# Patient Record
Sex: FEMALE | URBAN - METROPOLITAN AREA
[De-identification: names, ages, dates, MRNs, and addresses within clinical notes are randomized per-mention and may not be internally consistent; named-entity substitution may affect disease eponyms.]

---

## 2024-08-01 PROBLEM — Z00.00 ENCOUNTER FOR PREVENTIVE HEALTH EXAMINATION: Status: ACTIVE | Noted: 2024-08-01

## 2024-10-18 ENCOUNTER — INPATIENT (INPATIENT)
Facility: HOSPITAL | Age: 54
LOS: 10 days | Discharge: HOME | DRG: 871 | End: 2024-10-29
Attending: HOSPITALIST | Admitting: STUDENT IN AN ORGANIZED HEALTH CARE EDUCATION/TRAINING PROGRAM
Payer: MEDICARE

## 2024-10-18 ENCOUNTER — RESULT REVIEW (OUTPATIENT)
Age: 54
End: 2024-10-18

## 2024-10-18 VITALS
OXYGEN SATURATION: 100 % | TEMPERATURE: 99 F | RESPIRATION RATE: 26 BRPM | SYSTOLIC BLOOD PRESSURE: 72 MMHG | DIASTOLIC BLOOD PRESSURE: 56 MMHG | HEART RATE: 115 BPM

## 2024-10-18 DIAGNOSIS — I69.391 DYSPHAGIA FOLLOWING CEREBRAL INFARCTION: ICD-10-CM

## 2024-10-18 DIAGNOSIS — A41.59 OTHER GRAM-NEGATIVE SEPSIS: ICD-10-CM

## 2024-10-18 DIAGNOSIS — G92.8 OTHER TOXIC ENCEPHALOPATHY: ICD-10-CM

## 2024-10-18 DIAGNOSIS — E78.00 PURE HYPERCHOLESTEROLEMIA, UNSPECIFIED: ICD-10-CM

## 2024-10-18 DIAGNOSIS — D68.61 ANTIPHOSPHOLIPID SYNDROME: ICD-10-CM

## 2024-10-18 DIAGNOSIS — G40.909 EPILEPSY, UNSPECIFIED, NOT INTRACTABLE, WITHOUT STATUS EPILEPTICUS: ICD-10-CM

## 2024-10-18 DIAGNOSIS — E83.52 HYPERCALCEMIA: ICD-10-CM
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
## 2024-10-18 DIAGNOSIS — Z79.01 LONG TERM (CURRENT) USE OF ANTICOAGULANTS: ICD-10-CM

## 2024-10-18 DIAGNOSIS — R65.21 SEVERE SEPSIS WITH SEPTIC SHOCK: ICD-10-CM

## 2024-10-18 DIAGNOSIS — N18.6 END STAGE RENAL DISEASE: ICD-10-CM

## 2024-10-18 DIAGNOSIS — D62 ACUTE POSTHEMORRHAGIC ANEMIA: ICD-10-CM
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
## 2024-10-18 DIAGNOSIS — J96.01 ACUTE RESPIRATORY FAILURE WITH HYPOXIA: ICD-10-CM

## 2024-10-18 DIAGNOSIS — T82.856A STENOSIS OF PERIPHERAL VASCULAR STENT, INITIAL ENCOUNTER: ICD-10-CM

## 2024-10-18 DIAGNOSIS — Z99.2 DEPENDENCE ON RENAL DIALYSIS: ICD-10-CM

## 2024-10-18 DIAGNOSIS — I69.320 APHASIA FOLLOWING CEREBRAL INFARCTION: ICD-10-CM

## 2024-10-18 DIAGNOSIS — L89.150 PRESSURE ULCER OF SACRAL REGION, UNSTAGEABLE: ICD-10-CM

## 2024-10-18 DIAGNOSIS — K92.2 GASTROINTESTINAL HEMORRHAGE, UNSPECIFIED: ICD-10-CM

## 2024-10-18 DIAGNOSIS — D53.9 NUTRITIONAL ANEMIA, UNSPECIFIED: ICD-10-CM

## 2024-10-18 DIAGNOSIS — G90.9 DISORDER OF THE AUTONOMIC NERVOUS SYSTEM, UNSPECIFIED: ICD-10-CM

## 2024-10-18 DIAGNOSIS — R09.89 OTHER SPECIFIED SYMPTOMS AND SIGNS INVOLVING THE CIRCULATORY AND RESPIRATORY SYSTEMS: ICD-10-CM

## 2024-10-18 DIAGNOSIS — I95.9 HYPOTENSION, UNSPECIFIED: ICD-10-CM

## 2024-10-18 DIAGNOSIS — R79.89 OTHER SPECIFIED ABNORMAL FINDINGS OF BLOOD CHEMISTRY: ICD-10-CM

## 2024-10-18 DIAGNOSIS — R57.1 HYPOVOLEMIC SHOCK: ICD-10-CM

## 2024-10-18 LAB
ALBUMIN SERPL ELPH-MCNC: 2.5 G/DL — LOW (ref 3.3–5)
ALP SERPL-CCNC: 118 U/L — SIGNIFICANT CHANGE UP (ref 40–120)
ALT FLD-CCNC: 42 U/L — SIGNIFICANT CHANGE UP (ref 12–78)
ANION GAP SERPL CALC-SCNC: 8 MMOL/L — SIGNIFICANT CHANGE UP (ref 5–17)
ANISOCYTOSIS BLD QL: SLIGHT — SIGNIFICANT CHANGE UP
APPEARANCE UR: CLEAR — SIGNIFICANT CHANGE UP
APTT BLD: 108.2 SEC — HIGH (ref 24.5–35.6)
APTT BLD: 36.2 SEC — HIGH (ref 24.5–35.6)
AST SERPL-CCNC: 57 U/L — HIGH (ref 15–37)
BACTERIA # UR AUTO: NEGATIVE /HPF — SIGNIFICANT CHANGE UP
BASE EXCESS BLDA CALC-SCNC: 5.1 MMOL/L — HIGH (ref -2–3)
BASOPHILS # BLD AUTO: 0.04 K/UL — SIGNIFICANT CHANGE UP (ref 0–0.2)
BASOPHILS NFR BLD AUTO: 0.4 % — SIGNIFICANT CHANGE UP (ref 0–2)
BILIRUB SERPL-MCNC: 0.6 MG/DL — SIGNIFICANT CHANGE UP (ref 0.2–1.2)
BILIRUB UR-MCNC: NEGATIVE — SIGNIFICANT CHANGE UP
BLOOD GAS COMMENTS ARTERIAL: SIGNIFICANT CHANGE UP
BUN SERPL-MCNC: 43 MG/DL — HIGH (ref 7–23)
CALCIUM SERPL-MCNC: 11.8 MG/DL — HIGH (ref 8.5–10.1)
CAST: 12 /LPF — HIGH (ref 0–4)
CHLORIDE SERPL-SCNC: 97 MMOL/L — SIGNIFICANT CHANGE UP (ref 96–108)
CO2 SERPL-SCNC: 30 MMOL/L — SIGNIFICANT CHANGE UP (ref 22–31)
COLOR SPEC: YELLOW — SIGNIFICANT CHANGE UP
CREAT SERPL-MCNC: 5.07 MG/DL — HIGH (ref 0.5–1.3)
DIFF PNL FLD: NEGATIVE — SIGNIFICANT CHANGE UP
EGFR: 9 ML/MIN/1.73M2 — LOW
EOSINOPHIL # BLD AUTO: 0 K/UL — SIGNIFICANT CHANGE UP (ref 0–0.5)
EOSINOPHIL NFR BLD AUTO: 0 % — SIGNIFICANT CHANGE UP (ref 0–6)
FLUAV AG NPH QL: SIGNIFICANT CHANGE UP
FLUBV AG NPH QL: SIGNIFICANT CHANGE UP
GLUCOSE SERPL-MCNC: 133 MG/DL — HIGH (ref 70–99)
GLUCOSE UR QL: NEGATIVE MG/DL — SIGNIFICANT CHANGE UP
HCO3 BLDA-SCNC: 30 MMOL/L — HIGH (ref 21–28)
HCT VFR BLD CALC: 27.9 % — LOW (ref 34.5–45)
HCT VFR BLD CALC: 31.3 % — LOW (ref 34.5–45)
HGB BLD-MCNC: 8.5 G/DL — LOW (ref 11.5–15.5)
HGB BLD-MCNC: 9.6 G/DL — LOW (ref 11.5–15.5)
IMM GRANULOCYTES NFR BLD AUTO: 0.5 % — SIGNIFICANT CHANGE UP (ref 0–0.9)
INR BLD: 1.43 RATIO — HIGH (ref 0.85–1.16)
KETONES UR-MCNC: NEGATIVE MG/DL — SIGNIFICANT CHANGE UP
LACTATE SERPL-SCNC: 1.6 MMOL/L — SIGNIFICANT CHANGE UP (ref 0.7–2)
LEUKOCYTE ESTERASE UR-ACNC: ABNORMAL
LYMPHOCYTES # BLD AUTO: 1.62 K/UL — SIGNIFICANT CHANGE UP (ref 1–3.3)
LYMPHOCYTES # BLD AUTO: 14.7 % — SIGNIFICANT CHANGE UP (ref 13–44)
MACROCYTES BLD QL: SLIGHT — SIGNIFICANT CHANGE UP
MANUAL SMEAR VERIFICATION: SIGNIFICANT CHANGE UP
MCHC RBC-ENTMCNC: 30.5 GM/DL — LOW (ref 32–36)
MCHC RBC-ENTMCNC: 30.7 GM/DL — LOW (ref 32–36)
MCHC RBC-ENTMCNC: 30.9 PG — SIGNIFICANT CHANGE UP (ref 27–34)
MCHC RBC-ENTMCNC: 31.1 PG — SIGNIFICANT CHANGE UP (ref 27–34)
MCV RBC AUTO: 100.6 FL — HIGH (ref 80–100)
MCV RBC AUTO: 102.2 FL — HIGH (ref 80–100)
MONOCYTES # BLD AUTO: 0.81 K/UL — SIGNIFICANT CHANGE UP (ref 0–0.9)
MONOCYTES NFR BLD AUTO: 7.3 % — SIGNIFICANT CHANGE UP (ref 2–14)
NEUTROPHILS # BLD AUTO: 8.51 K/UL — HIGH (ref 1.8–7.4)
NEUTROPHILS NFR BLD AUTO: 77.1 % — HIGH (ref 43–77)
NITRITE UR-MCNC: NEGATIVE — SIGNIFICANT CHANGE UP
PCO2 BLDA: 47 MMHG — HIGH (ref 32–45)
PH BLDA: 7.42 — SIGNIFICANT CHANGE UP (ref 7.35–7.45)
PH UR: >=9 (ref 5–8)
PLAT MORPH BLD: NORMAL — SIGNIFICANT CHANGE UP
PLATELET # BLD AUTO: 125 K/UL — LOW (ref 150–400)
PLATELET # BLD AUTO: 153 K/UL — SIGNIFICANT CHANGE UP (ref 150–400)
PO2 BLDA: 429 MMHG — HIGH (ref 83–108)
POTASSIUM SERPL-MCNC: 4.2 MMOL/L — SIGNIFICANT CHANGE UP (ref 3.5–5.3)
POTASSIUM SERPL-SCNC: 4.2 MMOL/L — SIGNIFICANT CHANGE UP (ref 3.5–5.3)
PROT SERPL-MCNC: 9 GM/DL — HIGH (ref 6–8.3)
PROT UR-MCNC: 300 MG/DL
PROTHROM AB SERPL-ACNC: 16.4 SEC — HIGH (ref 9.9–13.4)
RBC # BLD: 2.73 M/UL — LOW (ref 3.8–5.2)
RBC # BLD: 3.11 M/UL — LOW (ref 3.8–5.2)
RBC # FLD: 19.9 % — HIGH (ref 10.3–14.5)
RBC # FLD: 20.3 % — HIGH (ref 10.3–14.5)
RBC BLD AUTO: ABNORMAL
RBC CASTS # UR COMP ASSIST: 6 /HPF — HIGH (ref 0–4)
RSV RNA NPH QL NAA+NON-PROBE: SIGNIFICANT CHANGE UP
SAO2 % BLDA: 100 % — HIGH (ref 94–98)
SARS-COV-2 RNA SPEC QL NAA+PROBE: SIGNIFICANT CHANGE UP
SODIUM SERPL-SCNC: 135 MMOL/L — SIGNIFICANT CHANGE UP (ref 135–145)
SP GR SPEC: 1.01 — SIGNIFICANT CHANGE UP (ref 1–1.03)
SQUAMOUS # UR AUTO: 2 /HPF — SIGNIFICANT CHANGE UP (ref 0–5)
TROPONIN I, HIGH SENSITIVITY RESULT: 85.68 NG/L — HIGH
UROBILINOGEN FLD QL: 1 MG/DL — SIGNIFICANT CHANGE UP (ref 0.2–1)
WBC # BLD: 11.03 K/UL — HIGH (ref 3.8–10.5)
WBC # BLD: 9.57 K/UL — SIGNIFICANT CHANGE UP (ref 3.8–10.5)
WBC # FLD AUTO: 11.03 K/UL — HIGH (ref 3.8–10.5)
WBC # FLD AUTO: 9.57 K/UL — SIGNIFICANT CHANGE UP (ref 3.8–10.5)
WBC UR QL: 8 /HPF — HIGH (ref 0–5)

## 2024-10-18 PROCEDURE — 82962 GLUCOSE BLOOD TEST: CPT

## 2024-10-18 PROCEDURE — 87077 CULTURE AEROBIC IDENTIFY: CPT

## 2024-10-18 PROCEDURE — 85025 COMPLETE CBC W/AUTO DIFF WBC: CPT

## 2024-10-18 PROCEDURE — 83735 ASSAY OF MAGNESIUM: CPT

## 2024-10-18 PROCEDURE — C1751: CPT

## 2024-10-18 PROCEDURE — 74177 CT ABD & PELVIS W/CONTRAST: CPT | Mod: 26

## 2024-10-18 PROCEDURE — 86901 BLOOD TYPING SEROLOGIC RH(D): CPT

## 2024-10-18 PROCEDURE — 84484 ASSAY OF TROPONIN QUANT: CPT

## 2024-10-18 PROCEDURE — 93010 ELECTROCARDIOGRAM REPORT: CPT

## 2024-10-18 PROCEDURE — 84443 ASSAY THYROID STIM HORMONE: CPT

## 2024-10-18 PROCEDURE — 82652 VIT D 1 25-DIHYDROXY: CPT

## 2024-10-18 PROCEDURE — 87641 MR-STAPH DNA AMP PROBE: CPT

## 2024-10-18 PROCEDURE — P9047: CPT

## 2024-10-18 PROCEDURE — 82040 ASSAY OF SERUM ALBUMIN: CPT

## 2024-10-18 PROCEDURE — 36600 WITHDRAWAL OF ARTERIAL BLOOD: CPT

## 2024-10-18 PROCEDURE — 99291 CRITICAL CARE FIRST HOUR: CPT

## 2024-10-18 PROCEDURE — 87070 CULTURE OTHR SPECIMN AEROBIC: CPT

## 2024-10-18 PROCEDURE — 90935 HEMODIALYSIS ONE EVALUATION: CPT

## 2024-10-18 PROCEDURE — 71045 X-RAY EXAM CHEST 1 VIEW: CPT | Mod: 26

## 2024-10-18 PROCEDURE — P9040: CPT

## 2024-10-18 PROCEDURE — 93306 TTE W/DOPPLER COMPLETE: CPT | Mod: 26

## 2024-10-18 PROCEDURE — 82330 ASSAY OF CALCIUM: CPT

## 2024-10-18 PROCEDURE — 74177 CT ABD & PELVIS W/CONTRAST: CPT | Mod: MC

## 2024-10-18 PROCEDURE — 82272 OCCULT BLD FECES 1-3 TESTS: CPT

## 2024-10-18 PROCEDURE — 82310 ASSAY OF CALCIUM: CPT

## 2024-10-18 PROCEDURE — 85610 PROTHROMBIN TIME: CPT

## 2024-10-18 PROCEDURE — 85730 THROMBOPLASTIN TIME PARTIAL: CPT

## 2024-10-18 PROCEDURE — 71275 CT ANGIOGRAPHY CHEST: CPT | Mod: 26

## 2024-10-18 PROCEDURE — 71275 CT ANGIOGRAPHY CHEST: CPT | Mod: MC

## 2024-10-18 PROCEDURE — 87186 SC STD MICRODIL/AGAR DIL: CPT

## 2024-10-18 PROCEDURE — 80074 ACUTE HEPATITIS PANEL: CPT

## 2024-10-18 PROCEDURE — C9254: CPT

## 2024-10-18 PROCEDURE — 80053 COMPREHEN METABOLIC PANEL: CPT

## 2024-10-18 PROCEDURE — 82306 VITAMIN D 25 HYDROXY: CPT

## 2024-10-18 PROCEDURE — 36415 COLL VENOUS BLD VENIPUNCTURE: CPT

## 2024-10-18 PROCEDURE — 85027 COMPLETE CBC AUTOMATED: CPT

## 2024-10-18 PROCEDURE — 80202 ASSAY OF VANCOMYCIN: CPT

## 2024-10-18 PROCEDURE — 99223 1ST HOSP IP/OBS HIGH 75: CPT

## 2024-10-18 PROCEDURE — 84100 ASSAY OF PHOSPHORUS: CPT

## 2024-10-18 PROCEDURE — 82803 BLOOD GASES ANY COMBINATION: CPT

## 2024-10-18 PROCEDURE — 86920 COMPATIBILITY TEST SPIN: CPT

## 2024-10-18 PROCEDURE — 36430 TRANSFUSION BLD/BLD COMPNT: CPT

## 2024-10-18 PROCEDURE — 80048 BASIC METABOLIC PNL TOTAL CA: CPT

## 2024-10-18 PROCEDURE — 71250 CT THORAX DX C-: CPT | Mod: 26,59,MC

## 2024-10-18 PROCEDURE — 86900 BLOOD TYPING SEROLOGIC ABO: CPT

## 2024-10-18 PROCEDURE — 93931 UPPER EXTREMITY STUDY: CPT | Mod: 26,RT

## 2024-10-18 PROCEDURE — 70450 CT HEAD/BRAIN W/O DYE: CPT | Mod: 26,MC

## 2024-10-18 PROCEDURE — 83970 ASSAY OF PARATHORMONE: CPT

## 2024-10-18 PROCEDURE — 87040 BLOOD CULTURE FOR BACTERIA: CPT

## 2024-10-18 PROCEDURE — 76376 3D RENDER W/INTRP POSTPROCES: CPT | Mod: 26

## 2024-10-18 PROCEDURE — 92610 EVALUATE SWALLOWING FUNCTION: CPT | Mod: GN

## 2024-10-18 PROCEDURE — 87640 STAPH A DNA AMP PROBE: CPT

## 2024-10-18 PROCEDURE — 86850 RBC ANTIBODY SCREEN: CPT

## 2024-10-18 PROCEDURE — 93931 UPPER EXTREMITY STUDY: CPT | Mod: RT

## 2024-10-18 RX ORDER — ESCITALOPRAM 10 MG/1
1 TABLET, FILM COATED ORAL
Refills: 0 | DISCHARGE

## 2024-10-18 RX ORDER — ALPRAZOLAM 0.25 MG
1 TABLET ORAL
Refills: 0 | DISCHARGE

## 2024-10-18 RX ORDER — CEFEPIME 2 G/1
1000 INJECTION, POWDER, FOR SOLUTION INTRAVENOUS EVERY 24 HOURS
Refills: 0 | Status: DISCONTINUED | OUTPATIENT
Start: 2024-10-18 | End: 2024-10-18

## 2024-10-18 RX ORDER — TRAZODONE HYDROCHLORIDE 100 MG/1
1 TABLET ORAL
Refills: 0 | DISCHARGE

## 2024-10-18 RX ORDER — COLLAGENASE CLOSTRIDIUM HIST. 250 UNIT/G
1 OINTMENT (GRAM) TOPICAL
Refills: 0 | DISCHARGE

## 2024-10-18 RX ORDER — WARFARIN SODIUM 4 MG
1 TABLET ORAL
Refills: 0 | DISCHARGE

## 2024-10-18 RX ORDER — SODIUM CHLORIDE 9 MG/ML
250 INJECTION, SOLUTION INTRAMUSCULAR; INTRAVENOUS; SUBCUTANEOUS ONCE
Refills: 0 | Status: COMPLETED | OUTPATIENT
Start: 2024-10-18 | End: 2024-10-18

## 2024-10-18 RX ORDER — LACOSAMIDE 100 MG/1
1 TABLET ORAL
Refills: 0 | DISCHARGE

## 2024-10-18 RX ORDER — SODIUM CHLORIDE 9 MG/ML
500 INJECTION, SOLUTION INTRAMUSCULAR; INTRAVENOUS; SUBCUTANEOUS ONCE
Refills: 0 | Status: COMPLETED | OUTPATIENT
Start: 2024-10-18 | End: 2024-10-18

## 2024-10-18 RX ORDER — SODIUM CHLORIDE 9 MG/ML
1000 INJECTION, SOLUTION INTRAMUSCULAR; INTRAVENOUS; SUBCUTANEOUS ONCE
Refills: 0 | Status: COMPLETED | OUTPATIENT
Start: 2024-10-18 | End: 2024-10-18

## 2024-10-18 RX ORDER — ACETAMINOPHEN 500 MG
2 TABLET ORAL
Refills: 0 | DISCHARGE

## 2024-10-18 RX ORDER — MICONAZOLE NITRATE 20 MG/G
1 CREAM TOPICAL
Refills: 0 | DISCHARGE

## 2024-10-18 RX ORDER — CEFEPIME 2 G/1
1000 INJECTION, POWDER, FOR SOLUTION INTRAVENOUS ONCE
Refills: 0 | Status: COMPLETED | OUTPATIENT
Start: 2024-10-18 | End: 2024-10-18

## 2024-10-18 RX ORDER — HEPARIN SODIUM 10000 [USP'U]/ML
5500 INJECTION INTRAVENOUS; SUBCUTANEOUS ONCE
Refills: 0 | Status: COMPLETED | OUTPATIENT
Start: 2024-10-18 | End: 2024-10-18

## 2024-10-18 RX ORDER — HEPARIN SODIUM 10000 [USP'U]/ML
2500 INJECTION INTRAVENOUS; SUBCUTANEOUS EVERY 6 HOURS
Refills: 0 | Status: DISCONTINUED | OUTPATIENT
Start: 2024-10-18 | End: 2024-10-20

## 2024-10-18 RX ORDER — POLYETHYLENE GLYCOL 3350 17 G/17G
17 POWDER, FOR SOLUTION ORAL
Refills: 0 | DISCHARGE

## 2024-10-18 RX ORDER — CEFEPIME 2 G/1
1000 INJECTION, POWDER, FOR SOLUTION INTRAVENOUS ONCE
Refills: 0 | Status: DISCONTINUED | OUTPATIENT
Start: 2024-10-18 | End: 2024-10-18

## 2024-10-18 RX ORDER — CHLORHEXIDINE GLUCONATE 40 MG/ML
1 SOLUTION TOPICAL
Refills: 0 | Status: DISCONTINUED | OUTPATIENT
Start: 2024-10-18 | End: 2024-10-21

## 2024-10-18 RX ORDER — VANCOMYCIN HYDROCHLORIDE 50 MG/ML
1000 KIT ORAL ONCE
Refills: 0 | Status: COMPLETED | OUTPATIENT
Start: 2024-10-18 | End: 2024-10-18

## 2024-10-18 RX ORDER — HYDROXYCHLOROQUINE SULFATE 200 MG
2 TABLET ORAL
Refills: 0 | DISCHARGE

## 2024-10-18 RX ORDER — SENNA 187 MG
1 TABLET ORAL
Refills: 0 | DISCHARGE

## 2024-10-18 RX ORDER — METOPROLOL TARTRATE 50 MG
1 TABLET ORAL
Refills: 0 | DISCHARGE

## 2024-10-18 RX ORDER — NOREPINEPHRINE BITARTRATE 1 MG/ML
0.05 INJECTION, SOLUTION, CONCENTRATE INTRAVENOUS
Qty: 8 | Refills: 0 | Status: DISCONTINUED | OUTPATIENT
Start: 2024-10-18 | End: 2024-10-21

## 2024-10-18 RX ORDER — CEFEPIME 2 G/1
1000 INJECTION, POWDER, FOR SOLUTION INTRAVENOUS EVERY 24 HOURS
Refills: 0 | Status: DISCONTINUED | OUTPATIENT
Start: 2024-10-18 | End: 2024-10-21

## 2024-10-18 RX ORDER — PANTOPRAZOLE SODIUM 40 MG/1
40 TABLET, DELAYED RELEASE ORAL DAILY
Refills: 0 | Status: DISCONTINUED | OUTPATIENT
Start: 2024-10-18 | End: 2024-10-20

## 2024-10-18 RX ORDER — TRAMADOL HYDROCHLORIDE 50 MG/1
1 TABLET, COATED ORAL
Refills: 0 | DISCHARGE

## 2024-10-18 RX ORDER — BUSPIRONE HYDROCHLORIDE 15 MG/1
1 TABLET ORAL
Refills: 0 | DISCHARGE

## 2024-10-18 RX ORDER — HEPARIN SODIUM 10000 [USP'U]/ML
5500 INJECTION INTRAVENOUS; SUBCUTANEOUS EVERY 6 HOURS
Refills: 0 | Status: DISCONTINUED | OUTPATIENT
Start: 2024-10-18 | End: 2024-10-20

## 2024-10-18 RX ORDER — LACOSAMIDE 100 MG/1
150 TABLET ORAL EVERY 12 HOURS
Refills: 0 | Status: DISCONTINUED | OUTPATIENT
Start: 2024-10-18 | End: 2024-10-20

## 2024-10-18 RX ORDER — HEPARIN SODIUM 10000 [USP'U]/ML
INJECTION INTRAVENOUS; SUBCUTANEOUS
Qty: 25000 | Refills: 0 | Status: DISCONTINUED | OUTPATIENT
Start: 2024-10-18 | End: 2024-10-20

## 2024-10-18 RX ADMIN — HEPARIN SODIUM 1200 UNIT(S)/HR: 10000 INJECTION INTRAVENOUS; SUBCUTANEOUS at 16:04

## 2024-10-18 RX ADMIN — CEFEPIME 1000 MILLIGRAM(S): 2 INJECTION, POWDER, FOR SOLUTION INTRAVENOUS at 09:03

## 2024-10-18 RX ADMIN — VANCOMYCIN HYDROCHLORIDE 250 MILLIGRAM(S): KIT at 10:25

## 2024-10-18 RX ADMIN — NOREPINEPHRINE BITARTRATE 6.33 MICROGRAM(S)/KG/MIN: 1 INJECTION, SOLUTION, CONCENTRATE INTRAVENOUS at 11:02

## 2024-10-18 RX ADMIN — LACOSAMIDE 100 MILLIGRAM(S): 100 TABLET ORAL at 16:47

## 2024-10-18 RX ADMIN — CHLORHEXIDINE GLUCONATE 1 APPLICATION(S): 40 SOLUTION TOPICAL at 23:39

## 2024-10-18 RX ADMIN — SODIUM CHLORIDE 250 MILLILITER(S): 9 INJECTION, SOLUTION INTRAMUSCULAR; INTRAVENOUS; SUBCUTANEOUS at 09:05

## 2024-10-18 RX ADMIN — HEPARIN SODIUM 1100 UNIT(S)/HR: 10000 INJECTION INTRAVENOUS; SUBCUTANEOUS at 23:38

## 2024-10-18 RX ADMIN — PANTOPRAZOLE SODIUM 40 MILLIGRAM(S): 40 TABLET, DELAYED RELEASE ORAL at 23:39

## 2024-10-18 RX ADMIN — HEPARIN SODIUM 5500 UNIT(S): 10000 INJECTION INTRAVENOUS; SUBCUTANEOUS at 16:06

## 2024-10-18 RX ADMIN — SODIUM CHLORIDE 500 MILLILITER(S): 9 INJECTION, SOLUTION INTRAMUSCULAR; INTRAVENOUS; SUBCUTANEOUS at 10:10

## 2024-10-18 RX ADMIN — SODIUM CHLORIDE 500 MILLILITER(S): 9 INJECTION, SOLUTION INTRAMUSCULAR; INTRAVENOUS; SUBCUTANEOUS at 09:05

## 2024-10-18 RX ADMIN — HEPARIN SODIUM 1200 UNIT(S)/HR: 10000 INJECTION INTRAVENOUS; SUBCUTANEOUS at 19:45

## 2024-10-18 RX ADMIN — SODIUM CHLORIDE 1000 MILLILITER(S): 9 INJECTION, SOLUTION INTRAMUSCULAR; INTRAVENOUS; SUBCUTANEOUS at 10:50

## 2024-10-18 NOTE — ED PROVIDER NOTE - PROGRESS NOTE DETAILS
ED Attending Dr. Waller, Pt difficult IV access.  Left mid line not working well.  Plan IV team to help with access.

## 2024-10-18 NOTE — ED PROVIDER NOTE - WET READ LAUNCH FT
0 = swallows foods/liquids without difficulty There are no Wet Read(s) to document. There is 1 Wet Read(s) to document.

## 2024-10-18 NOTE — H&P ADULT - NSHPLABSRESULTS_GEN_ALL_CORE
LABS:    CBC Full  -  ( 18 Oct 2024 07:32 )  WBC Count : 11.03 K/uL  RBC Count : 3.11 M/uL  Hemoglobin : 9.6 g/dL  Hematocrit : 31.3 %  Platelet Count - Automated : 153 K/uL  Mean Cell Volume : 100.6 fl  Mean Cell Hemoglobin : 30.9 pg  Mean Cell Hemoglobin Concentration : 30.7 gm/dL  Auto Neutrophil # : 8.51 K/uL  Auto Lymphocyte # : 1.62 K/uL  Auto Monocyte # : 0.81 K/uL  Auto Eosinophil # : 0.00 K/uL  Auto Basophil # : 0.04 K/uL  Auto Neutrophil % : 77.1 %  Auto Lymphocyte % : 14.7 %  Auto Monocyte % : 7.3 %  Auto Eosinophil % : 0.0 %  Auto Basophil % : 0.4 %    10-18    135  |  97  |  43[H]  ----------------------------<  133[H]  4.2   |  30  |  5.07[H]    Ca    11.8[H]      18 Oct 2024 07:32    TPro  9.0[H]  /  Alb  2.5[L]  /  TBili  0.6  /  DBili  x   /  AST  57[H]  /  ALT  42  /  AlkPhos  118  10-18      Urinalysis Basic - ( 18 Oct 2024 07:32 )    Color: x / Appearance: x / SG: x / pH: x  Gluc: 133 mg/dL / Ketone: x  / Bili: x / Urobili: x   Blood: x / Protein: x / Nitrite: x   Leuk Esterase: x / RBC: x / WBC x   Sq Epi: x / Non Sq Epi: x / Bacteria: x      CAPILLARY BLOOD GLUCOSE      POCT Blood Glucose.: 117 mg/dL (18 Oct 2024 07:24)        LIVER FUNCTIONS - ( 18 Oct 2024 07:32 )  Alb: 2.5 g/dL / Pro: 9.0 gm/dL / ALK PHOS: 118 U/L / ALT: 42 U/L / AST: 57 U/L / GGT: x

## 2024-10-18 NOTE — PATIENT PROFILE ADULT - FALL HARM RISK - HARM RISK INTERVENTIONS

## 2024-10-18 NOTE — ED ADULT NURSE NOTE - NSFALLHARMRISKINTERV_ED_ALL_ED
Assistance OOB with selected safe patient handling equipment if applicable/Assistance with ambulation/Communicate risk of Fall with Harm to all staff, patient, and family/Monitor gait and stability/Monitor for mental status changes and reorient to person, place, and time, as needed/Provide visual cue: red socks, yellow wristband, yellow gown, etc/Reinforce activity limits and safety measures with patient and family/Toileting schedule using arm’s reach rule for commode and bathroom/Use of alarms - bed, stretcher, chair and/or video monitoring/Bed in lowest position, wheels locked, appropriate side rails in place/Call bell, personal items and telephone in reach/Instruct patient to call for assistance before getting out of bed/chair/stretcher/Non-slip footwear applied when patient is off stretcher/[place] to call system/Physically safe environment - no spills, clutter or unnecessary equipment/Purposeful Proactive Rounding/Room/bathroom lighting operational, light cord in reach

## 2024-10-18 NOTE — ED PROVIDER NOTE - OBJECTIVE STATEMENT
54-year-old patient with past medical history for antiphospholipid syndrome, end-stage renal disease on dialysis Monday Wednesday Friday, acute embolism and thrombosis of the right internal jugular vein, high blood pressure, stroke, endocarditis, high cholesterol, presents emergency department for hypoxia, hypotension, less responsiveness.  Patient is awake,  soft  smoking and  answers few questions.  Patient currently with no pain  .  [facility] called and informed that patient was found this morning satting 60%, blood pressure 87/55 and low temperature of 97.2.  Patient was sent to the ER for further evaluation.  Patient is a DNR, but   Not DNI

## 2024-10-18 NOTE — PHARMACOTHERAPY INTERVENTION NOTE - COMMENTS
Med history complete, reviewed medications and allergies with patients med list from [facility] and confirmed medication list with doctor first med profile

## 2024-10-18 NOTE — ED ADULT NURSE NOTE - BREATHING INTERVENTIONS
non-rebreather/Oxygen Cosentyx Counseling:  I discussed with the patient the risks of Cosentyx including but not limited to worsening of Crohn's disease, immunosuppression, allergic reactions and infections.  The patient understands that monitoring is required including a PPD at baseline and must alert us or the primary physician if symptoms of infection or other concerning signs are noted.

## 2024-10-18 NOTE — ED ADULT TRIAGE NOTE - CHIEF COMPLAINT QUOTE
Pt BIBEMS from [facility] c/o hypoxia, hypotension, AMS and increasing lethargy beginning yesterday. PMhx CVA and ERSD. Upon arrival to ED,  pt taken directly to trauma room, MD Barryu at bedside. Pt BIBEMS from [place] c/o hypoxia, hypotension, AMS and increasing lethargy beginning yesterday. PICC line and fistula present upon arrival.  PMhx CVA and ERSD. Upon arrival to ED,  pt taken directly to trauma room, MD Barryu at bedside.

## 2024-10-18 NOTE — ED PROVIDER NOTE - PHYSICAL EXAMINATION
Returned call to pt, appt scheduled for 9/30/24 with a wait list option .   Constitutional: NAD, answer few questions  Eyes: EOMI, pupils equal  Head: Normocephalic atraumatic, dry MM  Mouth: no airway obstruction  Cardiac: regular rate , av fistula right arm  Resp: crackles lung base bilateral  GI: Abd s/nt/nd  Skin: + decub ulcer

## 2024-10-18 NOTE — PROGRESS NOTE ADULT - SUBJECTIVE AND OBJECTIVE BOX
OVERNIGHT EVENTS / SUBJECTIVE: Patient seen and examined at bedside.     Briefly, this is a 55 y/o F with PMH antiphospholipid syndrome on coumadin, ?lupus (on plaquenil), Sz d/o? 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 Pt found to be hypotensive in the ED despite 2.5L IVF thus started on pressors and admitted to CCU for further evaluation and management.    On arrival to CCU pt lethargic, protecting her airway, difficult to obtain pulse ox reading but ABG showing PO2 400 on 100$ NRB thus deescalated to NC. Pt is able to say her name but not responding further. On levo 0.09.    Labwork significant for mild leukocytosis, hgb 9.6, INR 1.43, BUN/Cr 43/5.07. UA w/ trace leuk esterase, few WBC.    CTH w/ chronic infarcts, encephalomalacia/gliosis, no acute findings.    CT C/A/P w/ no PE, possible R subclavian clot, enlarged fibroid uterus.      OBJECTIVE:    VITAL SIGNS:  ICU Vital Signs Last 24 Hrs  T(C): 36.2 (18 Oct 2024 12:39), Max: 37.4 (18 Oct 2024 07:22)  T(F): 97.2 (18 Oct 2024 12:39), Max: 99.3 (18 Oct 2024 07:22)  HR: 98 (18 Oct 2024 16:00) (92 - 115)  BP: 86/67 (18 Oct 2024 16:00) (35/26 - 112/31)  BP(mean): 75 (18 Oct 2024 16:00) (29 - 83)  ABP: --  ABP(mean): --  RR: 21 (18 Oct 2024 16:00) (10 - 27)  SpO2: 100% (18 Oct 2024 12:13) (80% - 100%)    O2 Parameters below as of 18 Oct 2024 13:00  Patient On (Oxygen Delivery Method): mask, nonrebreather              CAPILLARY BLOOD GLUCOSE      POCT Blood Glucose.: 117 mg/dL (18 Oct 2024 07:24)      PHYSICAL EXAM:    General: Lethargic, in NAD  HEENT: NC/AT; clear conjunctiva  Neck: supple  Respiratory: CTA b/l  Cardiovascular: +S1/S2; RRR  Abdomen: soft, nondistended, nontender  Extremities: Warm, no LE edema  Skin: No rash  Neurological: Lethargic, says name, not following commands or answering other questions, responds to pain    MEDICATIONS:  MEDICATIONS  (STANDING):  cefepime  Injectable. 1000 milliGRAM(s) IV Push every 24 hours  chlorhexidine 2% Cloths 1 Application(s) Topical <User Schedule>  heparin  Infusion.  Unit(s)/Hr (12 mL/Hr) IV Continuous <Continuous>  lacosamide IVPB 150 milliGRAM(s) IV Intermittent every 12 hours  norepinephrine Infusion 0.05 MICROgram(s)/kG/Min (6.33 mL/Hr) IV Continuous <Continuous>  pantoprazole  Injectable 40 milliGRAM(s) IV Push daily    MEDICATIONS  (PRN):  heparin   Injectable 5500 Unit(s) IV Push every 6 hours PRN For aPTT less than 40  heparin   Injectable 2500 Unit(s) IV Push every 6 hours PRN For aPTT between 40 - 57      ALLERGIES:  Allergies    No Known Allergies    Intolerances        LABS:                        9.6    11.03 )-----------( 153      ( 18 Oct 2024 07:32 )             31.3     Hemoglobin: 9.6 g/dL (10-18 @ 07:32)    CBC Full  -  ( 18 Oct 2024 07:32 )  WBC Count : 11.03 K/uL  RBC Count : 3.11 M/uL  Hemoglobin : 9.6 g/dL  Hematocrit : 31.3 %  Platelet Count - Automated : 153 K/uL  Mean Cell Volume : 100.6 fl  Mean Cell Hemoglobin : 30.9 pg  Mean Cell Hemoglobin Concentration : 30.7 gm/dL  Auto Neutrophil # : 8.51 K/uL  Auto Lymphocyte # : 1.62 K/uL  Auto Monocyte # : 0.81 K/uL  Auto Eosinophil # : 0.00 K/uL  Auto Basophil # : 0.04 K/uL  Auto Neutrophil % : 77.1 %  Auto Lymphocyte % : 14.7 %  Auto Monocyte % : 7.3 %  Auto Eosinophil % : 0.0 %  Auto Basophil % : 0.4 %    10-18    135  |  97  |  43[H]  ----------------------------<  133[H]  4.2   |  30  |  5.07[H]    Ca    11.8[H]      18 Oct 2024 07:32    TPro  9.0[H]  /  Alb  2.5[L]  /  TBili  0.6  /  DBili  x   /  AST  57[H]  /  ALT  42  /  AlkPhos  118  10-18    Creatinine Trend: 5.07<--  LIVER FUNCTIONS - ( 18 Oct 2024 07:32 )  Alb: 2.5 g/dL / Pro: 9.0 gm/dL / ALK PHOS: 118 U/L / ALT: 42 U/L / AST: 57 U/L / GGT: x           PT/INR - ( 18 Oct 2024 13:04 )   PT: 16.4 sec;   INR: 1.43 ratio         PTT - ( 18 Oct 2024 13:04 )  PTT:36.2 sec    hs Troponin:    ABG - ( 18 Oct 2024 13:59 )  pH, Arterial: 7.42  pH, Blood: x     /  pCO2: 47    /  pO2: 429   / HCO3: 30    / Base Excess: 5.1   /  SaO2: 100                 13:59 - ABG - pH: 7.42  |  pCO2: 47    |  pO2: 429   | Lactate:       | BE: 5.1        Urinalysis Basic - ( 18 Oct 2024 07:32 )    Color: x / Appearance: x / SG: x / pH: x  Gluc: 133 mg/dL / Ketone: x  / Bili: x / Urobili: x   Blood: x / Protein: x / Nitrite: x   Leuk Esterase: x / RBC: x / WBC x   Sq Epi: x / Non Sq Epi: x / Bacteria: x      CSF:                      EKG:   MICROBIOLOGY:    Urinalysis with Rflx Culture (collected 18 Oct 2024 07:30)      IMAGING:      Labs, imaging, EKG personally reviewed    RADIOLOGY & ADDITIONAL TESTS: Reviewed.

## 2024-10-18 NOTE — H&P ADULT - HISTORY OF PRESENT ILLNESS
ICU admit note    S:    Pt seen and examined  54-year-old patient with past medical history for antiphospholipid syndrome, end-stage renal disease on dialysis Monday Wednesday Friday, acute embolism and thrombosis of the right internal jugular vein, high blood pressure, stroke, endocarditis, high cholesterol, presents emergency department for hypoxia, hypotension, less responsiveness.  Patient is awake,  soft  smoking and  answers few questions.  Patient currently with no pain  .  [facility] called and informed that patient was found this morning satting 60%, blood pressure 87/55 and low temperature of 97.2.  Patient was sent to the ER for further evaluation.  Patient is a DNR, but   Not DNI    ICU called for consult    10/18: Hypotensive despite 1L IVF; add'l ordered, ESRD noted. Pressors ordered. Workup in progress.

## 2024-10-18 NOTE — ED ADULT NURSE NOTE - NSFALLASSESSNEED_ED_ALL_ED

## 2024-10-18 NOTE — ED ADULT TRIAGE NOTE - AVIAN FLU SYMPTOMS
- replete prn with IV and oral potassium  - magnesium checked  - q8h BMP    
Combined metabolic acidosis with high anion gap (secondary to ethanol) with respiratory acidosis. VBG checked, with pH of 7.24 and pCO2 of 48.  Patient is not hypoxic, breathing comfortably, GCS 14.     - continue to monitor, expect this was secondary to sedation which is slowly resolving    
Hgb 9.6 on admission, no baseline labs    - ordered iron studies, folate, B12    
Patient uses an inhaler twice daily, but is unsure which inhaler    - will need to check which inhaler she uses in the morning (parents should be arriving in the morning)    
Unsure of how much she drank last night. Ethanol level 216 at 2am.     - IVF given  - supportive care  - prn nausea medication    
No

## 2024-10-18 NOTE — ED ADULT NURSE NOTE - CHIEF COMPLAINT QUOTE
Pt BIBEMS from [place] c/o hypoxia, hypotension, AMS and increasing lethargy beginning yesterday. PICC line and fistula present upon arrival.  PMhx CVA and ERSD. Upon arrival to ED,  pt taken directly to trauma room, MD Barryu at bedside.

## 2024-10-18 NOTE — ED ADULT NURSE NOTE - OBJECTIVE STATEMENT
Patient is a 54y old female, alert and oriented X1, presenting to the ER with c/o hypotension. As per the triage note, "Pt BIBEMS from [place] c/o hypoxia, hypotension, AMS and increasing lethargy beginning yesterday."  Patient has midline noted to the left upper arm. Dressing changed upon arrival. MD Claire advised that Midline can be used.   Patient has an AV Fistula noted to the right upper arm. Pink band placed on right wrist.   Patient changed, sacral wound noted L: 2 1/2 X W: 1 1/2 X D: 1. Wound currently draining sanguineous fluid. Dressing applied.  Suspected Deep Tissue injuries noted to bilateral heels, non blanchable. Dressings applied.    Wound noted to the left upper thigh measuring 1 1/2  X 1 1/2. Dressing applied.  EKG completed and cardiac monitor in place.   Patient straight Cath for urine.

## 2024-10-18 NOTE — CONSULT NOTE ADULT - SUBJECTIVE AND OBJECTIVE BOX
NEPHROLOGY INTERVAL HPI/OVERNIGHT EVENTS:  JAYDON [id number]  HPI:    S:  Pt seen and examined  54-year-old patient with past medical history for antiphospholipid syndrome, end-stage renal disease on dialysis Monday Wednesday Friday, acute embolism and thrombosis of the right internal jugular vein, high blood pressure, stroke, endocarditis, high cholesterol, presents emergency department for hypoxia, hypotension, less responsiveness.  Patient is awake,  soft  smoking and  answers few questions.  Patient currently with no pain  .  [facility] called and informed that patient was found this morning satting 60%, blood pressure 87/55 and low temperature of 97.2.  Patient was sent to the ER for further evaluation.  Patient is a DNR, but   Not DNI  ICU called for consult  10/18: Hypotensive despite 1L IVF; add'l ordered, ESRD noted. Pressors ordered. Workup in progress.  (18 Oct 2024 11:35)  Patient is a 54y old  Female who presents with a chief complaint of Shock (18 Oct 2024 11:35)  ==============================================================================  NEPHROLOGY CONSULT  hx from chart and reivew of transfer document   esrd at [facility] on daily hd m-f   on MRSA endocard tx on vanc 500 mg end date 10/18 via left PICC line   admitted for shock with hypoxia   s/p CTA and a/p await results  on levofed   abg pending, sa02 not recordable   lethargic   has hx of APL on coumadin, with SLE on plaquenil   AVF left arm with + thrill and bruit  CCM input noted, unable to identify HCP       PAST MEDICAL & SURGICAL HISTORY:  - esrd mtwtf [facility] home dialysis   - MRSA endocarditis on vanc until 10/18   - APL on coumadin   - CVA with dysphagia   ESRD on dialysis          MEDICATIONS  (STANDING):  cefepime  Injectable. 1000 milliGRAM(s) IV Push every 24 hours  chlorhexidine 2% Cloths 1 Application(s) Topical <User Schedule>  lacosamide IVPB 150 milliGRAM(s) IV Intermittent every 12 hours  norepinephrine Infusion 0.05 MICROgram(s)/kG/Min (6.33 mL/Hr) IV Continuous <Continuous>    MEDICATIONS  (PRN):      Allergies    No Known Allergies    Intolerances        I&O's Summary      Home Medications:  busPIRone 10 mg oral tablet: 1 tab(s) orally 2 times a day (18 Oct 2024 11:16)  lacosamide 150 mg oral tablet: 1 tab(s) orally 2 times a day (18 Oct 2024 11:16)  Lexapro 20 mg oral tablet: 1 tab(s) orally once a day (18 Oct 2024 11:16)  Lipitor 40 mg oral tablet: 1 tab(s) orally once a day (at bedtime) (18 Oct 2024 11:16)  metoprolol succinate 25 mg oral capsule, extended release: 1 cap(s) orally once a day (18 Oct 2024 11:16)  miconazole 2% topical ointment: Apply topically to affected area 2 times a day apply to groin, perinium, buttocks (18 Oct 2024 11:16)  NIFEdipine (Eqv-Adalat CC) 30 mg oral tablet, extended release: 1 tab(s) orally once a day (18 Oct 2024 11:16)  Plaquenil 200 mg oral tablet: 2 tab(s) orally once a day (18 Oct 2024 11:16)  Polyethylene Glycol 3350: 17 billion vector genomes orally once a day (18 Oct 2024 11:16)  Protonix 40 mg oral delayed release tablet: 1 tab(s) orally once a day (18 Oct 2024 11:16)  Santyl 250 units/g topical ointment: Apply topically to affected area once a day apply to sacrum (18 Oct 2024 11:16)  senna (sennosides) 8.6 mg oral tablet: 1 tab(s) orally once a day (18 Oct 2024 11:16)  traMADol 50 mg oral tablet: 1 tab(s) orally every 8 hours as needed for  severe pain (18 Oct 2024 11:16)  traZODone 100 mg oral tablet: 1 tab(s) orally once a day (at bedtime) (18 Oct 2024 11:16)  Tylenol Caplet 325 mg oral tablet: 2 tab(s) orally every 6 hours as needed for fever and moderate pain (18 Oct 2024 11:16)  warfarin 6 mg oral tablet: 1 tab(s) orally once a day (18 Oct 2024 11:16)  Xanax 0.5 mg oral tablet: 1 tab(s) orally once a day (18 Oct 2024 11:16)          Vital Signs Last 24 Hrs  T(C): 36.2 (18 Oct 2024 12:39), Max: 37.4 (18 Oct 2024 07:22)  T(F): 97.2 (18 Oct 2024 12:39), Max: 99.3 (18 Oct 2024 07:22)  HR: 92 (18 Oct 2024 13:00) (92 - 115)  BP: 105/49 (18 Oct 2024 13:00) (35/26 - 111/56)  BP(mean): 62 (18 Oct 2024 13:00) (29 - 83)  RR: 18 (18 Oct 2024 13:00) (10 - 27)  SpO2: 100% (18 Oct 2024 12:13) (80% - 100%)    Parameters below as of 18 Oct 2024 13:00  Patient On (Oxygen Delivery Method): mask, nonrebreather      Daily     Daily   I&O's Summary      PHYSICAL EXAM:  GEN: alert awake O X 3  HEENT: MMM  NECK supple no jvd  CV: RRR s1s2  LUNGS: b/l CTA  ABD: +bs soft,   EXT: no edema    LABS:                        9.6    11.03 )-----------( 153      ( 18 Oct 2024 07:32 )             31.3     10-18    135  |  97  |  43[H]  ----------------------------<  133[H]  4.2   |  30  |  5.07[H]    Ca    11.8[H]      18 Oct 2024 07:32      TPro  9.0[H]  /  Alb  2.5[L]  /  TBili  0.6  /  DBili  x   /  AST  57[H]  /  ALT  42  /  AlkPhos  118  10-18    PT/INR - ( 18 Oct 2024 13:04 )   PT: 16.4 sec;   INR: 1.43 ratio         PTT - ( 18 Oct 2024 13:04 )  PTT:36.2 sec  Urinalysis Basic - ( 18 Oct 2024 07:32 )    Color: x / Appearance: x / SG: x / pH: x  Gluc: 133 mg/dL / Ketone: x  / Bili: x / Urobili: x   Blood: x / Protein: x / Nitrite: x   Leuk Esterase: x / RBC: x / WBC x   Sq Epi: x / Non Sq Epi: x / Bacteria: x            Urinalysis with Rflx Culture (collected 10-18-24 @ 07:30)

## 2024-10-18 NOTE — ED ADULT NURSE NOTE - PAIN: PRESENCE, MLM
Nephrology discharge recommendations:  - To follow up on your kidney function please get blood work upon discharge on or around 9/12/23  - You will receive a copy of the order form for the blood work in the mail from the office.  - Nephrology office will contact you to set up a follow-up appointment in a few days/weeks. - In the interim if any issues from a kidney standpoint please contact the office at [phone]. non-verbal indicators absent (Rating = 0)

## 2024-10-18 NOTE — H&P ADULT - ASSESSMENT
Interval History: 72 year old woman with history of spinal osteomyelitis .  Cultures - Candida albicans-06/11  Gram stain -negative     06/16- she is weak, denies fever or chills  06/17 - she feels weak, reports pain over the left hip   Review of Systems   Constitutional: Positive for activity change and fatigue.   HENT: Negative.    Eyes: Negative for visual disturbance.   Respiratory: Negative for apnea, cough, choking, chest tightness, shortness of breath, wheezing and stridor.    Cardiovascular: Negative for chest pain, palpitations and leg swelling.   Gastrointestinal: Negative for abdominal pain, constipation, diarrhea, nausea and vomiting.   Endocrine: Negative.    Musculoskeletal: Positive for back pain. Negative for arthralgias, joint swelling, myalgias, neck pain and neck stiffness.   Skin: Negative.    Allergic/Immunologic: Negative.    Neurological: Positive for tremors and weakness. Negative for dizziness, seizures, syncope, facial asymmetry, speech difficulty, light-headedness, numbness and headaches.   Hematological: Negative.    Psychiatric/Behavioral: Negative for agitation, behavioral problems and confusion.     Objective:     Vital Signs (Most Recent):  Temp: 98.6 °F (37 °C) (06/18/19 0732)  Pulse: 84 (06/18/19 0732)  Resp: 16 (06/18/19 0732)  BP: (!) 117/56 (06/18/19 0732)  SpO2: 95 % (06/18/19 0732) Vital Signs (24h Range):  Temp:  [97.4 °F (36.3 °C)-98.6 °F (37 °C)] 98.6 °F (37 °C)  Pulse:  [] 84  Resp:  [16-19] 16  SpO2:  [92 %-96 %] 95 %  BP: (106-119)/(53-56) 117/56     Weight: 94.9 kg (209 lb 3.5 oz)  Body mass index is 33.77 kg/m².    Estimated Creatinine Clearance: 72.7 mL/min (based on SCr of 0.8 mg/dL).    Physical Exam   Constitutional: She is oriented to person, place, and time. She appears well-developed and well-nourished. She is cooperative. She is easily aroused. She appears ill. Nasal cannula in place.   HENT:   Head: Normocephalic and atraumatic.   Mouth/Throat: Oropharynx  is clear and moist.   Eyes: Pupils are equal, round, and reactive to light. Conjunctivae and EOM are normal.   Neck: Normal range of motion. Neck supple.   Cardiovascular: Normal rate, regular rhythm and intact distal pulses.   Murmur heard.   Systolic murmur is present with a grade of 3/6.  Pulmonary/Chest: Effort normal. No accessory muscle usage or stridor. No tachypnea. No respiratory distress. She has decreased breath sounds. She has no wheezes. She has no rhonchi. She has no rales. She exhibits no tenderness.   Abdominal: Soft. Bowel sounds are normal. She exhibits no distension. There is no tenderness. There is no rebound and no guarding.   Genitourinary:   Genitourinary Comments: Deferred   Musculoskeletal: She exhibits no edema, tenderness or deformity.   L/S wound vac in place   Neurological: She is alert, oriented to person, place, and time and easily aroused. She displays tremor. No sensory deficit. GCS eye subscore is 4. GCS verbal subscore is 5.   Skin: Skin is warm and dry. Capillary refill takes less than 2 seconds.   Psychiatric: She has a normal mood and affect. Her behavior is normal. Thought content normal.   Nursing note and vitals reviewed.      Significant Labs:   Blood Culture:   Recent Labs   Lab 05/28/19  1159 06/01/19  0003 06/01/19  0010 06/10/19  1440 06/10/19  1526   LABBLOO No growth after 5 days. No growth after 5 days. No growth after 5 days. No growth after 5 days. No growth after 5 days.     BMP:   Recent Labs   Lab 06/18/19  0421   *   *   K 4.4   CL 94*   CO2 25   BUN 10   CREATININE 0.8   CALCIUM 9.5       Significant Imaging: I have reviewed all pertinent imaging results/findings within the past 24 hours.    A:    54F    Here for:    1. Shock, unclear etiology  2. ESRD  3. APS on coumadin  4. AHRF    This patient requires critical care for support of one or more vital organ systems with a high probability of imminent or life threatening deterioration in his/her condition    P:    Shock, unclear etiology  Hypoxic, yet no infiltrates on CT chest  Urine without obv infx  APS, ? PE    Sepsis bundle  Levophed, titrate as needed to maintain SBP > 100  Coags  UFH for AC depending on INR given APS, VTE risk  Cont abx, renally dosed  TTE  Renal consult  CTA chest to eval for PE; ESRD, long term  Trend labs  Midline from outside, functioning; maintain for now, may require central venous access  Wound care consult  Per chart, Hx of ANGELINA      Dispo: Admit to critical care. Pressors, resuscitation, pending CT to evaluate for source or PE.    Discussed w/ Dr White    -    Pt has MOLST filled out, but much information missing; filled out as DNR but not DNI; also accompanied by OOH DNR sheet; I am concerned about validity of this. I spoke with  from [place] (Fany Johnson [phone]). She gave me number for her HCP, a friend Toribio Villalobos ; I called, rang 3 times and dropped; no VM, noone picked up. Fany does not have paperwork showing Toribio is HCP. Sonja has no other family. Fany gave me number for SW supervisor at [facility] office , cell phone . Only other number in the chart is  this is number for a [facility] in the [place]    Given all of above, will maintain a full code for now; will obtain SW and palliative assistance with this difficult social situation    Date of entry of this note is equal to the date of services rendered    TOTAL CRITICAL CARE TIME: 112 minutes  (EXCLUSIVE of any non bundled procedures)    Note: This is a critically ill patient. Time spent has been in salvage of life, limb and vital organ systems. This time INCLUDES time spent directly as this patient's bedside with evaluation and management, review of chart including review of laboratory and imaging studies, interpretation of vital signs and cardiac output measurements, any necessary ventilator management, and time spent discussing plan of care with patient and family, including goals of care discussion. This also includes time spent in multidisciplinary discussion with care team and various consultants to optimize treatment plan. This time may NOT include various procedures, which will be noted seperately.

## 2024-10-18 NOTE — PROGRESS NOTE ADULT - ASSESSMENT
53 y/o F with PMH antiphospholipid syndrome on coumadin, ?lupus (on plaquenil), Sz d/o? 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 Pt found to be hypotensive in the ED despite 2.5L IVF thus started on pressors and admitted to CCU for further evaluation and management.    Plan:  Neuro:  - C/w home vimpat, pt had ?Sz in CCU w/ grunting and tensed up resolved on its own, obtain EEG, neuro c/s  - CTH no acute findings, chronic infarcts and encephalomalacia  CV:  - On levo 0.09, titrate as tolerated, unclear source of shock, mild leukocytosis ?infection, is being treated for MRSA infective endocarditis, vs cardiogenic  - F/u TTE  Pulm:  - On NC, difficult to obtain pulse ox but pO2 420 on 100% NRB  - CTA chest w/ no PE, some areas of atelectasis, no evidence of pneumonia 53 y/o F with PMH antiphospholipid syndrome on coumadin, ?lupus (on plaquenil), Sz d/o? 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 Pt found to be hypotensive in the ED despite 2.5L IVF thus started on pressors and admitted to CCU for further evaluation and management.    Plan:  Neuro:  - C/w home vimpat, pt had ?Sz in CCU w/ grunting and tensed up resolved on its own, obtain EEG, neuro c/s  - CTH no acute findings, chronic infarcts and encephalomalacia  CV:  - On levo 0.09, titrate as tolerated, unclear source of shock, mild leukocytosis ?infection, is being treated for MRSA infective endocarditis, vs cardiogenic  - F/u TTE  - CTA chest w/ concern for R subclavian thrombus, arterial duplex RUE w/ lack of flow medial portion of stent compatible w/ occluded or almost occluded medial portion of R subclavian stent but with patent flow within the R axillary, brachial, radial and ulnar arteries  - INR subtherapeutic, will start hep gtt for now  Pulm:  - On NC, difficult to obtain pulse ox but pO2 420 on 100% NRB  - CTA chest w/ no PE, some areas of atelectasis, no evidence of pneumonia  GI:  - NPO for now given mental status  - c/w PPI  Renal:  - ESRD, no emergent need for HD now, will start HD tomorrow  - R AVF w/ weak thrill, if not functioning tomorrow may need temporary HD access  ID:  - Was on IV vanc for MRSA endocarditis, last day 10/18 per paperwork, will obtain ID consult  - S/p vanc x1 today, c/w empiric cefepime  - F/u blood cxs, UA negative  Heme:  - c/w hep gtt as above  - Likely chronic anemia in setting of ESRD will trend  Endo:  - Glucose wnl on BMP  - Obtain TSH 55 y/o F with PMH antiphospholipid syndrome on coumadin, ?lupus (on plaquenil), Sz d/o? 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 Pt found to be hypotensive in the ED despite 2.5L IVF thus started on pressors and admitted to CCU for further evaluation and management.    Plan:  Neuro:  - C/w home vimpat, pt had ?Sz in CCU w/ grunting and tensed up resolved on its own, obtain EEG, neuro c/s  - CTH no acute findings, chronic infarcts and encephalomalacia  CV:  - On levo 0.09, titrate as tolerated, unclear source of shock, mild leukocytosis ?infection, is being treated for MRSA infective endocarditis, vs cardiogenic  - F/u TTE  - CTA chest w/ concern for R subclavian thrombus, arterial duplex RUE w/ lack of flow medial portion of stent compatible w/ occluded or almost occluded medial portion of R subclavian stent but with patent flow within the R axillary, brachial, radial and ulnar arteries  - INR subtherapeutic, will start hep gtt for now  Pulm:  - On NC, difficult to obtain pulse ox but pO2 420 on 100% NRB  - CTA chest w/ no PE, some areas of atelectasis, no evidence of pneumonia  GI:  - NPO for now given mental status  - c/w PPI  Renal:  - ESRD, no emergent need for HD now, will start HD tomorrow  - R AVF w/ weak thrill, if not functioning tomorrow may need temporary HD access  ID:  - Was on IV vanc for MRSA endocarditis, last day 10/18 per paperwork, will obtain ID consult  - S/p vanc x1 today, c/w empiric cefepime  - F/u blood cxs, UA negative  Heme:  - c/w hep gtt as above  - Likely chronic anemia in setting of ESRD will trend  Endo:  - Glucose wnl on BMP  - Obtain TSH in AM    Dispo: CCU, on pressors, f/u TTE, c/w empiric abx, HD tomorrow, unable to contact next of kin for decision making, copy of MOLST in chart reflects DNR but not DNI however it is not dated or signed properly, will treat patient as full code until able to contact next of kin,  at [place] gave name of friend Ricky who has been most recent decision maker but no answer at his number, also given number for SW at [facility] (where she was admitted prior to [facility])

## 2024-10-18 NOTE — ED PROVIDER NOTE - CLINICAL SUMMARY MEDICAL DECISION MAKING FREE TEXT BOX
54-year-old patient with past medical history for antiphospholipid syndrome, CVA, presents emergency department for hypoxia, hypotension, AMS.  Plan check labs, x-ray, CT scan Post-Care Instructions: Patient instructed to not lie down for 4 hours and limit physical activity for 24 hours. Patient verbalized understanding.

## 2024-10-18 NOTE — ED ADULT NURSE REASSESSMENT NOTE - NS ED NURSE REASSESS COMMENT FT1
Patient arrived to ED with midline in place. dressing dated 9/8. dressing changed in ED per policy. unable to obtain US guided IV access. per Dr. Dakotah urban to use midline at this time.

## 2024-10-18 NOTE — CONSULT NOTE ADULT - ASSESSMENT
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) via lefr arm picc  rt AVF   SHock r/o sepsis     REC  - no urgent indication for hd now   - no identifiable reachable next of kin/hcp will plan for 2PC for HD consent   - rt AVF with + briuit, weak thrill   - pressor support with levofed  - s/p CTA with A/P await results  - echo   - abg   - fu cx send   dw [facility]olla and Dr White 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) via lefr arm picc  rt AVF   hypercalcemia   SHock r/o sepsis     REC  - no urgent indication for hd now   - no identifiable reachable next of kin/hcp will plan for 2PC for HD consent   - rt AVF with + briuit, weak thrill   - hypercalcemia work-up including spep and upep   - pressor support with levofed  - s/p CTA with A/P await results  - echo   - abg   - fu cx send   dw [facility]EN[facility] Bedolla and Dr Whtie 53 yo with esrd on hd with sle/apl on coumadin   recent MRSA endocard tx with vanc ( comple 10/18) via lefr arm picc  rt AVF   hypercalcemia   SHock r/o sepsis     REC  - no urgent indication for hd now   - no identifiable reachable next of kin/hcp will plan for 2PC for HD consent   - rt AVF with + briuit, weak thrill   - hypercalcemia work-up including spep and upep   - fu pt/inr, pt may require temp hd catheter     coumadin on hold until results available   - pressor support with levophed  - s/p CTA with A/P await results  - echo   - abg   - fu cx send   dw RENITA Bedolla and Dr White

## 2024-10-19 LAB
-  COAGULASE NEGATIVE STAPHYLOCOCCUS: SIGNIFICANT CHANGE UP
24R-OH-CALCIDIOL SERPL-MCNC: 83.8 NG/ML — HIGH (ref 30–80)
ADD ON TEST-SPECIMEN IN LAB: SIGNIFICANT CHANGE UP
ANION GAP SERPL CALC-SCNC: 10 MMOL/L — SIGNIFICANT CHANGE UP (ref 5–17)
APTT BLD: 117.9 SEC — HIGH (ref 24.5–35.6)
APTT BLD: 44.8 SEC — HIGH (ref 24.5–35.6)
APTT BLD: 86 SEC — HIGH (ref 24.5–35.6)
BUN SERPL-MCNC: 53 MG/DL — HIGH (ref 7–23)
CA-I BLD-SCNC: 1.5 MMOL/L — HIGH (ref 1.15–1.33)
CALCIUM SERPL-MCNC: 11.4 MG/DL — HIGH (ref 8.5–10.1)
CHLORIDE SERPL-SCNC: 102 MMOL/L — SIGNIFICANT CHANGE UP (ref 96–108)
CO2 SERPL-SCNC: 27 MMOL/L — SIGNIFICANT CHANGE UP (ref 22–31)
CREAT SERPL-MCNC: 6.11 MG/DL — HIGH (ref 0.5–1.3)
EGFR: 8 ML/MIN/1.73M2 — LOW
GLUCOSE SERPL-MCNC: 125 MG/DL — HIGH (ref 70–99)
GRAM STN FLD: ABNORMAL
GRAM STN FLD: ABNORMAL
HAV IGM SER-ACNC: SIGNIFICANT CHANGE UP
HBV CORE IGM SER-ACNC: SIGNIFICANT CHANGE UP
HBV SURFACE AG SER-ACNC: SIGNIFICANT CHANGE UP
HCT VFR BLD CALC: 25.8 % — LOW (ref 34.5–45)
HCV AB S/CO SERPL IA: 0.14 S/CO — SIGNIFICANT CHANGE UP (ref 0–0.99)
HCV AB SERPL-IMP: SIGNIFICANT CHANGE UP
HGB BLD-MCNC: 7.9 G/DL — LOW (ref 11.5–15.5)
INR BLD: 1.34 RATIO — HIGH (ref 0.85–1.16)
MAGNESIUM SERPL-MCNC: 2 MG/DL — SIGNIFICANT CHANGE UP (ref 1.6–2.6)
MCHC RBC-ENTMCNC: 30.6 GM/DL — LOW (ref 32–36)
MCHC RBC-ENTMCNC: 30.9 PG — SIGNIFICANT CHANGE UP (ref 27–34)
MCV RBC AUTO: 100.8 FL — HIGH (ref 80–100)
METHOD TYPE: SIGNIFICANT CHANGE UP
PHOSPHATE SERPL-MCNC: 5.3 MG/DL — HIGH (ref 2.5–4.5)
PLATELET # BLD AUTO: 147 K/UL — LOW (ref 150–400)
POTASSIUM SERPL-MCNC: 3.7 MMOL/L — SIGNIFICANT CHANGE UP (ref 3.5–5.3)
POTASSIUM SERPL-SCNC: 3.7 MMOL/L — SIGNIFICANT CHANGE UP (ref 3.5–5.3)
PROTHROM AB SERPL-ACNC: 15.8 SEC — HIGH (ref 9.9–13.4)
PTH-INTACT FLD-MCNC: 269 PG/ML — HIGH (ref 15–65)
RBC # BLD: 2.56 M/UL — LOW (ref 3.8–5.2)
RBC # FLD: 19.8 % — HIGH (ref 10.3–14.5)
SODIUM SERPL-SCNC: 139 MMOL/L — SIGNIFICANT CHANGE UP (ref 135–145)
SPECIMEN SOURCE: SIGNIFICANT CHANGE UP
TROPONIN I, HIGH SENSITIVITY RESULT: 281.43 NG/L — HIGH
TSH SERPL-MCNC: 1.92 UU/ML — SIGNIFICANT CHANGE UP (ref 0.34–4.82)
VIT D25+D1,25 OH+D1,25 PNL SERPL-MCNC: 24.5 PG/ML — SIGNIFICANT CHANGE UP (ref 19.9–79.3)
WBC # BLD: 12.4 K/UL — HIGH (ref 3.8–10.5)
WBC # FLD AUTO: 12.4 K/UL — HIGH (ref 3.8–10.5)

## 2024-10-19 PROCEDURE — 99233 SBSQ HOSP IP/OBS HIGH 50: CPT

## 2024-10-19 PROCEDURE — 99223 1ST HOSP IP/OBS HIGH 75: CPT

## 2024-10-19 PROCEDURE — 99291 CRITICAL CARE FIRST HOUR: CPT

## 2024-10-19 RX ORDER — ACETAMINOPHEN 500 MG
1000 TABLET ORAL ONCE
Refills: 0 | Status: COMPLETED | OUTPATIENT
Start: 2024-10-19 | End: 2024-10-19

## 2024-10-19 RX ORDER — LORAZEPAM 2 MG
0.5 TABLET ORAL ONCE
Refills: 0 | Status: DISCONTINUED | OUTPATIENT
Start: 2024-10-19 | End: 2024-10-19

## 2024-10-19 RX ORDER — HYDROMORPHONE HCL/0.9% NACL/PF 6 MG/30 ML
0.5 PATIENT CONTROLLED ANALGESIA SYRINGE INTRAVENOUS ONCE
Refills: 0 | Status: DISCONTINUED | OUTPATIENT
Start: 2024-10-19 | End: 2024-10-19

## 2024-10-19 RX ORDER — DAPTOMYCIN 500 MG/10ML
550 INJECTION, POWDER, LYOPHILIZED, FOR SOLUTION INTRAVENOUS
Refills: 0 | Status: DISCONTINUED | OUTPATIENT
Start: 2024-10-19 | End: 2024-10-21

## 2024-10-19 RX ORDER — LORAZEPAM 2 MG
1 TABLET ORAL ONCE
Refills: 0 | Status: DISCONTINUED | OUTPATIENT
Start: 2024-10-19 | End: 2024-10-19

## 2024-10-19 RX ADMIN — HEPARIN SODIUM 2500 UNIT(S): 10000 INJECTION INTRAVENOUS; SUBCUTANEOUS at 22:05

## 2024-10-19 RX ADMIN — Medication 1000 MILLIGRAM(S): at 12:55

## 2024-10-19 RX ADMIN — Medication 0.5 MILLIGRAM(S): at 12:39

## 2024-10-19 RX ADMIN — Medication 400 MILLIGRAM(S): at 12:39

## 2024-10-19 RX ADMIN — HEPARIN SODIUM 1100 UNIT(S)/HR: 10000 INJECTION INTRAVENOUS; SUBCUTANEOUS at 22:03

## 2024-10-19 RX ADMIN — HEPARIN SODIUM 1000 UNIT(S)/HR: 10000 INJECTION INTRAVENOUS; SUBCUTANEOUS at 14:13

## 2024-10-19 RX ADMIN — CEFEPIME 1000 MILLIGRAM(S): 2 INJECTION, POWDER, FOR SOLUTION INTRAVENOUS at 19:02

## 2024-10-19 RX ADMIN — HEPARIN SODIUM 1000 UNIT(S)/HR: 10000 INJECTION INTRAVENOUS; SUBCUTANEOUS at 19:13

## 2024-10-19 RX ADMIN — HEPARIN SODIUM 1000 UNIT(S)/HR: 10000 INJECTION INTRAVENOUS; SUBCUTANEOUS at 16:28

## 2024-10-19 RX ADMIN — Medication 1 MILLIGRAM(S): at 13:29

## 2024-10-19 RX ADMIN — PANTOPRAZOLE SODIUM 40 MILLIGRAM(S): 40 TABLET, DELAYED RELEASE ORAL at 16:16

## 2024-10-19 RX ADMIN — Medication 1 MILLIGRAM(S): at 19:13

## 2024-10-19 RX ADMIN — DAPTOMYCIN 122 MILLIGRAM(S): 500 INJECTION, POWDER, LYOPHILIZED, FOR SOLUTION INTRAVENOUS at 16:15

## 2024-10-19 RX ADMIN — Medication 0.5 MILLIGRAM(S): at 22:06

## 2024-10-19 RX ADMIN — LACOSAMIDE 100 MILLIGRAM(S): 100 TABLET ORAL at 16:16

## 2024-10-19 RX ADMIN — HEPARIN SODIUM 1000 UNIT(S)/HR: 10000 INJECTION INTRAVENOUS; SUBCUTANEOUS at 06:59

## 2024-10-19 RX ADMIN — NOREPINEPHRINE BITARTRATE 6.33 MICROGRAM(S)/KG/MIN: 1 INJECTION, SOLUTION, CONCENTRATE INTRAVENOUS at 06:30

## 2024-10-19 RX ADMIN — Medication 0.5 MILLIGRAM(S): at 12:55

## 2024-10-19 RX ADMIN — CHLORHEXIDINE GLUCONATE 1 APPLICATION(S): 40 SOLUTION TOPICAL at 06:30

## 2024-10-19 NOTE — PROGRESS NOTE ADULT - SUBJECTIVE AND OBJECTIVE BOX
NEPHROLOGY INTERVAL HPI/OVERNIGHT EVENTS:  10-19-24 @ 11:37        10/19 seen at HD, alert awake   HPI:    S:  Pt seen and examined  54-year-old patient with past medical history for antiphospholipid syndrome, end-stage renal disease on dialysis , acute embolism and thrombosis of the right internal jugular vein, high blood pressure, stroke, endocarditis, high cholesterol, presents emergency department for hypoxia, hypotension, less responsiveness.  Patient is awake,  soft  smoking and  answers few questions.  Patient currently with no pain  .  [facility] called and informed that patient was found this morning satting 60%, blood pressure 87/55 and low temperature of 97.2.  Patient was sent to the ER for further evaluation.  Patient is a DNR, but   Not DNI  ICU called for consult  10/18: Hypotensive despite 1L IVF; add'l ordered, ESRD noted. Pressors ordered. Workup in progress.  (18 Oct 2024 11:35)  Patient is a 54y old  Female who presents with a chief complaint of Shock (18 Oct 2024 11:35)  ==============================================================================  NEPHROLOGY CONSULT  hx from chart and reivew of transfer document   esrd at [facility] on daily hd m-f   on MRSA endocard tx on vanc 500 mg end date 10/18 via left PICC line   admitted for shock with hypoxia   s/p CTA and a/p await results  on levofed   abg pending, sa02 not recordable   lethargic   has hx of APL on coumadin, with SLE on plaquenil   AVF left arm with + thrill and bruit  CCM input noted, unable to identify HCP       PAST MEDICAL & SURGICAL HISTORY:  - esrd [facility] home dialysis   - MRSA endocarditis on vanc until 10/18   - APL on coumadin   - CVA with dysphagia   ESRD on dialysis          MEDICATIONS  (STANDING):  cefepime  Injectable. 1000 milliGRAM(s) IV Push every 24 hours  chlorhexidine 2% Cloths 1 Application(s) Topical <User Schedule>  DAPTOmycin IVPB 550 milliGRAM(s) IV Intermittent every 48 hours  heparin  Infusion.  Unit(s)/Hr (12 mL/Hr) IV Continuous <Continuous>  lacosamide IVPB 150 milliGRAM(s) IV Intermittent every 12 hours  norepinephrine Infusion 0.05 MICROgram(s)/kG/Min (6.33 mL/Hr) IV Continuous <Continuous>  pantoprazole  Injectable 40 milliGRAM(s) IV Push daily    MEDICATIONS  (PRN):  heparin   Injectable 2500 Unit(s) IV Push every 6 hours PRN For aPTT between 40 - 57  heparin   Injectable 5500 Unit(s) IV Push every 6 hours PRN For aPTT less than 40      Allergies    No Known Allergies    Intolerances        I&O's Detail    18 Oct 2024 07:01  -  19 Oct 2024 07:00  --------------------------------------------------------  IN:    Heparin Infusion: 159 mL    IV PiggyBack: 50 mL    Norepinephrine: 233 mL  Total IN: 442 mL    OUT:  Total OUT: 0 mL    Total NET: 442 mL    Patient was seen and evaluated on dialysis.   Patient is tolerating the procedure well.   Continue dialysis:   Dialyzer:     f180 3k uf goal of 2kg   avf bfr 400   low calcium     Vital Signs Last 24 Hrs  T(C): 37.6 (19 Oct 2024 00:27), Max: 37.6 (18 Oct 2024 20:03)  T(F): 99.6 (19 Oct 2024 00:27), Max: 99.7 (18 Oct 2024 20:03)  HR: 95 (19 Oct 2024 11:00) (87 - 105)  BP: 96/41 (19 Oct 2024 11:00) (80/38 - 125/39)  BP(mean): 47 (19 Oct 2024 11:00) (46 - 76)  RR: 22 (19 Oct 2024 11:00) (10 - 31)  SpO2: 100% (19 Oct 2024 11:00) (85% - 100%)    Parameters below as of 18 Oct 2024 14:00  Patient On (Oxygen Delivery Method): nasal cannula  O2 Flow (L/min): 4    Daily     Daily Weight in k (18 Oct 2024 14:00)    PHYSICAL EXAM:  General: alert. awake  HEENT: MMM  CV: s1s2 rrr  LUNGS: B/L CTA  EXT: no edema    LABS:                        7.9    12.40 )-----------( 147      ( 19 Oct 2024 05:42 )             25.8     10-19    139  |  102  |  53[H]  ----------------------------<  125[H]  3.7   |  27  |  6.11[H]    Ca    11.4[H]      19 Oct 2024 05:42  Phos  5.3     10-19  Mg     2.0     10-19    TPro  9.0[H]  /  Alb  2.5[L]  /  TBili  0.6  /  DBili  x   /  AST  57[H]  /  ALT  42  /  AlkPhos  118  1018    PT/INR - ( 19 Oct 2024 05:42 )   PT: 15.8 sec;   INR: 1.34 ratio         PTT - ( 19 Oct 2024 05:42 )  PTT:117.9 sec  Urinalysis Basic - ( 19 Oct 2024 05:42 )    Color: x / Appearance: x / SG: x / pH: x  Gluc: 125 mg/dL / Ketone: x  / Bili: x / Urobili: x   Blood: x / Protein: x / Nitrite: x   Leuk Esterase: x / RBC: x / WBC x   Sq Epi: x / Non Sq Epi: x / Bacteria: x      Magnesium: 2.0 mg/dL (10-19 @ 05:42)  Phosphorus: 5.3 mg/dL (10-19 @ 05:42)  Intact PTH: 269 pg/mL (10-19 @ 05:42)  Vitamin D, 25-Hydroxy: 83.8 ng/mL (10-19 @ 05:42)    ABG - ( 18 Oct 2024 13:59 )  pH, Arterial: 7.42  pH, Blood: x     /  pCO2: 47    /  pO2: 429   / HCO3: 30    / Base Excess: 5.1   /  SaO2: 100

## 2024-10-19 NOTE — CONSULT NOTE ADULT - SUBJECTIVE AND OBJECTIVE BOX
Patient is a 54y old  Female who presents with a chief complaint of Shock (18 Oct 2024 17:04)    HPI:  54-year-old patient with past medical history for antiphospholipid syndrome, end-stage renal disease on dialysis Monday Wednesday Friday, acute embolism and thrombosis of the right internal jugular vein, high blood pressure, stroke, endocarditis, high cholesterol, presents emergency department for hypoxia, hypotension, less responsiveness.  Patient is awake,  soft  smoking and  answers few questions.  Patient currently with no pain  [facility] called and informed that patient was found this morning satting 60%, blood pressure 87/55 and low temperature of 97.2.  Patient was sent to the ER for further evaluation.  Patient is a DNR, but   Not DNI  10/18: Hypotensive despite 1L IVF; add'l ordered, ESRD noted. Pressors ordered. Workup in progress.  (18 Oct 2024 11:35)  Above HPI reviewed and reconciled with patient      PAST MEDICAL & SURGICAL HISTORY:  ESRD on dialysis      History of stroke        FAMILY HISTORY:    Social Hx: Denies alcohol, tobacco, recreational drug use    Allergies  No Known Allergies    ANTIMICROBIALS (past 90 days)  MEDICATIONS  (STANDING):    cefepime  Injectable.   1000 milliGRAM(s) IV Push (10-18-24 @ 09:03)    vancomycin  IVPB   250 mL/Hr IV Intermittent (10-18-24 @ 10:25)        ACTIVE ANTIMICROBIALS  Vancomycin 1000mg x1 (10/18)  cefepime  Injectable. 1000 every 24 hours (10/18 --- )    MEDICATIONS  (STANDING):  heparin   Injectable 2500 every 6 hours PRN  heparin   Injectable 5500 every 6 hours PRN  heparin  Infusion.  <Continuous>  lacosamide IVPB 150 every 12 hours  norepinephrine Infusion 0.05 <Continuous>  pantoprazole  Injectable 40 daily      REVIEW OF SYSTEMS  [  ] ROS unobtainable because:    [  ] All other systems negative except as noted below:	    Constitutional:  [ ] fever [ ] chills  [ ] weight loss  [ ] weakness  Skin:  [ ] rash [ ] phlebitis	  Eyes: [ ] icterus [ ] pain  [ ] discharge	  ENMT: [ ] sore throat  [ ] thrush [ ] ulcers [ ] exudates  Respiratory: [ ] dyspnea [ ] hemoptysis [ ] cough [ ] sputum	  Cardiovascular:  [ ] chest pain [ ] palpitations [ ] edema	  Gastrointestinal:  [ ] nausea [ ] vomiting [ ] diarrhea [ ] constipation [ ] pain	  Genitourinary:  [ ] dysuria [ ] frequency [ ] hematuria [ ] discharge [ ] flank pain  [ ] incontinence  Musculoskeletal:  [ ] myalgias [ ] arthralgias [ ] arthritis  [ ] back pain  Neurological:  [ ] headache [ ] seizures  [ ] confusion/altered mental status  Psychiatric:  [ ] anxiety [ ] depression	  Hematology/Lymphatics:  [ ] lymphadenopathy  Endocrine:  [ ] adrenal [ ] thyroid  Allergic/Immunologic:	 [ ] transplant [ ] seasonal    Vital Signs Last 24 Hrs  T(C): 37.6 (19 Oct 2024 00:27), Max: 37.6 (18 Oct 2024 20:03)  T(F): 99.6 (19 Oct 2024 00:27), Max: 99.7 (18 Oct 2024 20:03)  HR: 87 (19 Oct 2024 06:00) (87 - 108)  BP: 111/25 (19 Oct 2024 06:00) (55/46 - 120/41)  BP(mean): 50 (19 Oct 2024 06:00) (40 - 83)  RR: 16 (19 Oct 2024 06:00) (10 - 31)  SpO2: 100% (19 Oct 2024 06:00) (80% - 100%)    Parameters below as of 18 Oct 2024 14:00  Patient On (Oxygen Delivery Method): nasal cannula  O2 Flow (L/min): 4      Physical Exam:  Constitutional:  well preserved, comfortable  Head/Eyes: no icterus  LUNGS:  CTA  CVS:  regular rhythm  Abd:  soft, non-tender; non-distended  Ext:  no edema  Vascular:  IV site no erythema tenderness or discharge  Neuro: AAO X 3, non- focal    Labs: all available labs reviewed                        7.9    12.40 )-----------( 147      ( 19 Oct 2024 05:42 )             25.8     10-19    139  |  102  |  53[H]  ----------------------------<  125[H]  3.7   |  27  |  6.11[H]    Ca    11.4[H]      19 Oct 2024 05:42  Phos  5.3     10-19  Mg     2.0     10-19    TPro  9.0[H]  /  Alb  2.5[L]  /  TBili  0.6  /  DBili  x   /  AST  57[H]  /  ALT  42  /  AlkPhos  118  10-18     LIVER FUNCTIONS - ( 18 Oct 2024 07:32 )  Alb: 2.5 g/dL / Pro: 9.0 gm/dL / ALK PHOS: 118 U/L / ALT: 42 U/L / AST: 57 U/L / GGT: x           Urinalysis Basic - ( 19 Oct 2024 05:42 )    Color: x / Appearance: x / SG: x / pH: x  Gluc: 125 mg/dL / Ketone: x  / Bili: x / Urobili: x   Blood: x / Protein: x / Nitrite: x   Leuk Esterase: x / RBC: x / WBC x   Sq Epi: x / Non Sq Epi: x / Bacteria: x          Radiology: all available radiological tests reviewed  < from: CT Abdomen and Pelvis w/ IV Cont (10.18.24 @ 12:40) >  IMPRESSION:  No pulmonary embolism.    Focal nonopacification of a portion of the right subclavian artery stent,   suspicious for thrombus. Correlate with Doppler imaging.    Enlarged myomatous uterus, with large pedunculated 14cm fibroid extending   up to the left hemiabdomen.  Recommend GYN evaluation.    < end of copied text >      Advanced directives addressed: full resuscitation Patient is a 54y old  Female who presents with a chief complaint of Shock (18 Oct 2024 17:04)    HPI:  54-year-old patient with past medical history for antiphospholipid syndrome, end-stage renal disease on dialysis Monday Wednesday Friday, acute embolism and thrombosis of the right internal jugular vein, high blood pressure, stroke, endocarditis, high cholesterol, presents emergency department for hypoxia, hypotension, less responsiveness.  Patient is awake,  soft  smoking and  answers few questions.  Patient currently with no pain  [facility] called and informed that patient was found this morning satting 60%, blood pressure 87/55 and low temperature of 97.2.  Patient was sent to the ER for further evaluation.  Patient is a DNR, but   Not DNI  10/18: Hypotensive despite 1L IVF; add'l ordered, ESRD noted. Pressors ordered. Workup in progress.  (18 Oct 2024 11:35)  Above HPI reviewed and reconciled with patient      PAST MEDICAL & SURGICAL HISTORY:  ESRD on dialysis      History of stroke        FAMILY HISTORY:    Social Hx: unable to obtain due to mental status      Allergies  No Known Allergies    ANTIMICROBIALS (past 90 days)  MEDICATIONS  (STANDING):    cefepime  Injectable.   1000 milliGRAM(s) IV Push (10-18-24 @ 09:03)    vancomycin  IVPB   250 mL/Hr IV Intermittent (10-18-24 @ 10:25)        ACTIVE ANTIMICROBIALS  Vancomycin 1000mg x1 (10/18)  cefepime  Injectable. 1000 every 24 hours (10/18 --- )    MEDICATIONS  (STANDING):  heparin   Injectable 2500 every 6 hours PRN  heparin   Injectable 5500 every 6 hours PRN  heparin  Infusion.  <Continuous>  lacosamide IVPB 150 every 12 hours  norepinephrine Infusion 0.05 <Continuous>  pantoprazole  Injectable 40 daily      REVIEW OF SYSTEMS  [ x] ROS unobtainable because:  mental status  [  ] All other systems negative except as noted below:	    Constitutional:  [ ] fever [ ] chills  [ ] weight loss  [ ] weakness  Skin:  [ ] rash [ ] phlebitis	  Eyes: [ ] icterus [ ] pain  [ ] discharge	  ENMT: [ ] sore throat  [ ] thrush [ ] ulcers [ ] exudates  Respiratory: [ ] dyspnea [ ] hemoptysis [ ] cough [ ] sputum	  Cardiovascular:  [ ] chest pain [ ] palpitations [ ] edema	  Gastrointestinal:  [ ] nausea [ ] vomiting [ ] diarrhea [ ] constipation [ ] pain	  Genitourinary:  [ ] dysuria [ ] frequency [ ] hematuria [ ] discharge [ ] flank pain  [ ] incontinence  Musculoskeletal:  [ ] myalgias [ ] arthralgias [ ] arthritis  [ ] back pain  Neurological:  [ ] headache [ ] seizures  [ ] confusion/altered mental status  Psychiatric:  [ ] anxiety [ ] depression	  Hematology/Lymphatics:  [ ] lymphadenopathy  Endocrine:  [ ] adrenal [ ] thyroid  Allergic/Immunologic:	 [ ] transplant [ ] seasonal    Vital Signs Last 24 Hrs  T(C): 37.6 (19 Oct 2024 00:27), Max: 37.6 (18 Oct 2024 20:03)  T(F): 99.6 (19 Oct 2024 00:27), Max: 99.7 (18 Oct 2024 20:03)  HR: 87 (19 Oct 2024 06:00) (87 - 108)  BP: 111/25 (19 Oct 2024 06:00) (55/46 - 120/41)  BP(mean): 50 (19 Oct 2024 06:00) (40 - 83)  RR: 16 (19 Oct 2024 06:00) (10 - 31)  SpO2: 100% (19 Oct 2024 06:00) (80% - 100%)    Parameters below as of 18 Oct 2024 14:00  Patient On (Oxygen Delivery Method): nasal cannula  O2 Flow (L/min): 4      Physical Exam:  Constitutional: NAD  Head/Eyes: no icterus  LUNGS:  crackles  CVS:  regular rhythm  Abd:  soft, non-tender; non-distended  Ext:  no edema  Vascular:  IV site no erythema tenderness or discharge  Neuro: AAO X 1    Labs: all available labs reviewed                        7.9    12.40 )-----------( 147      ( 19 Oct 2024 05:42 )             25.8     10-19    139  |  102  |  53[H]  ----------------------------<  125[H]  3.7   |  27  |  6.11[H]    Ca    11.4[H]      19 Oct 2024 05:42  Phos  5.3     10-19  Mg     2.0     10-19    TPro  9.0[H]  /  Alb  2.5[L]  /  TBili  0.6  /  DBili  x   /  AST  57[H]  /  ALT  42  /  AlkPhos  118  10-18     LIVER FUNCTIONS - ( 18 Oct 2024 07:32 )  Alb: 2.5 g/dL / Pro: 9.0 gm/dL / ALK PHOS: 118 U/L / ALT: 42 U/L / AST: 57 U/L / GGT: x           Urinalysis Basic - ( 19 Oct 2024 05:42 )    Color: x / Appearance: x / SG: x / pH: x  Gluc: 125 mg/dL / Ketone: x  / Bili: x / Urobili: x   Blood: x / Protein: x / Nitrite: x   Leuk Esterase: x / RBC: x / WBC x   Sq Epi: x / Non Sq Epi: x / Bacteria: x          Radiology: all available radiological tests reviewed  < from: CT Abdomen and Pelvis w/ IV Cont (10.18.24 @ 12:40) >  IMPRESSION:  No pulmonary embolism.    Focal nonopacification of a portion of the right subclavian artery stent,   suspicious for thrombus. Correlate with Doppler imaging.    Enlarged myomatous uterus, with large pedunculated 14cm fibroid extending   up to the left hemiabdomen.  Recommend GYN evaluation.    < end of copied text >      Advanced directives addressed: full resuscitation

## 2024-10-19 NOTE — DIETITIAN INITIAL EVALUATION ADULT - NSFNSGIIOFT_GEN_A_CORE
I&O's Detail    18 Oct 2024 07:01  -  19 Oct 2024 07:00  --------------------------------------------------------  IN:    Heparin Infusion: 159 mL    IV PiggyBack: 50 mL    Norepinephrine: 233 mL  Total IN: 442 mL    OUT:  Total OUT: 0 mL    Total NET: 442 mL

## 2024-10-19 NOTE — PROGRESS NOTE ADULT - SUBJECTIVE AND OBJECTIVE BOX
OVERNIGHT EVENTS / SUBJECTIVE: Patient seen and examined at bedside.     Pt remains on levo 0.1, on hep gtt. Blood cxs w/ GPCs this AM. No further reported Sz since yesterday afternoon. This AM pt opens her eyes to voice, not responding appropriately to questions or commands.      OBJECTIVE:    VITAL SIGNS:  ICU Vital Signs Last 24 Hrs  T(C): 36.9 (19 Oct 2024 11:45), Max: 37.6 (18 Oct 2024 20:03)  T(F): 98.4 (19 Oct 2024 11:45), Max: 99.7 (18 Oct 2024 20:03)  HR: 93 (19 Oct 2024 14:00) (87 - 122)  BP: 86/19 (19 Oct 2024 14:00) (70/24 - 138/100)  BP(mean): 39 (19 Oct 2024 14:00) (31 - 111)  ABP: --  ABP(mean): --  RR: 10 (19 Oct 2024 14:00) (10 - 35)  SpO2: 95% (19 Oct 2024 13:51) (93% - 100%)          10-18 @ 07:01  -  10-19 @ 07:00  --------------------------------------------------------  IN: 442 mL / OUT: 0 mL / NET: 442 mL      CAPILLARY BLOOD GLUCOSE      POCT Blood Glucose.: 117 mg/dL (18 Oct 2024 07:24)      PHYSICAL EXAM:    General: In NAD  HEENT: NC/AT; clear conjunctiva  Neck: supple  Respiratory: CTA b/l  Cardiovascular: +S1/S2; RRR, R AVF w/ weak thrill  Abdomen: soft, nondistended, nontender  Extremities: Warm, no LE edema  Skin: No rash  Neurological: Opens eyes to voice, not following commands or answering other questions, responds to pain    MEDICATIONS:  MEDICATIONS  (STANDING):  cefepime  Injectable. 1000 milliGRAM(s) IV Push every 24 hours  chlorhexidine 2% Cloths 1 Application(s) Topical <User Schedule>  DAPTOmycin IVPB 550 milliGRAM(s) IV Intermittent every 48 hours  heparin  Infusion.  Unit(s)/Hr (12 mL/Hr) IV Continuous <Continuous>  lacosamide IVPB 150 milliGRAM(s) IV Intermittent every 12 hours  norepinephrine Infusion 0.05 MICROgram(s)/kG/Min (6.33 mL/Hr) IV Continuous <Continuous>  pantoprazole  Injectable 40 milliGRAM(s) IV Push daily    MEDICATIONS  (PRN):  heparin   Injectable 2500 Unit(s) IV Push every 6 hours PRN For aPTT between 40 - 57  heparin   Injectable 5500 Unit(s) IV Push every 6 hours PRN For aPTT less than 40      ALLERGIES:  Allergies    No Known Allergies    Intolerances        LABS:                        7.9    12.40 )-----------( 147      ( 19 Oct 2024 05:42 )             25.8     Hemoglobin: 7.9 g/dL (10-19 @ 05:42)  Hemoglobin: 8.5 g/dL (10-18 @ 23:08)  Hemoglobin: 9.6 g/dL (10-18 @ 07:32)    CBC Full  -  ( 19 Oct 2024 05:42 )  WBC Count : 12.40 K/uL  RBC Count : 2.56 M/uL  Hemoglobin : 7.9 g/dL  Hematocrit : 25.8 %  Platelet Count - Automated : 147 K/uL  Mean Cell Volume : 100.8 fl  Mean Cell Hemoglobin : 30.9 pg  Mean Cell Hemoglobin Concentration : 30.6 gm/dL  Auto Neutrophil # : x  Auto Lymphocyte # : x  Auto Monocyte # : x  Auto Eosinophil # : x  Auto Basophil # : x  Auto Neutrophil % : x  Auto Lymphocyte % : x  Auto Monocyte % : x  Auto Eosinophil % : x  Auto Basophil % : x    10-19    139  |  102  |  53[H]  ----------------------------<  125[H]  3.7   |  27  |  6.11[H]    Ca    11.4[H]      19 Oct 2024 05:42  Phos  5.3     10-19  Mg     2.0     10-19    TPro  9.0[H]  /  Alb  2.5[L]  /  TBili  0.6  /  DBili  x   /  AST  57[H]  /  ALT  42  /  AlkPhos  118  10-18    Creatinine Trend: 6.11<--, 5.07<--  LIVER FUNCTIONS - ( 18 Oct 2024 07:32 )  Alb: 2.5 g/dL / Pro: 9.0 gm/dL / ALK PHOS: 118 U/L / ALT: 42 U/L / AST: 57 U/L / GGT: x           PT/INR - ( 19 Oct 2024 05:42 )   PT: 15.8 sec;   INR: 1.34 ratio         PTT - ( 19 Oct 2024 13:17 )  PTT:86.0 sec    hs Troponin:    ABG - ( 18 Oct 2024 13:59 )  pH, Arterial: 7.42  pH, Blood: x     /  pCO2: 47    /  pO2: 429   / HCO3: 30    / Base Excess: 5.1   /  SaO2: 100       Urinalysis Basic - ( 19 Oct 2024 05:42 )    Color: x / Appearance: x / SG: x / pH: x  Gluc: 125 mg/dL / Ketone: x  / Bili: x / Urobili: x   Blood: x / Protein: x / Nitrite: x   Leuk Esterase: x / RBC: x / WBC x   Sq Epi: x / Non Sq Epi: x / Bacteria: x    CSF:    EKG:   MICROBIOLOGY:    Culture - Blood (collected 18 Oct 2024 09:01)  Source: .Blood BLOOD  Gram Stain (19 Oct 2024 10:25):    Growth in aerobic bottle: Gram Positive Cocci in Clusters  Preliminary Report (19 Oct 2024 10:25):    Growth in aerobic bottle: Gram Positive Cocci in Clusters    Direct identification is available within approximately 3-5    hours either by Blood Panel Multiplexed PCR or Direct    MALDI-TOF. Details: [email or web address]  Organism: Blood Culture PCR (19 Oct 2024 12:50)  Organism: Blood Culture PCR (19 Oct 2024 12:50)    Culture - Blood (collected 18 Oct 2024 07:32)  Source: .Blood BLOOD  Preliminary Report (19 Oct 2024 13:01):    No growth at 24 hours    Urinalysis with Rflx Culture (collected 18 Oct 2024 07:30)      IMAGING:      Labs, imaging, EKG personally reviewed    RADIOLOGY & ADDITIONAL TESTS: Reviewed.

## 2024-10-19 NOTE — DIETITIAN INITIAL EVALUATION ADULT - NSFNSPHYEXAMSKINFT_GEN_A_CORE
Pressure Injury 1: sacrum, Unstageable  Pressure Injury 2: Left:, heel, Suspected deep tissue injury  Pressure Injury 3: Right:, heel, Suspected deep tissue injury  Pressure Injury 4: Left:, gluteal fold, Stage II  Pressure Injury 5: Left:, gluteal, Stage II    Jeremias Score 10.

## 2024-10-19 NOTE — DIETITIAN INITIAL EVALUATION ADULT - PERTINENT MEDS FT
MEDICATIONS  (STANDING):  cefepime  Injectable. 1000 milliGRAM(s) IV Push every 24 hours  chlorhexidine 2% Cloths 1 Application(s) Topical <User Schedule>  heparin  Infusion.  Unit(s)/Hr (12 mL/Hr) IV Continuous <Continuous>  lacosamide IVPB 150 milliGRAM(s) IV Intermittent every 12 hours  norepinephrine Infusion 0.05 MICROgram(s)/kG/Min (6.33 mL/Hr) IV Continuous <Continuous>  pantoprazole  Injectable 40 milliGRAM(s) IV Push daily    MEDICATIONS  (PRN):  heparin   Injectable 5500 Unit(s) IV Push every 6 hours PRN For aPTT less than 40  heparin   Injectable 2500 Unit(s) IV Push every 6 hours PRN For aPTT between 40 - 57    Home Medications:  busPIRone 10 mg oral tablet: 1 tab(s) orally 2 times a day (18 Oct 2024 11:16)  lacosamide 150 mg oral tablet: 1 tab(s) orally 2 times a day (18 Oct 2024 11:16)  Lexapro 20 mg oral tablet: 1 tab(s) orally once a day (18 Oct 2024 11:16)  Lipitor 40 mg oral tablet: 1 tab(s) orally once a day (at bedtime) (18 Oct 2024 11:16)  metoprolol succinate 25 mg oral capsule, extended release: 1 cap(s) orally once a day (18 Oct 2024 11:16)  miconazole 2% topical ointment: Apply topically to affected area 2 times a day apply to groin, perinium, buttocks (18 Oct 2024 11:16)  NIFEdipine (Eqv-Adalat CC) 30 mg oral tablet, extended release: 1 tab(s) orally once a day (18 Oct 2024 11:16)  Plaquenil 200 mg oral tablet: 2 tab(s) orally once a day (18 Oct 2024 11:16)  Polyethylene Glycol 3350: 17 billion vector genomes orally once a day (18 Oct 2024 11:16)  Protonix 40 mg oral delayed release tablet: 1 tab(s) orally once a day (18 Oct 2024 11:16)  Santyl 250 units/g topical ointment: Apply topically to affected area once a day apply to sacrum (18 Oct 2024 11:16)  senna (sennosides) 8.6 mg oral tablet: 1 tab(s) orally once a day (18 Oct 2024 11:16)  traMADol 50 mg oral tablet: 1 tab(s) orally every 8 hours as needed for  severe pain (18 Oct 2024 11:16)  traZODone 100 mg oral tablet: 1 tab(s) orally once a day (at bedtime) (18 Oct 2024 11:16)  Tylenol Caplet 325 mg oral tablet: 2 tab(s) orally every 6 hours as needed for fever and moderate pain (18 Oct 2024 11:16)  warfarin 6 mg oral tablet: 1 tab(s) orally once a day (18 Oct 2024 11:16)  Xanax 0.5 mg oral tablet: 1 tab(s) orally once a day (18 Oct 2024 11:16)

## 2024-10-19 NOTE — DIETITIAN INITIAL EVALUATION ADULT - ADD RECOMMEND
1) Advance diet to No Concentrated Phosphorus as soon as medically feasible  2) Add LPS BID (provides 100 kcal, 15 g protein/ serving) to optimize nutritional needs when diet is advanced  3) Obtain vitamin D 25OH level to assess nutriture  4) Please obtain daily weights  5) Recommend to add Nephrovite, Vit C 500 mg daily, add Zinc Sulfate 220 mg x 10 days to promote wound healing.   6) Encourage protein-rich foods, maximize food preferences  7) Monitor bowel movements, if no BM for >3 days, consider implementing bowel regimen.  8) If pt remains lethargic or with poor po intake, recommend placing corpak + bridle and initiating EN to bridge PO intake to meet 100% of ENN.   9) Confirm goals of care regarding nutrition support  RD will continue to monitor PO intake, labs, hydration, and wt prn.

## 2024-10-19 NOTE — DIETITIAN INITIAL EVALUATION ADULT - ORAL INTAKE PTA/DIET HISTORY
Unable to obtain intake/ diet hx pta 2/2 pt lethargic at time of visit. From [facility]: Renal diet w/ 1500cc fluid restriction and LPS BID

## 2024-10-19 NOTE — DIETITIAN INITIAL EVALUATION ADULT - PERTINENT LABORATORY DATA
10-19    139  |  102  |  53[H]  ----------------------------<  125[H]  3.7   |  27  |  6.11[H]    Ca    11.4[H]      19 Oct 2024 05:42  Phos  5.3     10-19  Mg     2.0     10-19    TPro  9.0[H]  /  Alb  2.5[L]  /  TBili  0.6  /  DBili  x   /  AST  57[H]  /  ALT  42  /  AlkPhos  118  10-18

## 2024-10-19 NOTE — DIETITIAN INITIAL EVALUATION ADULT - OTHER INFO
55 y/o F with a PMHx of antiphospholipid syndrome, end-stage renal disease on dialysis Monday Wednesday Friday, acute embolism and thrombosis of the right internal jugular vein, high blood pressure, stroke, endocarditis, high cholesterol, presented to emergency department for hypoxia, hypotension, less responsiveness. [facility] called and informed that patient was found this morning satting 60%, blood pressure 87/55 and low temperature of 97.2. Admitted for shock and AHRF; admitted to CCU. Full Code.    Unable to obtain meaningful information 2/2 pt lethargic at time of visit; currently NPO. RD obtained bedscale wt on 10/19 - 162#. No weight hx to review. NFPE reveals no muscle/ fat wasting, pt does not meet criteria for PCM at this time. Recommend to advance diet to No Concentrated Phosphorus as soon as medically feasible. Will add LPS BID as pt receives at NH. If pt remains lethargic or with poor po intake, recommend placing corpak + bridle and initiating EN to bridge PO intake to meet 100% of ENN. Please see additional recommendations below.

## 2024-10-19 NOTE — CONSULT NOTE ADULT - SUBJECTIVE AND OBJECTIVE BOX
CC: 54y old  Female who presents with a chief complaint of Shock (19 Oct 2024 11:36)      HPI:  54-year-old female with past medical history remarkable for APLS, HTN,  HLD, CVA with residual aphasia, ESRD on dialysis 3 times a week, thrombosis/embolism right internal jugular vein and recent treated at [facility] for endocarditis was on Vanco, presented to [facility] ED from SNF with less responsiveness, hypotension and hypoxia. Per [facility] pt was found sating 60%, BP 87/55 and low temperature of 97.2.  Patient has been admitted to CCU, has been followed by nephrology for ESRD - creatinine 6.11, treated with pressors and for shock. Neurology was consulted to rule out seizure as patient was noted to have slight stiffening of the extremities on 10/18 per the intensivist Dr. White, patient is on lacosamide 150 mg bid.    Reviewing notes from [facility], patient is on Plaquenil ? lupus versus connective tissue disorder, she is also on Coumadin for possible APLS and right IJ thrombus, INR was subtherapeutic, 1. 43 -> 1.34, is on IV heparin infusion, history of CVA.  There is no documented history of seizures, we are unable to reach any family to get more info. On exam, patient is awake and alert, she seems to comprehend, she tries to articulate but there is no verbal output, she tries to formulate words with her lips, she barely follows some instructions but not commands.  She moves both upper extremities well, she withdraws both lower extremities legs seem spastic    CT head reveals right frontotemporal and simple no Mr. Gliosis, white matter ischemic disease and chronic infarct left thalamus in addition to generalized volume loss.        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 1000 milliGRAM(s) IV Push every 24 hours  chlorhexidine 2% Cloths 1 Application(s) Topical <User Schedule>  DAPTOmycin IVPB 550 milliGRAM(s) IV Intermittent every 48 hours  heparin  Infusion.  Unit(s)/Hr (12 mL/Hr) IV Continuous <Continuous>  lacosamide IVPB 150 milliGRAM(s) IV Intermittent every 12 hours  norepinephrine Infusion 0.05 MICROgram(s)/kG/Min (6.33 mL/Hr) IV Continuous <Continuous>  pantoprazole  Injectable 40 milliGRAM(s) IV Push daily       Allergies    No Known Allergies    Intolerances      ROS: Pertinent positives in HPI, all other ROS unable to obtain.        Vital Signs Last 24 Hrs  T(C): 36.9 (19 Oct 2024 11:45), Max: 37.6 (18 Oct 2024 20:03)  T(F): 98.4 (19 Oct 2024 11:45), Max: 99.7 (18 Oct 2024 20:03)  HR: 102 (19 Oct 2024 12:45) (87 - 120)  BP: 103/38 (19 Oct 2024 12:45) (80/38 - 138/100)  BP(mean): 52 (19 Oct 2024 12:45) (46 - 111)  RR: 21 (19 Oct 2024 12:45) (13 - 35)  SpO2: 96% (19 Oct 2024 11:57) (93% - 100%)    Parameters below as of 18 Oct 2024 14:00  Patient On (Oxygen Delivery Method): nasal cannula  O2 Flow (L/min): 4      Gen exam:  Normocephalic, not in distress, lying in bed with eyes open.  HEENT: PERRLA, left gaze  Neck: Supple.  Respiratory: Breath sounds are clear bilaterally  Cardiovascular: S1 and S2, regular   Extremities:  no edema  Vascular: Caritid Bruit - no  Musculoskeletal: LE spastic  Skin: No rashes      Neurological exam:  HF: Awake and alert, she seems to comprehend, tries to articulate and formulate words with her lips, no verbal output, aphasia, barely follows some instructions    CN: GAY, Gaze palsy to left, tracks eyes, no gross NLFD, tongue midline,   Motor: Unable to test for drift as she does not maintain sustained posture, moves both extremities antigravity, lower extremity spastic but able to move within the bed.  Sensory:  withdraws all 4 extremities to noxious  Coordination: unable to assess  Reflexes: upper extremities 2+, lower extremities unable to assess  Gait: unable to assess        Labs:   10-19    139  |  102  |  53[H]  ----------------------------<  125[H]  3.7   |  27  |  6.11[H]    Ca    11.4[H]      19 Oct 2024 05:42  Phos  5.3     10-19  Mg     2.0     10-19    TPro  9.0[H]  /  Alb  2.5[L]  /  TBili  0.6  /  DBili  x   /  AST  57[H]  /  ALT  42  /  AlkPhos  118  10-18                          7.9    12.40 )-----------( 147      ( 19 Oct 2024 05:42 )             25.8       Radiology:  < from: CT Head No Cont (10.18.24 @ 10:03) >  There is diffuse prompt cortical sulci, fissures and ventricles related   to generalized volume loss greater than typical for patient age. There is   right frontoparietal encephalomalacia/gliosis with internal   calcifications. There is extensive periventricular and subcortical white   matter hypodensities likely related to small vessel ischemic   disease/chronic infarct. There is a chronic left thalamic lacunar   infarct. There is no midline shift.    There is no acute intracranial hemorrhage. There are no abnormal   extra-axial collections. There is a partially empty sella turcica. There   is diffuse atherosclerotic  calcifications of the distal internal carotid   arteries.    The visualized orbits are intact. There is no acute sinusitis. The   tympanomastoid cavities are well-aerated.      IMPRESSION:    No acute intracranial  hemorrhage or midline shift.  Right frontal temporal encephalomalacia/gliosis.  Extensive white matter small vessel ischemic disease/chronic infarct.  Chronic left thalamic infarct.  Generalized volume loss with ventricular dilatation.

## 2024-10-19 NOTE — PROGRESS NOTE ADULT - ASSESSMENT
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) via lefr arm picc  rt AVF   hypercalcemia   SHock r/o sepsis     REC  - no urgent indication for hd now   - no identifiable reachable next of kin/hcp will plan for 2PC for HD consent   - rt AVF with + briuit, weak thrill   - hypercalcemia work-up including spep and upep   - fu pt/inr, pt may require temp hd catheter     coumadin on hold until results available   - pressor support with levophed  - s/p CTA with A/P await results  - echo   - abg   - fu cx send   dw RENITA Bedolla and Dr White    10/19 MK   - ESRD     hd today and will attempt uf with hd     via avf   - APL no now on iv heparin    rt subclavian clot     hx of cva with aphasia   - GPC sepsis     dapto and cefepime as per ID, renally dosed     fu cx     trend of outpt cx noted from id noted in sept cleared bacteremia     TTE no vegetation     ? need to dc PICC line   deion white and ccu team

## 2024-10-19 NOTE — CONSULT NOTE ADULT - ASSESSMENT
54-year-old female with PMHx  of APLS, HTN,  HLD, CVA with residual aphasia, ESRD on dialysis 3 / week, thrombosis/embolism right IJ, recently treated for MRSA endocarditis at [facility], on Vancomycin via PICC line (last day 10/18), presented to [facility] ED from [place] [facility] with less responsiveness, hypotension and hypoxia.  Patient admitted to CCU, followed by nephrology for ESRD - creatinine 6.11, treated with pressors and for shock. Neurology consulted to rule out seizure as patient was noted to have slight stiffening of the extremities on 10/18 per intensivist Dr. White, patient is on lacosamide 150 mg bid.     Patient is on Plaquenil ? lupus versus connective tissue disorder, she is also on Coumadin, INR was subtherapeutic, 1. 43 -> 1.34, is on IV heparin infusion.  There is no documented history of seizures, we are unable to reach any family to get more info. On exam, patient is awake and alert, she seems to comprehend, she tries to articulate but there is no verbal output, she tries to formulate words with her lips, she barely follows some instructions but not commands.  She moves both upper extremities well, she withdraws both lower extremities legs seem spastic.      # Possible history of seizures, has been on lacosamide 150 Mg twice daily, seizure history unknown, possible breakthrough seizure      – Obtain routine EEG today, if abnormal then may consider 24-hour EEG monitoring  – Continue lacosamide 150 Mg twice daily, it is renally cleared, nephrologist made aware      # History of CVA with significant disabilities including aphasia, numerous other risk factors i.e. APLS, possible connective tissue disorder - INR subtherapeutic    -- Continue with IV heparin, bring INR > 2.5  – May obtain MRI of the brain with and without contrast once stable to rule out any inflammatory ischemic lesions      Above discussed with Dr. White and Dr. Josep Box is on service from 10/20 she will follow-up

## 2024-10-19 NOTE — CONSULT NOTE ADULT - ASSESSMENT
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 1000 every 24 hours (10/18 --- )    Impression:   #Acute Hypoxic Respiratory Failure  #Hypotension, Lethargy  #Recent MRSA Endocarditis on treatment   #ESRD on HD    Recommendations:    - follow up BCx x2      Clinical team may change from intravenous to oral antibiotics when the following criteria are met:   1. Patient is clinically improving/stable       a)	Improved signs and symptoms of infection from initial presentation       b)	Afebrile for 24 hours       c)	Leukocytosis trending towards normal range   2. Patient is tolerating oral intake   3. Initial/repeat blood cultures are negative OR do not need to wait for preliminary blood cultures to result    When above criteria met, may change iv antibiotics to: agent, dose, frequency, duration.  Cannot advise changing to oral antibiotic therapy until culture sensitivity is available.   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 1000 every 24 hours (10/18 --- )    Impression:   #Acute Hypoxic Respiratory Failure  #Hypotension, Lethargy  #Recent MRSA Endocarditis on treatment   #ESRD on HD  - remains afebrile, limited ROS, AAOx1 currently, unable to provide further history  - No other information on chart aside from enddate 10/18/2024 of Vanco for endocarditis, though EMR does show that MRSA BCx positive 9/2 with subsequent clearance 9/5 and 9/24  - continue empiric for now until evidence of BCx negative    Recommendations:  - continue Vancomycin by level, check level tomorrow AM  - continue Cefepime 1G q24 (renally dosed)  - monitor temperature curve  - trend WBC  - side effects of antibiotic discussed, tolerating abx well so far  - follow up BCx x2  - obtain further collateral info if possible    Clinical team may change from intravenous to oral antibiotics when the following criteria are met:   1. Patient is clinically improving/stable       a)	Improved signs and symptoms of infection from initial presentation       b)	Afebrile for 24 hours       c)	Leukocytosis trending towards normal range   2. Patient is tolerating oral intake   3. Initial/repeat blood cultures are negative OR do not need to wait for preliminary blood cultures to result    Cannot advise changing to oral antibiotic therapy until culture sensitivity is available.   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), ESRD on HD M/W/F, R IJ VTE, CVA, HTN, recent discharge [facility] for MRSA Endocarditis on Vancomycin via PICC line (last day 10/18) presents from [facility] 10/18 for hypoxia, hypotension and lethargy  Afebrile on admission, on NRB > 4L NC, Hypotensive on pressors  WBC 12  Cr 6  UA negative, RVP negative  CTA without PE, R subclavian artery stent thrombosis  CTAP grossly negative for infectious etiology  TTE without obvious vegetation   BCx 10/18 GPC    Prior Culture:  BCx 9/2 MRSA 2 out of 4  BCx 9/4 negative  BCx 9/24 negative    Antimicrobials:  Vancomycin 1000mg x1 (10/18)  cefepime  Injectable. 1000 every 24 hours (10/18 --- )    Impression:   #GPC Bacteremia, concerning for Breakthrough MRSA Bacteremia  #Recent MRSA Endocarditis   #Acute Hypoxic Respiratory Failure  #Hypotension, Lethargy  #ESRD on HD  - remains afebrile, limited ROS, AAOx1 currently, unable to provide further history  - No other information on chart aside from enddate 10/18/2024 of Vanco for endocarditis, though EMR does show that MRSA BCx positive 9/2 with subsequent clearance 9/5 and 9/24  - now with BCx 10/18 GPC, pending identification    Recommendations:  - start Daptomycin 550mg q24 (renally dosed)  - continue Cefepime 1G q24 (renally dosed)  - monitor temperature curve  - trend WBC  - side effects of antibiotic discussed, tolerating abx well so far  - follow up GPC identification 10/18, if MRSA, repeat BCx x2 (10/21)  - obtain further collateral info from [facility] if possible  --- if not possible, may need to repeat work up here including MRI spine, CRIS    Clinical team may change from intravenous to oral antibiotics when the following criteria are met:   1. Patient is clinically improving/stable       a)	Improved signs and symptoms of infection from initial presentation       b)	Afebrile for 24 hours       c)	Leukocytosis trending towards normal range   2. Patient is tolerating oral intake   3. Initial/repeat blood cultures are negative OR do not need to wait for preliminary blood cultures to result    Cannot advise changing to oral antibiotic therapy until culture sensitivity is available.   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), ESRD on HD M/W/F, R IJ VTE, CVA, HTN, recent discharge [facility] for MRSA Endocarditis on Vancomycin via PICC line (last day 10/18) presents from [facility] 10/18 for hypoxia, hypotension and lethargy  Afebrile on admission, on NRB > 4L NC, Hypotensive on pressors  WBC 12  Cr 6  UA negative, RVP negative  CTA without PE, R subclavian artery stent thrombosis  CTAP grossly negative for infectious etiology  TTE without obvious vegetation   BCx 10/18 GPC    Prior Culture:  BCx 9/2 MRSA 2 out of 4  BCx 9/4 negative  BCx 9/24 negative    Antimicrobials:  Vancomycin 1000mg x1 (10/18)  cefepime  Injectable. 1000 every 24 hours (10/18 --- )    Impression:   #GPC Bacteremia, concerning for Breakthrough MRSA Bacteremia  #Recent MRSA Endocarditis   #Acute Hypoxic Respiratory Failure  #Hypotension, Lethargy  #ESRD on HD  #Sacral Ulcer  - remains afebrile, limited ROS, AAOx1 currently, unable to provide further history  - No other information on chart aside from enddate 10/18/2024 of Vanco for endocarditis, though EMR does show that MRSA BCx positive 9/2 with subsequent clearance 9/5 and 9/24  - now with BCx 10/18 [facility], pending identification    Recommendations:  - start Daptomycin 550mg q24 (renally dosed)  - continue Cefepime 1G q24 (renally dosed)  - monitor temperature curve  - trend WBC  - side effects of antibiotic discussed, tolerating abx well so far  - remove PICC line  - Wound care   - follow up GPC identification 10/18, if MRSA, repeat BCx x2 (10/21)  - obtain further collateral info from [facility] if possible  --- if not possible, may need to repeat work up here including MRI spine, CRIS    Clinical team may change from intravenous to oral antibiotics when the following criteria are met:   1. Patient is clinically improving/stable       a)	Improved signs and symptoms of infection from initial presentation       b)	Afebrile for 24 hours       c)	Leukocytosis trending towards normal range   2. Patient is tolerating oral intake   3. Initial/repeat blood cultures are negative OR do not need to wait for preliminary blood cultures to result    Cannot advise changing to oral antibiotic therapy until culture sensitivity is available.   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), ESRD on HD M/W/F, R IJ VTE, CVA, HTN, recent discharge [facility] for MRSA Endocarditis on Vancomycin via PICC line (last day 10/18) presents from [facility] 10/18 for hypoxia, hypotension and lethargy  Afebrile on admission, on NRB > 4L NC, Hypotensive on pressors  WBC 12  Cr 6  UA negative, RVP negative  CTA without PE, R subclavian artery stent thrombosis  CTAP grossly negative for infectious etiology  TTE without obvious vegetation   BCx 10/18 GPC, CoNS 1 out of 2 so far    Prior Culture:  BCx 9/2 MRSA 2 out of 4  BCx 9/4 negative  BCx 9/24 negative    Antimicrobials:  Vancomycin 1000mg x1 (10/18) Dapto (10/19 --- )  cefepime  Injectable. 1000 every 24 hours (10/18 --- )    Impression:   #Acute Hypoxic Respiratory Failure  #Hypotension, Lethargy  #CoNS in Blood culture  #Recent MRSA Endocarditis   #ESRD on HD  #Sacral Ulcer  - remains afebrile, limited ROS, AAOx1 currently, unable to provide further history  - No other information on chart aside from enddate 10/18/2024 of Vanco for endocarditis, though EMR does show that MRSA BCx positive 9/2 with subsequent clearance 9/5 and 9/24  - now with BCx 10/18 G[facility], CoNS, likely contaminant but will wait for repeat BCx    Recommendations:  - start Daptomycin 550mg q24 (renally dosed)  - continue Cefepime 1G q24 (renally dosed)  - monitor temperature curve  - trend WBC  - side effects of antibiotic discussed, tolerating abx well so far  - remove PICC line  - Wound care   - follow up GPC identification 10/18  - repeat BCx 10/20  - obtain further collateral info from [facility] if possible    Clinical team may change from intravenous to oral antibiotics when the following criteria are met:   1. Patient is clinically improving/stable       a)	Improved signs and symptoms of infection from initial presentation       b)	Afebrile for 24 hours       c)	Leukocytosis trending towards normal range   2. Patient is tolerating oral intake   3. Initial/repeat blood cultures are negative OR do not need to wait for preliminary blood cultures to result    Cannot advise changing to oral antibiotic therapy until culture sensitivity is available.   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 for MRSA Endocarditis on Vancomycin via PICC line (last day 10/18) presents from [facility] 10/18 for hypoxia, hypotension and lethargy  Afebrile on admission, on NRB > 4L NC, Hypotensive on pressors  WBC 12  Cr 6  UA negative, RVP negative  CTA without PE, R subclavian artery stent thrombosis  CTAP grossly negative for infectious etiology  TTE without obvious vegetation   BCx 10/18 G[facility], CoNS 1 out of 4 bottles    Prior Culture:  BCx 9/2 MRSA 2 out of 4  BCx 9/4 negative  BCx 9/24 negative    Antimicrobials:  Vancomycin 1000mg x1 (10/18) Dapto (10/19 --- )  cefepime  Injectable. 1000 every 24 hours (10/18 --- )    Impression:   #Acute Hypoxic Respiratory Failure  #Hypotension, Lethargy  #CoNS in Blood culture  #Recent MRSA Endocarditis   #ESRD on HD  #Sacral Ulcer  - remains afebrile, limited ROS, AAOx1 currently, unable to provide further history  - No other information on chart aside from enddate 10/18/2024 of Vanco for endocarditis, though EMR does show that MRSA BCx positive 9/2 with subsequent clearance 9/5 and 9/24  - now with BCx 10/18 [facility], CoNS 1 out of 4 bottles, likely contaminant but will wait for repeat BCx    Recommendations:  - start Daptomycin 550mg q24 (renally dosed)  - continue Cefepime 1G q24 (renally dosed)  - monitor temperature curve  - trend WBC  - side effects of antibiotic discussed, tolerating abx well so far  - remove PICC line, since already completed Endocarditis course  - Wound care   - repeat BCx 10/20  - obtain further collateral info from [facility] if possible    Clinical team may change from intravenous to oral antibiotics when the following criteria are met:   1. Patient is clinically improving/stable       a)	Improved signs and symptoms of infection from initial presentation       b)	Afebrile for 24 hours       c)	Leukocytosis trending towards normal range   2. Patient is tolerating oral intake   3. Initial/repeat blood cultures are negative OR do not need to wait for preliminary blood cultures to result    Cannot advise changing to oral antibiotic therapy until culture sensitivity is available.

## 2024-10-19 NOTE — DIETITIAN INITIAL EVALUATION ADULT - NS FNS DIET ORDER
Diet, NPO:   Except Medications     Special Instructions for Nursing:  Except Medications (10-18-24 @ 10:57)

## 2024-10-20 LAB
ALBUMIN SERPL ELPH-MCNC: 2.1 G/DL — LOW (ref 3.3–5)
ALP SERPL-CCNC: 101 U/L — SIGNIFICANT CHANGE UP (ref 40–120)
ALT FLD-CCNC: 35 U/L — SIGNIFICANT CHANGE UP (ref 12–78)
ANION GAP SERPL CALC-SCNC: 10 MMOL/L — SIGNIFICANT CHANGE UP (ref 5–17)
APTT BLD: 136.8 SEC — CRITICAL HIGH (ref 24.5–35.6)
APTT BLD: 57.4 SEC — HIGH (ref 24.5–35.6)
APTT BLD: 68.4 SEC — HIGH (ref 24.5–35.6)
AST SERPL-CCNC: 32 U/L — SIGNIFICANT CHANGE UP (ref 15–37)
BASOPHILS # BLD AUTO: 0.05 K/UL — SIGNIFICANT CHANGE UP (ref 0–0.2)
BASOPHILS NFR BLD AUTO: 0.5 % — SIGNIFICANT CHANGE UP (ref 0–2)
BILIRUB SERPL-MCNC: 0.8 MG/DL — SIGNIFICANT CHANGE UP (ref 0.2–1.2)
BUN SERPL-MCNC: 25 MG/DL — HIGH (ref 7–23)
CALCIUM SERPL-MCNC: 11.6 MG/DL — HIGH (ref 8.5–10.1)
CALCIUM SERPL-MCNC: 9.3 MG/DL — SIGNIFICANT CHANGE UP (ref 8.4–10.5)
CHLORIDE SERPL-SCNC: 102 MMOL/L — SIGNIFICANT CHANGE UP (ref 96–108)
CO2 SERPL-SCNC: 26 MMOL/L — SIGNIFICANT CHANGE UP (ref 22–31)
CREAT SERPL-MCNC: 4.26 MG/DL — HIGH (ref 0.5–1.3)
CULTURE RESULTS: ABNORMAL
EGFR: 12 ML/MIN/1.73M2 — LOW
EOSINOPHIL # BLD AUTO: 0.02 K/UL — SIGNIFICANT CHANGE UP (ref 0–0.5)
EOSINOPHIL NFR BLD AUTO: 0.2 % — SIGNIFICANT CHANGE UP (ref 0–6)
GLUCOSE SERPL-MCNC: 87 MG/DL — SIGNIFICANT CHANGE UP (ref 70–99)
HCT VFR BLD CALC: 17.2 % — CRITICAL LOW (ref 34.5–45)
HCT VFR BLD CALC: 23 % — LOW (ref 34.5–45)
HCT VFR BLD CALC: 25 % — LOW (ref 34.5–45)
HCT VFR BLD CALC: 26 % — LOW (ref 34.5–45)
HGB BLD-MCNC: 4.8 G/DL — CRITICAL LOW (ref 11.5–15.5)
HGB BLD-MCNC: 7.1 G/DL — LOW (ref 11.5–15.5)
HGB BLD-MCNC: 7.6 G/DL — LOW (ref 11.5–15.5)
HGB BLD-MCNC: 7.9 G/DL — LOW (ref 11.5–15.5)
IMM GRANULOCYTES NFR BLD AUTO: 0.7 % — SIGNIFICANT CHANGE UP (ref 0–0.9)
LYMPHOCYTES # BLD AUTO: 1.42 K/UL — SIGNIFICANT CHANGE UP (ref 1–3.3)
LYMPHOCYTES # BLD AUTO: 13.5 % — SIGNIFICANT CHANGE UP (ref 13–44)
MAGNESIUM SERPL-MCNC: 1.7 MG/DL — SIGNIFICANT CHANGE UP (ref 1.6–2.6)
MCHC RBC-ENTMCNC: 27.9 GM/DL — LOW (ref 32–36)
MCHC RBC-ENTMCNC: 30.4 GM/DL — LOW (ref 32–36)
MCHC RBC-ENTMCNC: 30.4 GM/DL — LOW (ref 32–36)
MCHC RBC-ENTMCNC: 30.9 GM/DL — LOW (ref 32–36)
MCHC RBC-ENTMCNC: 31 PG — SIGNIFICANT CHANGE UP (ref 27–34)
MCHC RBC-ENTMCNC: 31.3 PG — SIGNIFICANT CHANGE UP (ref 27–34)
MCHC RBC-ENTMCNC: 31.3 PG — SIGNIFICANT CHANGE UP (ref 27–34)
MCHC RBC-ENTMCNC: 31.6 PG — SIGNIFICANT CHANGE UP (ref 27–34)
MCV RBC AUTO: 101.3 FL — HIGH (ref 80–100)
MCV RBC AUTO: 102 FL — HIGH (ref 80–100)
MCV RBC AUTO: 103.2 FL — HIGH (ref 80–100)
MCV RBC AUTO: 113.2 FL — HIGH (ref 80–100)
MONOCYTES # BLD AUTO: 0.81 K/UL — SIGNIFICANT CHANGE UP (ref 0–0.9)
MONOCYTES NFR BLD AUTO: 7.7 % — SIGNIFICANT CHANGE UP (ref 2–14)
NEUTROPHILS # BLD AUTO: 8.11 K/UL — HIGH (ref 1.8–7.4)
NEUTROPHILS NFR BLD AUTO: 77.4 % — HIGH (ref 43–77)
OB PNL STL: POSITIVE
ORGANISM # SPEC MICROSCOPIC CNT: ABNORMAL
ORGANISM # SPEC MICROSCOPIC CNT: SIGNIFICANT CHANGE UP
PHOSPHATE SERPL-MCNC: 3.1 MG/DL — SIGNIFICANT CHANGE UP (ref 2.5–4.5)
PLATELET # BLD AUTO: 154 K/UL — SIGNIFICANT CHANGE UP (ref 150–400)
PLATELET # BLD AUTO: 162 K/UL — SIGNIFICANT CHANGE UP (ref 150–400)
PLATELET # BLD AUTO: 163 K/UL — SIGNIFICANT CHANGE UP (ref 150–400)
PLATELET # BLD AUTO: 97 K/UL — LOW (ref 150–400)
POTASSIUM SERPL-MCNC: 3.5 MMOL/L — SIGNIFICANT CHANGE UP (ref 3.5–5.3)
POTASSIUM SERPL-SCNC: 3.5 MMOL/L — SIGNIFICANT CHANGE UP (ref 3.5–5.3)
PROT SERPL-MCNC: 7.6 GM/DL — SIGNIFICANT CHANGE UP (ref 6–8.3)
RBC # BLD: 1.52 M/UL — LOW (ref 3.8–5.2)
RBC # BLD: 2.27 M/UL — LOW (ref 3.8–5.2)
RBC # BLD: 2.45 M/UL — LOW (ref 3.8–5.2)
RBC # BLD: 2.52 M/UL — LOW (ref 3.8–5.2)
RBC # FLD: 19.8 % — HIGH (ref 10.3–14.5)
RBC # FLD: 19.8 % — HIGH (ref 10.3–14.5)
RBC # FLD: 20 % — HIGH (ref 10.3–14.5)
RBC # FLD: 20.5 % — HIGH (ref 10.3–14.5)
SODIUM SERPL-SCNC: 138 MMOL/L — SIGNIFICANT CHANGE UP (ref 135–145)
SPECIMEN SOURCE: SIGNIFICANT CHANGE UP
VANCOMYCIN TROUGH SERPL-MCNC: 24.1 UG/ML — HIGH (ref 10–20)
WBC # BLD: 10.48 K/UL — SIGNIFICANT CHANGE UP (ref 3.8–10.5)
WBC # BLD: 10.7 K/UL — HIGH (ref 3.8–10.5)
WBC # BLD: 11.86 K/UL — HIGH (ref 3.8–10.5)
WBC # BLD: 6.81 K/UL — SIGNIFICANT CHANGE UP (ref 3.8–10.5)
WBC # FLD AUTO: 10.48 K/UL — SIGNIFICANT CHANGE UP (ref 3.8–10.5)
WBC # FLD AUTO: 10.7 K/UL — HIGH (ref 3.8–10.5)
WBC # FLD AUTO: 11.86 K/UL — HIGH (ref 3.8–10.5)
WBC # FLD AUTO: 6.81 K/UL — SIGNIFICANT CHANGE UP (ref 3.8–10.5)

## 2024-10-20 PROCEDURE — 99222 1ST HOSP IP/OBS MODERATE 55: CPT

## 2024-10-20 PROCEDURE — 99232 SBSQ HOSP IP/OBS MODERATE 35: CPT

## 2024-10-20 PROCEDURE — 99291 CRITICAL CARE FIRST HOUR: CPT

## 2024-10-20 RX ORDER — HALOPERIDOL DECANOATE 50 MG/ML
5 INJECTION INTRAMUSCULAR EVERY 6 HOURS
Refills: 0 | Status: DISCONTINUED | OUTPATIENT
Start: 2024-10-20 | End: 2024-10-20

## 2024-10-20 RX ORDER — PANTOPRAZOLE SODIUM 40 MG/1
40 TABLET, DELAYED RELEASE ORAL EVERY 12 HOURS
Refills: 0 | Status: DISCONTINUED | OUTPATIENT
Start: 2024-10-20 | End: 2024-10-24

## 2024-10-20 RX ORDER — HEPARIN SODIUM 10000 [USP'U]/ML
5500 INJECTION INTRAVENOUS; SUBCUTANEOUS EVERY 6 HOURS
Refills: 0 | Status: DISCONTINUED | OUTPATIENT
Start: 2024-10-20 | End: 2024-10-29

## 2024-10-20 RX ORDER — LORAZEPAM 2 MG
1 TABLET ORAL EVERY 6 HOURS
Refills: 0 | Status: DISCONTINUED | OUTPATIENT
Start: 2024-10-20 | End: 2024-10-24

## 2024-10-20 RX ORDER — HEPARIN SODIUM 10000 [USP'U]/ML
2500 INJECTION INTRAVENOUS; SUBCUTANEOUS EVERY 6 HOURS
Refills: 0 | Status: DISCONTINUED | OUTPATIENT
Start: 2024-10-20 | End: 2024-10-29

## 2024-10-20 RX ORDER — ACETAMINOPHEN 500 MG
1000 TABLET ORAL ONCE
Refills: 0 | Status: COMPLETED | OUTPATIENT
Start: 2024-10-20 | End: 2024-10-20

## 2024-10-20 RX ORDER — HYDROMORPHONE HCL/0.9% NACL/PF 6 MG/30 ML
1 PATIENT CONTROLLED ANALGESIA SYRINGE INTRAVENOUS ONCE
Refills: 0 | Status: DISCONTINUED | OUTPATIENT
Start: 2024-10-20 | End: 2024-10-20

## 2024-10-20 RX ORDER — LACOSAMIDE 100 MG/1
150 TABLET ORAL EVERY 12 HOURS
Refills: 0 | Status: DISCONTINUED | OUTPATIENT
Start: 2024-10-20 | End: 2024-10-21

## 2024-10-20 RX ORDER — LACOSAMIDE 100 MG/1
200 TABLET ORAL EVERY 12 HOURS
Refills: 0 | Status: DISCONTINUED | OUTPATIENT
Start: 2024-10-20 | End: 2024-10-20

## 2024-10-20 RX ORDER — BUSPIRONE HYDROCHLORIDE 15 MG/1
10 TABLET ORAL
Refills: 0 | Status: DISCONTINUED | OUTPATIENT
Start: 2024-10-20 | End: 2024-10-29

## 2024-10-20 RX ORDER — HEPARIN SODIUM 10000 [USP'U]/ML
900 INJECTION INTRAVENOUS; SUBCUTANEOUS
Qty: 25000 | Refills: 0 | Status: DISCONTINUED | OUTPATIENT
Start: 2024-10-20 | End: 2024-10-29

## 2024-10-20 RX ORDER — COLLAGENASE CLOSTRIDIUM HIST. 250 UNIT/G
1 OINTMENT (GRAM) TOPICAL DAILY
Refills: 0 | Status: DISCONTINUED | OUTPATIENT
Start: 2024-10-20 | End: 2024-10-22

## 2024-10-20 RX ORDER — ESCITALOPRAM 10 MG/1
20 TABLET, FILM COATED ORAL DAILY
Refills: 0 | Status: DISCONTINUED | OUTPATIENT
Start: 2024-10-20 | End: 2024-10-29

## 2024-10-20 RX ORDER — HALOPERIDOL DECANOATE 50 MG/ML
5 INJECTION INTRAMUSCULAR EVERY 6 HOURS
Refills: 0 | Status: DISCONTINUED | OUTPATIENT
Start: 2024-10-20 | End: 2024-10-29

## 2024-10-20 RX ORDER — HEPARIN SODIUM 10000 [USP'U]/ML
5500 INJECTION INTRAVENOUS; SUBCUTANEOUS ONCE
Refills: 0 | Status: DISCONTINUED | OUTPATIENT
Start: 2024-10-20 | End: 2024-10-20

## 2024-10-20 RX ADMIN — PANTOPRAZOLE SODIUM 40 MILLIGRAM(S): 40 TABLET, DELAYED RELEASE ORAL at 11:32

## 2024-10-20 RX ADMIN — NOREPINEPHRINE BITARTRATE 6.33 MICROGRAM(S)/KG/MIN: 1 INJECTION, SOLUTION, CONCENTRATE INTRAVENOUS at 06:26

## 2024-10-20 RX ADMIN — Medication 1 APPLICATION(S): at 18:56

## 2024-10-20 RX ADMIN — HEPARIN SODIUM 900 UNIT(S)/HR: 10000 INJECTION INTRAVENOUS; SUBCUTANEOUS at 06:25

## 2024-10-20 RX ADMIN — CEFEPIME 1000 MILLIGRAM(S): 2 INJECTION, POWDER, FOR SOLUTION INTRAVENOUS at 18:37

## 2024-10-20 RX ADMIN — HEPARIN SODIUM 900 UNIT(S)/HR: 10000 INJECTION INTRAVENOUS; SUBCUTANEOUS at 07:08

## 2024-10-20 RX ADMIN — LACOSAMIDE 100 MILLIGRAM(S): 100 TABLET ORAL at 22:22

## 2024-10-20 RX ADMIN — ESCITALOPRAM 20 MILLIGRAM(S): 10 TABLET, FILM COATED ORAL at 11:32

## 2024-10-20 RX ADMIN — BUSPIRONE HYDROCHLORIDE 10 MILLIGRAM(S): 15 TABLET ORAL at 21:43

## 2024-10-20 RX ADMIN — LACOSAMIDE 100 MILLIGRAM(S): 100 TABLET ORAL at 00:10

## 2024-10-20 RX ADMIN — HALOPERIDOL DECANOATE 5 MILLIGRAM(S): 50 INJECTION INTRAMUSCULAR at 11:32

## 2024-10-20 RX ADMIN — PANTOPRAZOLE SODIUM 40 MILLIGRAM(S): 40 TABLET, DELAYED RELEASE ORAL at 21:43

## 2024-10-20 RX ADMIN — Medication 1 MILLIGRAM(S): at 09:23

## 2024-10-20 RX ADMIN — HEPARIN SODIUM 900 UNIT(S)/HR: 10000 INJECTION INTRAVENOUS; SUBCUTANEOUS at 15:29

## 2024-10-20 RX ADMIN — Medication 400 MILLIGRAM(S): at 06:26

## 2024-10-20 RX ADMIN — CHLORHEXIDINE GLUCONATE 1 APPLICATION(S): 40 SOLUTION TOPICAL at 06:53

## 2024-10-20 RX ADMIN — HEPARIN SODIUM 900 UNIT(S)/HR: 10000 INJECTION INTRAVENOUS; SUBCUTANEOUS at 06:57

## 2024-10-20 RX ADMIN — Medication 1 MILLIGRAM(S): at 18:37

## 2024-10-20 RX ADMIN — HEPARIN SODIUM 900 UNIT(S)/HR: 10000 INJECTION INTRAVENOUS; SUBCUTANEOUS at 21:47

## 2024-10-20 RX ADMIN — HEPARIN SODIUM 0 UNIT(S)/HR: 10000 INJECTION INTRAVENOUS; SUBCUTANEOUS at 05:09

## 2024-10-20 RX ADMIN — LACOSAMIDE 100 MILLIGRAM(S): 100 TABLET ORAL at 11:33

## 2024-10-20 RX ADMIN — BUSPIRONE HYDROCHLORIDE 10 MILLIGRAM(S): 15 TABLET ORAL at 11:31

## 2024-10-20 RX ADMIN — Medication 1 MILLIGRAM(S): at 09:40

## 2024-10-20 RX ADMIN — HEPARIN SODIUM 900 UNIT(S)/HR: 10000 INJECTION INTRAVENOUS; SUBCUTANEOUS at 19:09

## 2024-10-20 NOTE — SWALLOW BEDSIDE ASSESSMENT ADULT - SWALLOW EVAL: RECOMMENDED DIET
maintain soft and bite size: thin liquids, use small lumen straw. use small spoons.  meds crushed in pudding/puree , Pt is a challenging feed 2/2 poor ability to follow directions and bod habitus of side lying

## 2024-10-20 NOTE — PROGRESS NOTE ADULT - ASSESSMENT
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), ESRD on HD M/W/F, R IJ VTE, CVA, HTN, recent discharge [facility] for MRSA Endocarditis on Vancomycin via PICC line (last day 10/18) presents from [facility] 10/18 for hypoxia, hypotension and lethargy  Afebrile on admission, on NRB > 4L NC, Hypotensive on pressors  WBC 12  Cr 6  UA negative, RVP negative  CTA without PE, R subclavian artery stent thrombosis  CTAP grossly negative for infectious etiology  TTE without obvious vegetation   BCx 10/18 GPC, CoNS 2 out of 4 bottles    Prior Culture:  BCx 9/2 MRSA 2 out of 4  BCx 9/4 negative  BCx 9/24 negative    Antimicrobials:  Vancomycin 1000mg x1 (10/18) Dapto (10/19 --- )  cefepime  Injectable. 1000 every 24 hours (10/18 --- )    Impression:   #Acute Hypoxic Respiratory Failure  #Hypotension, Lethargy  #CoNS in Blood culture  #Recent MRSA Endocarditis   #ESRD on HD  #Sacral Ulcer  - remains afebrile, limited ROS, AAOx1 currently, unable to provide further history  - No other information on chart aside from enddate 10/18/2024 of Vanco for endocarditis, though EMR does show that MRSA BCx positive 9/2 with subsequent clearance 9/5 and 9/24  - now with BCx 10/18 GPC, CoNS 2 out of 4 bottles, likely contaminant but will wait for repeat BCx  - remains afebrile, no leukocytosis, so far no obvious source of infection    Recommendations:  - continue empiric Daptomycin 550mg q24 (renally dosed)  - continue empiric Cefepime 1G q24 (renally dosed)  - monitor temperature curve  - trend WBC  - side effects of antibiotic discussed, tolerating abx well so far  - Wound care   - follow up repeat BCx 10/20  - obtain further collateral info from [facility] if possible    Clinical team may change from intravenous to oral antibiotics when the following criteria are met:   1. Patient is clinically improving/stable       a)	Improved signs and symptoms of infection from initial presentation       b)	Afebrile for 24 hours       c)	Leukocytosis trending towards normal range   2. Patient is tolerating oral intake   3. Initial/repeat blood cultures are negative OR do not need to wait for preliminary blood cultures to result    Cannot advise changing to oral antibiotic therapy until culture sensitivity is available. 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), ESRD on HD M/W/F, R IJ VTE, CVA, HTN, recent discharge [facility] for MRSA Endocarditis on Vancomycin via PICC line (last day 10/18) presents from [facility] 10/18 for hypoxia, hypotension and lethargy  Afebrile on admission, on NRB > 4L NC, Hypotensive on pressors  WBC 12  Cr 6  UA negative, RVP negative  CTA without PE, R subclavian artery stent thrombosis  CTAP grossly negative for infectious etiology  TTE without obvious vegetation   BCx 10/18 GPC, CoNS 2 out of 4 bottles    Prior Culture:  BCx 9/2 MRSA 2 out of 4  BCx 9/4 negative  BCx 9/24 negative    Antimicrobials:  Vancomycin 1000mg x1 (10/18) Dapto (10/19 --- )  cefepime  Injectable. 1000 every 24 hours (10/18 --- )    Impression:   #Acute Hypoxic Respiratory Failure  #Hypotension, Lethargy  #CoNS in Blood culture  #Recent MRSA Endocarditis   #ESRD on HD  #Sacral Ulcer  - remains afebrile, limited ROS, AAOx1 currently, unable to provide further history  - No other information on chart aside from enddate 10/18/2024 of Vanco for endocarditis, though EMR does show that MRSA BCx positive 9/2 with subsequent clearance 9/5 and 9/24  - now with BCx 10/18 GPC, CoNS 2 out of 4 bottles, likely contaminant but will wait for repeat BCx  - remains afebrile, no leukocytosis, so far no obvious source of infection    Recommendations:  - continue empiric Daptomycin 550mg q24 (renally dosed)  - continue empiric Cefepime 1G q24 (renally dosed)  - monitor temperature curve  - trend WBC  - side effects of antibiotic discussed, tolerating abx well so far  - Wound care   - follow up repeat BCx 10/20  - obtain further collateral info from [facility] if possible    Clinical team may change from intravenous to oral antibiotics when the following criteria are met:   1. Patient is clinically improving/stable       a)	Improved signs and symptoms of infection from initial presentation       b)	Afebrile for 24 hours       c)	Leukocytosis trending towards normal range   2. Patient is tolerating oral intake   3. Initial/repeat blood cultures are negative OR do not need to wait for preliminary blood cultures to result    Cannot advise changing to oral antibiotic therapy until culture sensitivity is available. 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), ESRD on HD M/W/F, R IJ VTE, CVA, HTN, recent discharge [facility] for MRSA Endocarditis on Vancomycin via PICC line (last day 10/18) presents from [facility] 10/18 for hypoxia, hypotension and lethargy  Afebrile on admission, on NRB > 4L NC, Hypotensive on pressors  WBC 12  Cr 6  UA negative, RVP negative  CTA without PE, R subclavian artery stent thrombosis  CTAP grossly negative for infectious etiology  TTE without obvious vegetation   BCx 10/18 GPC, CoNS 2 out of 4 bottles    Prior Culture:  BCx 9/2 MRSA 2 out of 4  BCx 9/4 negative  BCx 9/24 negative    Antimicrobials:  Vancomycin 1000mg x1 (10/18) Dapto (10/19 --- )  cefepime  Injectable. 1000 every 24 hours (10/18 --- )    Impression:   #Acute Hypoxic Respiratory Failure  #Hypotension, Lethargy  #CoNS in Blood culture  #Recent MRSA Endocarditis   #ESRD on HD  #Sacral Ulcer  - remains afebrile, limited ROS, AAOx1 currently, unable to provide further history  - No other information on chart aside from enddate 10/18/2024 of Vanco for endocarditis, though EMR does show that MRSA BCx positive 9/2 with subsequent clearance 9/5 and 9/24  - now with BCx 10/18 GPC, CoNS 2 out of 4 bottles, likely contaminant but will wait for repeat BCx  - remains afebrile, no leukocytosis, so far no obvious source of infection    Recommendations:  - continue empiric Daptomycin 550mg q24 (renally dosed)  - continue empiric Cefepime 1G q24 (renally dosed)  - monitor temperature curve  - trend WBC  - side effects of antibiotic discussed, tolerating abx well so far  - remove PICC line, since already completed Endocarditis course  - Wound care   - follow up repeat BCx 10/20  - obtain further collateral info from [facility] if possible    Clinical team may change from intravenous to oral antibiotics when the following criteria are met:   1. Patient is clinically improving/stable       a)	Improved signs and symptoms of infection from initial presentation       b)	Afebrile for 24 hours       c)	Leukocytosis trending towards normal range   2. Patient is tolerating oral intake   3. Initial/repeat blood cultures are negative OR do not need to wait for preliminary blood cultures to result    Cannot advise changing to oral antibiotic therapy until culture sensitivity is available.

## 2024-10-20 NOTE — PROGRESS NOTE ADULT - SUBJECTIVE AND OBJECTIVE BOX
NEPHROLOGY INTERVAL HPI/OVERNIGHT EVENTS:  10-20-24 @ 13:04      10/20 + melena for CT with ivc   10/19 seen at HD, alert awake   HPI:    S:  Pt seen and examined  54-year-old patient with past medical history for antiphospholipid syndrome, end-stage renal disease on dialysis , acute embolism and thrombosis of the right internal jugular vein, high blood pressure, stroke, endocarditis, high cholesterol, presents emergency department for hypoxia, hypotension, less responsiveness.  Patient is awake,  soft  smoking and  answers few questions.  Patient currently with no pain  .  [facility] called and informed that patient was found this morning satting 60%, blood pressure 87/55 and low temperature of 97.2.  Patient was sent to the ER for further evaluation.  Patient is a DNR, but   Not DNI  ICU called for consult  10/18: Hypotensive despite 1L IVF; add'l ordered, ESRD noted. Pressors ordered. Workup in progress.  (18 Oct 2024 11:35)  Patient is a 54y old  Female who presents with a chief complaint of Shock (18 Oct 2024 11:35)  ==============================================================================  NEPHROLOGY CONSULT  hx from chart and reivew of transfer document   esrd at [facility] on daily hd m-f   on MRSA endocard tx on vanc 500 mg end date 10/18 via left PICC line   admitted for shock with hypoxia   s/p CTA and a/p await results  on levofed   abg pending, sa02 not recordable   lethargic   has hx of APL on coumadin, with SLE on plaquenil   AVF left arm with + thrill and bruit  CCM input noted, unable to identify HCP       PAST MEDICAL & SURGICAL HISTORY:  - esrd mtwthf [facility]   - MRSA endocarditis on vanc until 10/18   - APL on coumadin   - CVA with dysphagia   ESRD on dialysis      MEDICATIONS  (STANDING):  busPIRone 10 milliGRAM(s) Oral two times a day  cefepime  Injectable. 1000 milliGRAM(s) IV Push every 24 hours  chlorhexidine 2% Cloths 1 Application(s) Topical <User Schedule>  collagenase Ointment 1 Application(s) Topical daily  DAPTOmycin IVPB 550 milliGRAM(s) IV Intermittent every 48 hours  escitalopram 20 milliGRAM(s) Oral daily  heparin  Infusion.  Unit(s)/Hr (12 mL/Hr) IV Continuous <Continuous>  lacosamide IVPB 150 milliGRAM(s) IV Intermittent every 12 hours  norepinephrine Infusion 0.05 MICROgram(s)/kG/Min (6.33 mL/Hr) IV Continuous <Continuous>  pantoprazole  Injectable 40 milliGRAM(s) IV Push every 12 hours    MEDICATIONS  (PRN):  haloperidol    Injectable 5 milliGRAM(s) IV Push every 6 hours PRN agitation  heparin   Injectable 5500 Unit(s) IV Push every 6 hours PRN For aPTT less than 40  heparin   Injectable 2500 Unit(s) IV Push every 6 hours PRN For aPTT between 40 - 57  LORazepam   Injectable 1 milliGRAM(s) IV Push every 6 hours PRN Agitation      Allergies    No Known Allergies    Intolerances        I&O's Detail    19 Oct 2024 07:01  -  20 Oct 2024 07:00  --------------------------------------------------------  IN:    Heparin Infusion: 132 mL    IV PiggyBack: 200 mL    Norepinephrine: 110 mL  Total IN: 442 mL    OUT:    Other (mL): 1500 mL  Total OUT: 1500 mL    Total NET: -1058 mL            Vital Signs Last 24 Hrs  T(C): 36.6 (20 Oct 2024 06:20), Max: 36.9 (20 Oct 2024 00:26)  T(F): 97.9 (20 Oct 2024 06:20), Max: 98.5 (20 Oct 2024 00:26)  HR: 119 (20 Oct 2024 06:00) (84 - 122)  BP: 122/39 (20 Oct 2024 04:00) (70/24 - 122/39)  BP(mean): 62 (20 Oct 2024 04:00) (31 - 103)  RR: 19 (20 Oct 2024 05:00) (10 - 28)  SpO2: 96% (20 Oct 2024 05:00) (81% - 100%)    Parameters below as of 19 Oct 2024 18:00  Patient On (Oxygen Delivery Method): room air      Daily     Daily Weight in k.4 (20 Oct 2024 06:20)    PHYSICAL EXAM:  General: alert. awake   HEENT: MMM  CV: s1s2 rrr  LUNGS: B/L CTA  EXT: no edema    LABS:                        7.6    10.48 )-----------( 163      ( 20 Oct 2024 06:47 )             25.0     10    138  |  102  |  25[H]  ----------------------------<  87  3.5   |  26  |  4.26[H]    Ca    11.6[H]      20 Oct 2024 06:47  Phos  3.1     10-20  Mg     1.7     10-20    TPro  7.6  /  Alb  2.1[L]  /  TBili  0.8  /  DBili  x   /  AST  32  /  ALT  35  /  AlkPhos  101  10-20    PT/INR - ( 19 Oct 2024 05:42 )   PT: 15.8 sec;   INR: 1.34 ratio         PTT - ( 20 Oct 2024 04:10 )  PTT:136.8 sec  Urinalysis Basic - ( 20 Oct 2024 06:47 )    Color: x / Appearance: x / SG: x / pH: x  Gluc: 87 mg/dL / Ketone: x  / Bili: x / Urobili: x   Blood: x / Protein: x / Nitrite: x   Leuk Esterase: x / RBC: x / WBC x   Sq Epi: x / Non Sq Epi: x / Bacteria: x      Magnesium: 1.7 mg/dL (10-20 @ 06:47)  Phosphorus: 3.1 mg/dL (10-20 @ 06:47)    ABG - ( 18 Oct 2024 13:59 )  pH, Arterial: 7.42  pH, Blood: x     /  pCO2: 47    /  pO2: 429   / HCO3: 30    / Base Excess: 5.1   /  SaO2: 100

## 2024-10-20 NOTE — PROGRESS NOTE ADULT - SUBJECTIVE AND OBJECTIVE BOX
Interval History:    MEDICATIONS  (STANDING):  cefepime  Injectable. 1000 milliGRAM(s) IV Push every 24 hours  chlorhexidine 2% Cloths 1 Application(s) Topical <User Schedule>  DAPTOmycin IVPB 550 milliGRAM(s) IV Intermittent every 48 hours  heparin  Infusion.  Unit(s)/Hr (12 mL/Hr) IV Continuous <Continuous>  lacosamide IVPB 150 milliGRAM(s) IV Intermittent every 12 hours  norepinephrine Infusion 0.05 MICROgram(s)/kG/Min (6.33 mL/Hr) IV Continuous <Continuous>  pantoprazole  Injectable 40 milliGRAM(s) IV Push daily    MEDICATIONS  (PRN):  heparin   Injectable 2500 Unit(s) IV Push every 6 hours PRN For aPTT between 40 - 57  heparin   Injectable 5500 Unit(s) IV Push every 6 hours PRN For aPTT less than 40      Allergies    No Known Allergies    Intolerances        PHYSICAL EXAM:  Vital Signs Last 24 Hrs  T(F): 97.9 (10-20-24 @ 06:20)  HR: 119 (10-20-24 @ 06:00)  BP: 122/39 (10-20-24 @ 04:00)  RR: 19 (10-20-24 @ 05:00)    GENERAL: NAD, well-groomed, well-developed  HEAD:  Atraumatic, Normocephalic  Neuro:  Awake, alert, no aphasia  CN: PERRL, EOMI, no nystagmus, no facial weakness, tongue protrudes in the midline  motor: normal tone, no pronator drift, full strength in all four extremiteis  sensory: intact to light touch  coordination: finger to nose intact bilaterally  DTRs: symmetric, plantar responses flexor bilaterally    LABS:                        7.6    10.48 )-----------( 163      ( 20 Oct 2024 06:47 )             25.0     10-20    138  |  102  |  25[H]  ----------------------------<  87  3.5   |  26  |  4.26[H]    Ca    11.6[H]      20 Oct 2024 06:47  Phos  3.1     10-20  Mg     1.7     10-20    TPro  7.6  /  Alb  2.1[L]  /  TBili  0.8  /  DBili  x   /  AST  32  /  ALT  35  /  AlkPhos  101  10-20    PT/INR - ( 19 Oct 2024 05:42 )   PT: 15.8 sec;   INR: 1.34 ratio         PTT - ( 20 Oct 2024 04:10 )  PTT:136.8 sec  Urinalysis Basic - ( 20 Oct 2024 06:47 )    Color: x / Appearance: x / SG: x / pH: x  Gluc: 87 mg/dL / Ketone: x  / Bili: x / Urobili: x   Blood: x / Protein: x / Nitrite: x   Leuk Esterase: x / RBC: x / WBC x   Sq Epi: x / Non Sq Epi: x / Bacteria: x        RADIOLOGY & ADDITIONAL STUDIES:    CT head 10/18/24:  No acute intracranial  hemorrhage or midline shift.  Right frontal temporal encephalomalacia/gliosis.  Extensive white matter small vessel ischemic disease/chronic infarct.  Chronic left thalamic infarct.  Generalized volume loss with ventricular dilatation.         Interval History:  Patient is agitated, yells intermittently.  Not able to offer complaints.    MEDICATIONS  (STANDING):  cefepime  Injectable. 1000 milliGRAM(s) IV Push every 24 hours  chlorhexidine 2% Cloths 1 Application(s) Topical <User Schedule>  DAPTOmycin IVPB 550 milliGRAM(s) IV Intermittent every 48 hours  heparin  Infusion.  Unit(s)/Hr (12 mL/Hr) IV Continuous <Continuous>  lacosamide IVPB 150 milliGRAM(s) IV Intermittent every 12 hours  norepinephrine Infusion 0.05 MICROgram(s)/kG/Min (6.33 mL/Hr) IV Continuous <Continuous>  pantoprazole  Injectable 40 milliGRAM(s) IV Push daily    MEDICATIONS  (PRN):  heparin   Injectable 2500 Unit(s) IV Push every 6 hours PRN For aPTT between 40 - 57  heparin   Injectable 5500 Unit(s) IV Push every 6 hours PRN For aPTT less than 40      Allergies    No Known Allergies    Intolerances        PHYSICAL EXAM:  Vital Signs Last 24 Hrs  T(F): 97.9 (10-20-24 @ 06:20)  HR: 119 (10-20-24 @ 06:00)  BP: 122/39 (10-20-24 @ 04:00)  RR: 19 (10-20-24 @ 05:00)      Gen exam:  Normocephalic, agitated  HEENT: PERRLA  Neck: Supple.  Respiratory: Breath sounds are clear bilaterally  Cardiovascular: S1 and S2, regular   Extremities:  no edema  Musculoskeletal: LE spastic  Skin: No rashes      Neurological exam:  HF: Awake and alert, says "yes" and "no". Agitated, intermittently screams.   CN: GAY, Gaze palsy to left, tracks eyes, no gross NLFD, tongue midline,   Motor: difficult to test, does not follow commands,  moves both extremities antigravity, lower extremity spastic but able to move within the bed.  Sensory:  withdraws all 4 extremities to noxious stimuli  Coordination: unable to assess  Reflexes: upper extremities 2+, lower extremities unable to assess  Gait: unable to assess          LABS:                        7.6    10.48 )-----------( 163      ( 20 Oct 2024 06:47 )             25.0     10-20    138  |  102  |  25[H]  ----------------------------<  87  3.5   |  26  |  4.26[H]    Ca    11.6[H]      20 Oct 2024 06:47  Phos  3.1     10-20  Mg     1.7     10-20    TPro  7.6  /  Alb  2.1[L]  /  TBili  0.8  /  DBili  x   /  AST  32  /  ALT  35  /  AlkPhos  101  10-20    PT/INR - ( 19 Oct 2024 05:42 )   PT: 15.8 sec;   INR: 1.34 ratio         PTT - ( 20 Oct 2024 04:10 )  PTT:136.8 sec  Urinalysis Basic - ( 20 Oct 2024 06:47 )    Color: x / Appearance: x / SG: x / pH: x  Gluc: 87 mg/dL / Ketone: x  / Bili: x / Urobili: x   Blood: x / Protein: x / Nitrite: x   Leuk Esterase: x / RBC: x / WBC x   Sq Epi: x / Non Sq Epi: x / Bacteria: x        RADIOLOGY & ADDITIONAL STUDIES:    CT head 10/18/24:  No acute intracranial  hemorrhage or midline shift.  Right frontal temporal encephalomalacia/gliosis.  Extensive white matter small vessel ischemic disease/chronic infarct.  Chronic left thalamic infarct.  Generalized volume loss with ventricular dilatation.

## 2024-10-20 NOTE — SWALLOW BEDSIDE ASSESSMENT ADULT - ORAL PHASE
immediate suction ojf liquid in multiple successive swallow:  immediate bous formation for puree and soft chewable. mastication normally rotary-lateral.  AP transfer WFL across textures consumed

## 2024-10-20 NOTE — CONSULT NOTE ADULT - SUBJECTIVE AND OBJECTIVE BOX
Patient is a 54y old  Female who presents with a chief complaint of Shock (20 Oct 2024 13:03)      HPI:  Ms. Workman is a 55 yo F with a history of antiphospholipid syndrome, ESRD (on HD M/W/F), right internal jugular vein thrombus, HTN, prior CVA, endocarditis, and HLD, with recent admission at [facility] for MRSA Endocarditis on vancomycin via PICC line (completed course of Abx on 10/18) who presented to the ED on 10/18/24 due to hypothermia, hypoxia, and hypotension at Rehab center. As per ICU H&P patient was found to have spO2 pf 60%, BP of 87/55 and temperature of 97.2.      On admission patient was found to have WBC 11.0, Hb 9.6, .6, Plts 153. Blood cultures have come back positive for gram + cocci in clusters. CT findings have revealed  - No evidence of PNA  - No acute intracranial  hemorrhage or midline shift.  - Right frontal temporal encephalomalacia/gliosis.  - Extensive white matter small vessel ischemic disease/chronic infarct.  - Chronic left thalamic infarct.  - Generalized volume loss with ventricular dilatation.  - No pulmonary embolism.  - Focal nonopacification of a portion of the right subclavian artery stent, suspicious for thrombus. Correlate with Doppler imaging.  - Enlarged myomatous uterus, with large pedunculated 14cm fibroid extending up to the left hemiabdomen.     GI consulted today due to concern for dark FOBT positive stools and downtrend of Hb from 9.6 on 10/18 now down to 7.9 today. She has not recieved any blood products while admitted.      PAST MEDICAL & SURGICAL HISTORY:  ESRD on dialysis      History of stroke          MEDICATIONS  (STANDING):  busPIRone 10 milliGRAM(s) Oral two times a day  cefepime  Injectable. 1000 milliGRAM(s) IV Push every 24 hours  chlorhexidine 2% Cloths 1 Application(s) Topical <User Schedule>  collagenase Ointment 1 Application(s) Topical daily  DAPTOmycin IVPB 550 milliGRAM(s) IV Intermittent every 48 hours  escitalopram 20 milliGRAM(s) Oral daily  heparin  Infusion.  Unit(s)/Hr (12 mL/Hr) IV Continuous <Continuous>  lacosamide IVPB 150 milliGRAM(s) IV Intermittent every 12 hours  norepinephrine Infusion 0.05 MICROgram(s)/kG/Min (6.33 mL/Hr) IV Continuous <Continuous>  pantoprazole  Injectable 40 milliGRAM(s) IV Push every 12 hours    MEDICATIONS  (PRN):  haloperidol    Injectable 5 milliGRAM(s) IV Push every 6 hours PRN agitation  heparin   Injectable 2500 Unit(s) IV Push every 6 hours PRN For aPTT between 40 - 57  heparin   Injectable 5500 Unit(s) IV Push every 6 hours PRN For aPTT less than 40  LORazepam   Injectable 1 milliGRAM(s) IV Push every 6 hours PRN Agitation      Allergies    No Known Allergies    Intolerances        SOCIAL HISTORY:    FAMILY HISTORY:      REVIEW OF SYSTEMS:    Unable to obtain ROS given patient's mental status  All other review of systems is negative unless indicated above.    Vital Signs Last 24 Hrs  T(C): 36.6 (20 Oct 2024 06:20), Max: 36.9 (20 Oct 2024 00:26)  T(F): 97.9 (20 Oct 2024 06:20), Max: 98.5 (20 Oct 2024 00:26)  HR: 119 (20 Oct 2024 06:00) (84 - 119)  BP: 122/39 (20 Oct 2024 04:00) (76/57 - 122/39)  BP(mean): 62 (20 Oct 2024 04:00) (40 - 103)  RR: 19 (20 Oct 2024 05:00) (13 - 23)  SpO2: 96% (20 Oct 2024 05:00) (81% - 100%)    Parameters below as of 19 Oct 2024 18:00  Patient On (Oxygen Delivery Method): room air        PHYSICAL EXAM:    Constitutional: No acute distress   Respiratory: clear to ascultation bilaterally, no wheezing  Cardiovascular: RRR  Gastrointestinal: soft, non-tender, non-distended, +bowel sounds   Extremities: No peripheral edema   Vascular: 2+ peripheral pulses   Neurological: A/O x 0  Psychiatric: unable to assess  Skin: No rashes, not jaundiced    LABS:                        7.9    10.70 )-----------( 162      ( 20 Oct 2024 12:51 )             26.0     10-20    138  |  102  |  25[H]  ----------------------------<  87  3.5   |  26  |  4.26[H]    Ca    11.6[H]      20 Oct 2024 06:47  Phos  3.1     10-20  Mg     1.7     10-20    TPro  7.6  /  Alb  2.1[L]  /  TBili  0.8  /  DBili  x   /  AST  32  /  ALT  35  /  AlkPhos  101  10-20    PT/INR - ( 19 Oct 2024 05:42 )   PT: 15.8 sec;   INR: 1.34 ratio         PTT - ( 20 Oct 2024 12:51 )  PTT:68.4 sec  LIVER FUNCTIONS - ( 20 Oct 2024 06:47 )  Alb: 2.1 g/dL / Pro: 7.6 gm/dL / ALK PHOS: 101 U/L / ALT: 35 U/L / AST: 32 U/L / GGT: x             RADIOLOGY & ADDITIONAL STUDIES: Patient is a 54y old  Female who presents with a chief complaint of Shock (20 Oct 2024 13:03)      HPI:  Ms. Workman is a 53 yo F with a history of antiphospholipid syndrome, ESRD (on HD M/W/F), right internal jugular vein thrombus, HTN, prior CVA, endocarditis, and HLD, with recent admission at [facility] for MRSA Endocarditis on vancomycin via PICC line (completed course of Abx on 10/18) who presented to the ED on 10/18/24 due to hypothermia, hypoxia, and hypotension at Rehab center. As per ICU H&P patient was found to have spO2 pf 60%, BP of 87/55 and temperature of 97.2.      On admission patient was found to have WBC 11.0, Hb 9.6, .6, Plts 153. Blood cultures have come back positive for gram + cocci in clusters. CT findings have revealed  - No evidence of PNA  - No acute intracranial  hemorrhage or midline shift.  - Right frontal temporal encephalomalacia/gliosis.  - Extensive white matter small vessel ischemic disease/chronic infarct.  - Chronic left thalamic infarct.  - Generalized volume loss with ventricular dilatation.  - No pulmonary embolism.  - Focal nonopacification of a portion of the right subclavian artery stent, suspicious for thrombus. Correlate with Doppler imaging.  - Enlarged myomatous uterus, with large pedunculated 14cm fibroid extending up to the left hemiabdomen.     GI consulted today due to concern for dark FOBT positive stools and downtrend of Hb from 9.6 on 10/18 now down to 7.9 today. She has not recieved any blood products while admitted.      PAST MEDICAL & SURGICAL HISTORY:  ESRD on dialysis      History of stroke          MEDICATIONS  (STANDING):  busPIRone 10 milliGRAM(s) Oral two times a day  cefepime  Injectable. 1000 milliGRAM(s) IV Push every 24 hours  chlorhexidine 2% Cloths 1 Application(s) Topical <User Schedule>  collagenase Ointment 1 Application(s) Topical daily  DAPTOmycin IVPB 550 milliGRAM(s) IV Intermittent every 48 hours  escitalopram 20 milliGRAM(s) Oral daily  heparin  Infusion.  Unit(s)/Hr (12 mL/Hr) IV Continuous <Continuous>  lacosamide IVPB 150 milliGRAM(s) IV Intermittent every 12 hours  norepinephrine Infusion 0.05 MICROgram(s)/kG/Min (6.33 mL/Hr) IV Continuous <Continuous>  pantoprazole  Injectable 40 milliGRAM(s) IV Push every 12 hours    MEDICATIONS  (PRN):  haloperidol    Injectable 5 milliGRAM(s) IV Push every 6 hours PRN agitation  heparin   Injectable 2500 Unit(s) IV Push every 6 hours PRN For aPTT between 40 - 57  heparin   Injectable 5500 Unit(s) IV Push every 6 hours PRN For aPTT less than 40  LORazepam   Injectable 1 milliGRAM(s) IV Push every 6 hours PRN Agitation      Allergies    No Known Allergies    Intolerances        SOCIAL HISTORY:    FAMILY HISTORY:      REVIEW OF SYSTEMS:    Unable to obtain ROS given patient's mental status  All other review of systems is negative unless indicated above.    Vital Signs Last 24 Hrs  T(C): 36.6 (20 Oct 2024 06:20), Max: 36.9 (20 Oct 2024 00:26)  T(F): 97.9 (20 Oct 2024 06:20), Max: 98.5 (20 Oct 2024 00:26)  HR: 119 (20 Oct 2024 06:00) (84 - 119)  BP: 122/39 (20 Oct 2024 04:00) (76/57 - 122/39)  BP(mean): 62 (20 Oct 2024 04:00) (40 - 103)  RR: 19 (20 Oct 2024 05:00) (13 - 23)  SpO2: 96% (20 Oct 2024 05:00) (81% - 100%)    Parameters below as of 19 Oct 2024 18:00  Patient On (Oxygen Delivery Method): room air        PHYSICAL EXAM:    Constitutional: No acute distress   Respiratory: clear to ascultation bilaterally, no wheezing  Cardiovascular: RRR  Gastrointestinal: soft, non-tender, non-distended, +bowel sounds   Extremities: No peripheral edema   Vascular: 2+ peripheral pulses   Neurological: A/O x 0  Psychiatric: unable to assess  Skin: No rashes, not jaundiced  Rectal: light brown stool smeared on bed, patient not cooperating for complete internal/external exam    LABS:                        7.9    10.70 )-----------( 162      ( 20 Oct 2024 12:51 )             26.0     10-20    138  |  102  |  25[H]  ----------------------------<  87  3.5   |  26  |  4.26[H]    Ca    11.6[H]      20 Oct 2024 06:47  Phos  3.1     10-20  Mg     1.7     10-20    TPro  7.6  /  Alb  2.1[L]  /  TBili  0.8  /  DBili  x   /  AST  32  /  ALT  35  /  AlkPhos  101  10-20    PT/INR - ( 19 Oct 2024 05:42 )   PT: 15.8 sec;   INR: 1.34 ratio         PTT - ( 20 Oct 2024 12:51 )  PTT:68.4 sec  LIVER FUNCTIONS - ( 20 Oct 2024 06:47 )  Alb: 2.1 g/dL / Pro: 7.6 gm/dL / ALK PHOS: 101 U/L / ALT: 35 U/L / AST: 32 U/L / GGT: x             RADIOLOGY & ADDITIONAL STUDIES:

## 2024-10-20 NOTE — SWALLOW BEDSIDE ASSESSMENT ADULT - COMMENTS
As per Neuro follow up note: "54-year-old female with PMHx  of APLS, HTN,  HLD, CVA with residual aphasia, ESRD on dialysis 3 / week, thrombosis/embolism right IJ, recently treated for MRSA endocarditis at [facility], on Vancomycin via PICC line (last day 10/18), presented to [facility] ED from [facility] with less responsiveness, hypotension and hypoxia.  Patient was admitted to CCU, followed by nephrology for ESRD - creatinine 6.11, treated with pressors and for shock. Neurology consulted to rule out seizure as patient was noted to have slight stiffening of the extremities on 10/18 per intensivist Dr. White. Patient is on lacosamide 150 mg bid. "    Service is consulted for po intake.

## 2024-10-20 NOTE — PROGRESS NOTE ADULT - ASSESSMENT
Ms. Workman is a 54-year-old female with PMHx  of APLS, HTN,  HLD, CVA with residual aphasia, ESRD on dialysis 3 / week, thrombosis/embolism right IJ, recently treated for MRSA endocarditis at [facility], on Vancomycin via PICC line (last day 10/18), presented to [facility] ED from [facility] with less responsiveness, hypotension and hypoxia.  Patient awas dmitted to CCU, followed by nephrology for ESRD - creatinine 6.11, treated with pressors and for shock. Neurology consulted to rule out seizure as patient was noted to have slight stiffening of the extremities on 10/18 per intensivist Dr. White. Patient is on lacosamide 150 mg bid.     Patient is on Plaquenil ? lupus versus connective tissue disorder, she is also on Coumadin, INR was subtherapeutic, 1. 43 -> 1.34, is on IV heparin infusion.  There is no documented history of seizures, we are unable to reach any family to get more info. On exam, patient is awake and alert, she seems to comprehend, she tries to articulate but there is no verbal output, she tries to formulate words with her lips, she barely follows some instructions but not commands.  She moves both upper extremities well, she withdraws both lower extremities legs seem spastic.      # Possible history of seizures, has been on lacosamide 150 Mg twice daily, seizure history unknown, possible breakthrough seizure      – Obtain routine EEG today, if abnormal then may consider 24-hour EEG monitoring  – Continue lacosamide 150 Mg twice daily, it is renally cleared, nephrologist made aware      # History of CVA with significant disabilities including aphasia, numerous other risk factors i.e. APLS, possible connective tissue disorder - INR subtherapeutic    -- Continue with IV heparin, bring INR > 2.5  – May obtain MRI of the brain with and without contrast once stable to rule out any inflammatory ischemic lesions      Above discussed with Dr. White and Dr. Josep Box is on service from 10/20 she will follow-up     Ms. Workman is a 54-year-old female with PMHx  of APLS, HTN,  HLD, CVA with residual aphasia, ESRD on dialysis 3 / week, thrombosis/embolism right IJ, recently treated for MRSA endocarditis at [facility], on Vancomycin via PICC line (last day 10/18), presented to [facility] ED from [facility] with less responsiveness, hypotension and hypoxia.  Patient awas dmitted to CCU, followed by nephrology for ESRD - creatinine 6.11, treated with pressors and for shock. Neurology consulted to rule out seizure as patient was noted to have slight stiffening of the extremities on 10/18 per intensivist Dr. Mejia. Patient is on lacosamide 150 mg bid.     Patient is on Plaquenil ? lupus versus connective tissue disorder, she is also on Coumadin, INR was subtherapeutic, 1. 43 -> 1.34, is on IV heparin infusion.  There is no documented history of seizures, we are unable to reach any family to get more info. On exam, patient is awake and alert, she seems to comprehend, she tries to articulate but there is no verbal output, she tries to formulate words with her lips, she barely follows some instructions but not commands.  She moves both upper extremities well, she withdraws both lower extremities legs seem spastic.      # Possible history of seizures, with an episode suspicious for a breakthrough seizure.  -Was on lacosamide at [place], likely related to past history of seizures  -EEG not yet performed; may be difficult to perform if patient is not cooperative.  -Currently on lacosamide 150 mg BID. Would not increase the dose at this time due to her creatinine clearance.  -Would monitor and if there is any additional suggestion of seizures, can add a second agent, possible valproic acid which may also help with agitation.  -Would avoid Tramadol for pain in patients with seizures as this medication is pro-convulsant.      # History of CVA with significant disabilities including aphasia, numerous other risk factors i.e. APLS, possible connective tissue disorder - INR subtherapeutic  -- Continue with IV heparin, bring INR > 2.5  – May obtain MRI of the brain with and without contrast once stable to rule out any inflammatory ischemic lesions, if able to tolerate and able to get safety information for MRI.      Discussed with Dr. Mejia.    Will f/u if additional input is needed from neurology service

## 2024-10-20 NOTE — SWALLOW BEDSIDE ASSESSMENT ADULT - SLP GENERAL OBSERVATIONS
pt awake and watchful: side lying, left hand in contracture. pt intermittently yelling and mewing.  No verbal output except "NO; when clinician attempted to adjust pillow under pt's head t

## 2024-10-20 NOTE — SWALLOW BEDSIDE ASSESSMENT ADULT - SWALLOW EVAL: DIAGNOSIS
oral-pharyngeal dysphagia for modified texture diet, in a setting of AMS from  her USOH, which is altered at baseline.

## 2024-10-20 NOTE — PROGRESS NOTE ADULT - SUBJECTIVE AND OBJECTIVE BOX
Date of Service:10-20-24 @ 09:34  Interval History/ROS: Afebrile overnight. Patient assessed at bedside this morning. Limited ROS due to mental status      REVIEW OF SYSTEMS  [  ] ROS unobtainable because:    [ x ] All other systems negative except as noted below    Constitutional:  [ ] fever [ ] chills  [ ] weight loss  [ ]night sweat  [ ]poor appetite/PO intake [ ]fatigue   Skin:  [ ] rash [ ] phlebitis	  Eyes: [ ] icterus [ ] pain  [ ] discharge	  ENMT: [ ] sore throat  [ ] thrush [ ] ulcers [ ] exudates [ ]anosmia  Respiratory: [ ] dyspnea [ ] hemoptysis [ ] cough [ ] sputum	  Cardiovascular:  [ ] chest pain [ ] palpitations [ ] edema	  Gastrointestinal:  [ ] nausea [ ] vomiting [ ] diarrhea [ ] constipation [ ] pain	  Genitourinary:  [ ] dysuria [ ] frequency [ ] hematuria [ ] discharge [ ] flank pain  [ ] incontinence  Musculoskeletal:  [ ] myalgias [ ] arthralgias [ ] arthritis  [ ] back pain  Neurological:  [ ] headache [ ] weakness [ ] seizures  [ ] confusion/altered mental status    Allergies  No Known Allergies        ANTIMICROBIALS:    cefepime  Injectable. 1000 every 24 hours  DAPTOmycin IVPB 550 every 48 hours        OTHER MEDS: MEDICATIONS  (STANDING):  busPIRone 10 two times a day  escitalopram 20 daily  heparin   Injectable 5500 every 6 hours PRN  heparin   Injectable 2500 every 6 hours PRN  heparin  Infusion.  <Continuous>  lacosamide IVPB 200 every 12 hours  norepinephrine Infusion 0.05 <Continuous>  pantoprazole  Injectable 40 daily      Vital Signs Last 24 Hrs  T(F): 97.9 (10-20-24 @ 06:20), Max: 99.7 (10-18-24 @ 20:03)    Vital Signs Last 24 Hrs  HR: 119 (10-20-24 @ 06:00) (84 - 122)  BP: 122/39 (10-20-24 @ 04:00) (70/24 - 138/100)  RR: 19 (10-20-24 @ 05:00)  SpO2: 96% (10-20-24 @ 05:00) (81% - 100%)  Wt(kg): --    EXAM:    Constitutional: NAD  Head/Eyes: no icterus  LUNGS:  crackles  CVS:  regular rhythm  Abd:  soft, non-tender; non-distended  Ext:  no edema  Vascular:  IV site no erythema tenderness or discharge  Neuro: AAO X 1    Labs:                        7.6    10.48 )-----------( 163      ( 20 Oct 2024 06:47 )             25.0     10-20    138  |  102  |  25[H]  ----------------------------<  87  3.5   |  26  |  4.26[H]    Ca    11.6[H]      20 Oct 2024 06:47  Phos  3.1     10-20  Mg     1.7     10-20    TPro  7.6  /  Alb  2.1[L]  /  TBili  0.8  /  DBili  x   /  AST  32  /  ALT  35  /  AlkPhos  101  10-20      WBC Trend:  WBC Count: 10.48 (10-20-24 @ 06:47)  WBC Count: 12.40 (10-19-24 @ 05:42)  WBC Count: 9.57 (10-18-24 @ 23:08)  WBC Count: 11.03 (10-18-24 @ 07:32)      Creatine Trend:  Creatinine: 4.26 (10-20)  Creatinine: 6.11 (10-19)  Creatinine: 5.07 (10-18)      Liver Biochemical Testing Trend:  Alanine Aminotransferase (ALT/SGPT): 35 (10-20)  Alanine Aminotransferase (ALT/SGPT): 42 (10-18)  Aspartate Aminotransferase (AST/SGOT): 32 (10-20-24 @ 06:47)  Aspartate Aminotransferase (AST/SGOT): 57 (10-18-24 @ 07:32)  Bilirubin Total: 0.8 (10-20)  Bilirubin Total: 0.6 (10-18)      Trend LDH      Urinalysis Basic - ( 20 Oct 2024 06:47 )    Color: x / Appearance: x / SG: x / pH: x  Gluc: 87 mg/dL / Ketone: x  / Bili: x / Urobili: x   Blood: x / Protein: x / Nitrite: x   Leuk Esterase: x / RBC: x / WBC x   Sq Epi: x / Non Sq Epi: x / Bacteria: x        MICROBIOLOGY:  Vancomycin Level, Trough: 24.1 (10-20 @ 06:47)        Culture - Blood (collected 18 Oct 2024 09:01)  Source: .Blood BLOOD  Preliminary Report:    Growth in aerobic bottle: Gram Positive Cocci in Clusters    Growth in anaerobic bottle: Gram Positive Cocci in Clusters    Direct identification is available within approximately 3-5    hours either by Blood Panel Multiplexed PCR or Direct    MALDI-TOF. Details: [email or web address]  Organism: Blood Culture PCR  Organism: Blood Culture PCR    Sensitivities:      Method Type: PCR      -  Coagulase negative Staphylococcus: Detec    Culture - Blood (collected 18 Oct 2024 07:32)  Source: .Blood BLOOD  Preliminary Report:    No growth at 24 hours    Urinalysis with Rflx Culture (collected 18 Oct 2024 07:30)      Lactate, Blood: 1.6 (10-18 @ 07:32)        RADIOLOGY:  imaging below personally reviewed    Xray Chest 1 View- PORTABLE-Urgent:  (18 Oct 2024 09:46)              CT Abdomen and Pelvis w/ IV Cont:   ACC: [id number] EXAM:  CT ABDOMEN AND PELVIS IC   ORDERED BY: GINETTE AQUINO     ACC: [id number] EXAM:  CT ANGIO CHEST PULM ART WAWIC   ORDERED BY: GINETTE AQUINO     PROCEDURE DATE:  10/18/2024          INTERPRETATION:  CLINICAL INDICATION: Sepsis    TECHNIQUE: CT Angiography of the Chest was performed followed by portal   venous phase imaging of the Abdomen and Pelvis. Sagittal and coronal   reformats were performed as well as 3D (MIP) reconstructions.    CONTRAST/COMPLICATIONS:  IV Contrast: Omnipaque 350 90 cc administered   0 cc discarded  Oral Contrast: NONE  Complications: None reported at time of study completion    COMPARISON: CT chest 10/18/2024    FINDINGS:  CTA: The study is technically adequate with a good contrast bolus to the   pulmonary arteries. There are no filling defects in the pulmonary artery   or its branches. The cardiac chambers appear normal in size. The RV: LV   ratio is less than 1. There is no pericardial effusion. The great vessels   are normal in size. There is focal nonopacification of a portion of the   right subclavian artery stent (series 2, image 17).    Lungs/Airways/Pleura: Scattered areas of subsegmental atelectasis.  The   central airways are patent. No pleural effusion.    Mediastinum/Lymph nodes: Nothoracic adenopathy.    ABDOMEN:  Liver: Normal in size and contour  Bile ducts: No intrahepatic biliary dilatation  Gallbladder: Fluid filled  Pancreas: Unremarkable  Spleen: Normal size  Adrenal glands: Unremarkable    Kidneys: Symmetric size and enhancement.  Abdominal aorta: Normal course and caliber  Retroperitoneum: No lymphadenopathy  Peritoneum: No free air, fluid collection, or ascites    Bowel:  Normal caliber small bowel and colon    PELVIS:  Urinary bladder: Normal wall thickness  Pelvic and inguinal lymph nodes: Normal size  Reproductive: Large partially calcified uterine lesions, with large   pedunculated 14cm fibroid extending up to the left hemiabdomen    Soft tissues:  Unremarkable subcutaneous and muscular soft tissues    Bones: Unremarkable for age    IMPRESSION:  No pulmonary embolism.    Focal nonopacification of a portion of the right subclavian artery stent,   suspicious for thrombus. Correlate with Doppler imaging.    Enlarged myomatous uterus, with large pedunculated 14cm fibroid extending   up to the left hemiabdomen.  Recommend GYN evaluation.    --- End of Report ---            ROSS BROWN DO; Attending Radiologist  This document has been electronically signed. Oct 18 2024  2:30PM (10-18-24 @ 12:40)  CT Head No Cont:   ACC: [id number] EXAM:  CT BRAIN   ORDERED BY: BETI GALVAN     PROCEDURE DATE:  10/18/2024          INTERPRETATION:  CLINICAL INFORMATION: AMS    COMPARISON: None.    CONTRAST:  IV Contrast: NONE  Complications: None reported at time of study completion    TECHNIQUE:  Serial axial images were obtained from the skull base to the   vertex using multi-slice helical technique. Sagittal and coronal   reformats were obtained.    FINDINGS:    There is diffuse prompt cortical sulci, fissures and ventricles related   to generalized volume loss greater than typical for patient age. There is   right frontoparietal encephalomalacia/gliosis with internal   calcifications. There is extensive periventricular and subcortical white   matter hypodensities likely related to small vessel ischemic   disease/chronic infarct. There is a chronic left thalamic lacunar   infarct. There is no midline shift.    There is no acute intracranial hemorrhage. There are no abnormal   extra-axial collections. There is a partially empty sella turcica. There   is diffuse atherosclerotic  calcifications of the distal internal carotid   arteries.    The visualized orbits are intact. There is no acute sinusitis. The   tympanomastoid cavities are well-aerated.      IMPRESSION:    No acute intracranial  hemorrhage or midline shift.    Right frontal temporal encephalomalacia/gliosis.    Extensive white matter small vessel ischemic disease/chronic infarct.    Chronic left thalamic infarct.    Generalized volume loss with ventricular dilatation.    --- End of Report ---            EMILY HUDSON MD; Attending Radiologist  This document has been electronically signed. Oct 18 2024 10:25AM (10-18-24 @ 10:03)  CT Chest No Cont:   ACC: [id number] EXAM:  CT CHEST   ORDERED BY: BETI GALVAN     PROCEDURE DATE:  10/18/2024          INTERPRETATION:  CLINICAL INFORMATION: Altered mental status, evaluate   for pneumonia    COMPARISON: None.    CONTRAST/COMPLICATIONS:  IV Contrast: NONE  Oral Contrast: NONE  Complications: None reported at time of study completion    PROCEDURE:  CT scan of the chest was obtained without intravenous contrast.    FINDINGS:    AIRWAYS/LUNGS/PLEURA: Patent central airways. Scattered areas of   subsegmental atelectasis. Multiple bilateral subcentimeter calcified   granulomas.    MEDIASTINUM AND MANUEL: No lymphadenopathy.    VESSELS: Aortic calcifications. Coronary artery calcifications. Right   subclavian vein stent.    HEART: Heart size is mildly enlarged. No pericardial effusion.    VISUALIZED UPPER ABDOMEN: Within normal limits.    CHEST WALL AND BONES: Degenerative changes.    IMPRESSION:    No CT evidence of pneumonia.    --- End of Report ---      NATE EDWARDS MD; Resident Radiologist  This document has been electronically signed.  ROSS BROWN DO; Attending Radiologist  This document has been electronically signed. Oct 18 2024 10:54AM (10-18-24 @ 10:03)

## 2024-10-20 NOTE — CONSULT NOTE ADULT - ASSESSMENT
Ms. Workman is a 55 yo F with a history of antiphospholipid syndrome, ESRD (on HD M/W/F), right internal jugular vein thrombus, HTN, prior CVA, endocarditis, and HLD, with recent admission at [facility] for MRSA Endocarditis on vancomycin via PICC line (completed course of Abx on 10/18) who presented to the ED on 10/18/24 due to hypothermia, hypoxia, and hypotension at Rehab center. As per ICU H&P patient was found to have spO2 pf 60%, BP of 87/55 and temperature of 97.2 currently on pressors with positive blood cultures. GI consulted due to concern for GIB with dark FOBT positive stools and downtrend of Hb from 9.6 on 10/18 now down to 7.9 today.       #Concern for GI bleed  #Macrocytic anemia  ***   Ms. Workman is a 53 yo F with a history of antiphospholipid syndrome, ESRD (on HD M/W/F), right internal jugular vein thrombus, HTN, prior CVA, endocarditis, and HLD, with recent admission at [facility] for MRSA Endocarditis on vancomycin via PICC line (completed course of Abx on 10/18) who presented to the ED on 10/18/24 due to hypothermia, hypoxia, and hypotension at Rehab center. As per ICU H&P patient was found to have spO2 pf 60%, BP of 87/55 and temperature of 97.2 currently on pressors with positive blood cultures. GI consulted due to concern for GIB with dark FOBT positive stools and downtrend of Hb from 9.6 on 10/18 now down to 7.9 today.       #Concern for GI bleed  #Macrocytic anemia  Patient noted to have light brown stool on exam today. Low suspicion for luminal GI blood loss for anemia. Likely hemodynamic instability in the setting of infectious etiology.  - Would continue to monitor hemoglobin and stool output  - Ensure patient has 2 large bore IVs  - Transfuse Hb if <7  - No indication for EGD/colonoscopy at this time   Ms. Workman is a 53 yo F with a history of antiphospholipid syndrome, ESRD (on HD M/W/F), right internal jugular vein thrombus, HTN, prior CVA, endocarditis, and HLD, with recent admission at [facility] for MRSA Endocarditis on vancomycin via PICC line (completed course of Abx on 10/18) who presented to the ED on 10/18/24 due to hypothermia, hypoxia, and hypotension at Rehab center. As per ICU H&P patient was found to have spO2 pf 60%, BP of 87/55 and temperature of 97.2 currently on pressors with positive blood cultures. GI consulted due to concern for GIB with dark FOBT positive stools and downtrend of Hb from 9.6 on 10/18 now down to 7.9 today.       #Concern for GI bleed  #Macrocytic anemia  Patient noted to have light brown stool on exam today. Low suspicion for luminal GI blood loss for anemia. Likely hemodynamic instability in the setting of infectious etiology.  - Would continue to monitor hemoglobin and stool output  - Ensure patient has 2 large bore IVs  - Transfuse Hb if <7  - No indication for EGD/colonoscopy at this time  - If stable hemoglobin and brown stools okay to resume heparin drip  - Would obtain B12 and folate levels    Please contact GI service if any questions/concerns or change to patient's clinical course.   Ms. Workman is a 53 yo F with a history of antiphospholipid syndrome, ESRD (on HD M/W/F), right internal jugular vein thrombus, HTN, prior CVA, endocarditis, and HLD, with recent admission at [facility] for MRSA Endocarditis on vancomycin via PICC line (completed course of Abx on 10/18) who presented to the ED on 10/18/24 due to hypothermia, hypoxia, and hypotension at Rehab center. As per ICU H&P patient was found to have spO2 pf 60%, BP of 87/55 and temperature of 97.2 currently on pressors with positive blood cultures. GI consulted due to concern for GIB with dark FOBT positive stools and downtrend of Hb from 9.6 on 10/18 now down to 7.9 today.       #Concern for GI bleed  #Macrocytic anemia  Patient noted to have light brown stool on exam today. Low suspicion for luminal GI blood loss for anemia. Likely hemodynamic instability in the setting of infectious etiology.  - Would continue to monitor hemoglobin and stool output  - Ensure patient has 2 large bore IVs  - Transfuse Hb if <7  - No indication for urgent EGD/colonoscopy at this time  - If stable hemoglobin and brown stools okay to resume heparin drip  - Would obtain B12 and folate levels    Please contact GI service if any questions/concerns or change to patient's clinical course.

## 2024-10-20 NOTE — PROGRESS NOTE ADULT - SUBJECTIVE AND OBJECTIVE BOX
OVERNIGHT EVENTS / SUBJECTIVE: Patient seen and examined at bedside.     Pt agitated this AM, yelling intermittently. On levo 0.07. Pt noted to have red blood mixed with stool, hgb 7.9 => 7.6. Hep gtt paused and GI consulted.    OBJECTIVE:    VITAL SIGNS:  ICU Vital Signs Last 24 Hrs  T(C): 36.6 (20 Oct 2024 06:20), Max: 36.9 (20 Oct 2024 00:26)  T(F): 97.9 (20 Oct 2024 06:20), Max: 98.5 (20 Oct 2024 00:26)  HR: 99 (20 Oct 2024 19:00) (84 - 128)  BP: 92/60 (20 Oct 2024 19:00) (64/17 - 159/127)  BP(mean): 72 (20 Oct 2024 19:00) (30 - 831)  ABP: --  ABP(mean): --  RR: 16 (20 Oct 2024 19:00) (13 - 45)  SpO2: 97% (20 Oct 2024 19:00) (87% - 100%)          10-19 @ 07:01  -  10-20 @ 07:00  --------------------------------------------------------  IN: 901 mL / OUT: 1500 mL / NET: -599 mL    10-20 @ 07:01  -  10-20 @ 21:05  --------------------------------------------------------  IN: 426 mL / OUT: 0 mL / NET: 426 mL      CAPILLARY BLOOD GLUCOSE          PHYSICAL EXAM:    General: In NAD  HEENT: NC/AT; clear conjunctiva  Neck: supple  Respiratory: CTA b/l  Cardiovascular: +S1/S2; RRR, R AVF w/ weak thrill  Abdomen: soft, nondistended, nontender  Extremities: Warm, no LE edema  Skin: No rash  Neurological: Opens eyes to voice, not following commands or answering other questions, responds to pain    MEDICATIONS:  MEDICATIONS  (STANDING):  busPIRone 10 milliGRAM(s) Oral two times a day  cefepime  Injectable. 1000 milliGRAM(s) IV Push every 24 hours  chlorhexidine 2% Cloths 1 Application(s) Topical <User Schedule>  collagenase Ointment 1 Application(s) Topical daily  DAPTOmycin IVPB 550 milliGRAM(s) IV Intermittent every 48 hours  escitalopram 20 milliGRAM(s) Oral daily  heparin  Infusion. 900 Unit(s)/Hr (9 mL/Hr) IV Continuous <Continuous>  lacosamide IVPB 150 milliGRAM(s) IV Intermittent every 12 hours  norepinephrine Infusion 0.05 MICROgram(s)/kG/Min (6.33 mL/Hr) IV Continuous <Continuous>  pantoprazole  Injectable 40 milliGRAM(s) IV Push every 12 hours    MEDICATIONS  (PRN):  haloperidol    Injectable 5 milliGRAM(s) IV Push every 6 hours PRN agitation  heparin   Injectable 2500 Unit(s) IV Push every 6 hours PRN For aPTT between 40 - 57  heparin   Injectable 5500 Unit(s) IV Push every 6 hours PRN For aPTT less than 40  LORazepam   Injectable 1 milliGRAM(s) IV Push every 6 hours PRN Agitation      ALLERGIES:  Allergies    No Known Allergies    Intolerances        LABS:                        7.9    10.70 )-----------( 162      ( 20 Oct 2024 12:51 )             26.0     Hemoglobin: 7.9 g/dL (10-20 @ 12:51)  Hemoglobin: 7.6 g/dL (10-20 @ 06:47)  Hemoglobin: 7.9 g/dL (10-19 @ 05:42)  Hemoglobin: 8.5 g/dL (10-18 @ 23:08)  Hemoglobin: 9.6 g/dL (10-18 @ 07:32)    CBC Full  -  ( 20 Oct 2024 12:51 )  WBC Count : 10.70 K/uL  RBC Count : 2.52 M/uL  Hemoglobin : 7.9 g/dL  Hematocrit : 26.0 %  Platelet Count - Automated : 162 K/uL  Mean Cell Volume : 103.2 fl  Mean Cell Hemoglobin : 31.3 pg  Mean Cell Hemoglobin Concentration : 30.4 gm/dL  Auto Neutrophil # : x  Auto Lymphocyte # : x  Auto Monocyte # : x  Auto Eosinophil # : x  Auto Basophil # : x  Auto Neutrophil % : x  Auto Lymphocyte % : x  Auto Monocyte % : x  Auto Eosinophil % : x  Auto Basophil % : x    10-20    138  |  102  |  25[H]  ----------------------------<  87  3.5   |  26  |  4.26[H]    Ca    11.6[H]      20 Oct 2024 06:47  Phos  3.1     10-20  Mg     1.7     10-20    TPro  7.6  /  Alb  2.1[L]  /  TBili  0.8  /  DBili  x   /  AST  32  /  ALT  35  /  AlkPhos  101  10-20    Creatinine Trend: 4.26<--, 6.11<--, 5.07<--  LIVER FUNCTIONS - ( 20 Oct 2024 06:47 )  Alb: 2.1 g/dL / Pro: 7.6 gm/dL / ALK PHOS: 101 U/L / ALT: 35 U/L / AST: 32 U/L / GGT: x           PT/INR - ( 19 Oct 2024 05:42 )   PT: 15.8 sec;   INR: 1.34 ratio         PTT - ( 20 Oct 2024 12:51 )  PTT:68.4 sec    hs Troponin:            Urinalysis Basic - ( 20 Oct 2024 06:47 )    Color: x / Appearance: x / SG: x / pH: x  Gluc: 87 mg/dL / Ketone: x  / Bili: x / Urobili: x   Blood: x / Protein: x / Nitrite: x   Leuk Esterase: x / RBC: x / WBC x   Sq Epi: x / Non Sq Epi: x / Bacteria: x      CSF:                      EKG:   MICROBIOLOGY:    Culture - Blood (collected 18 Oct 2024 09:01)  Source: .Blood BLOOD  Gram Stain (19 Oct 2024 14:47):    Growth in aerobic bottle: Gram Positive Cocci in Clusters    Growth in anaerobic bottle: Gram Positive Cocci in Clusters  Final Report (20 Oct 2024 14:57):    Growth in aerobic bottle: Staphylococcus hominis    Growth in anaerobic bottle: Staphylococcus capitis    Isolation of Coagulase negative Staphylococcus from single blood culture    sets may represent    contamination. Contact the Microbiology Department at [phone] if    susceptibility testing is needed.    clinically indicated.    Direct identification is available within approximately 3-5    hours either by Blood Panel Multiplexed PCR or Direct    MALDI-TOF. Details: [email or web address]  Organism: Blood Culture PCR (20 Oct 2024 14:57)  Organism: Blood Culture PCR (20 Oct 2024 14:57)    Culture - Blood (collected 18 Oct 2024 07:32)  Source: .Blood BLOOD  Preliminary Report (20 Oct 2024 13:00):    No growth at 48 Hours    Urinalysis with Rflx Culture (collected 18 Oct 2024 07:30)      IMAGING:      Labs, imaging, EKG personally reviewed    RADIOLOGY & ADDITIONAL TESTS: Reviewed.

## 2024-10-20 NOTE — SWALLOW BEDSIDE ASSESSMENT ADULT - SWALLOW EVAL: RECOMMENDED FEEDING/EATING TECHNIQUES
if possiblr ,. upright and in alignment. Pt appears to be rigid and contracted/allow for swallow between intakes/check mouth frequently for oral residue/pocketing/crush medication (when feasible)

## 2024-10-20 NOTE — SWALLOW BEDSIDE ASSESSMENT ADULT - ORAL PREPARATORY PHASE
pt slightly turned head to accept the straw and spoon; full lip seal  on straw: full mouth closure on spoon.  NO anterior or lateral loss.

## 2024-10-20 NOTE — PROGRESS NOTE ADULT - ASSESSMENT
55 y/o F with PMH antiphospholipid syndrome on coumadin, ?lupus (on plaquenil), Sz d/o? (on vimpat), ESRD on HD M/W/F, R IJ VTE, CVA with baseline aphasia and ?bedbound, HTN, recently admitted at [facility] for MRSA endocarditis being treated w/ IV vancomycin through PICC line (last day 10/18? per paperwork in chart) sent to the ED from [facility] rehab for hypoxia, hypotension, decreased responsiveness. Pt found to be hypotensive in the ED despite 2.5L IVF thus started on pressors and admitted to CCU for further evaluation and management.    Plan:  Neuro:  - C/w home vimpat, pt had ?Sz in CCU w/ grunting and tensed up resolved on its own on 10/19  - CTH no acute findings, chronic infarcts and encephalomalacia  - Neuro consulted, routine EEG today if abnormal will consider 24h EEG  - C/w vimpat 150mg BID  - MRI brain w/wo contrast once more stable to r/o inflammatory or ischemic lesions  CV:  - On levo 0.07, titrate as tolerated, unclear source of shock, mild leukocytosis ?infection, was being treated for MRSA infective endocarditis, here +blood cxs with CoNS ?contaminant vs less likely cardiogenic  - TTE 10/18 w/ normal LVEF 50-55%, RWMAs present, troponin uptrended but in setting of ESRD, normal RV size/function, on hep gtt, will obtain cards c/s  - CTA chest w/ no PE, possible R subclavian thrombus, arterial duplex RUE compatible w/ occluded or almost occluded medial portion of R subclavian stent but with patent flow within the R axillary, brachial, radial and ulnar arteries, extremity is warm with palpable distal pulse on exam  - C/w hep gtt given hx of APLS  Pulm:  - On NC, titrate as tolerated  - CTA chest w/ no PE, some areas of atelectasis, no evidence of pneumonia, cont to monitor on NC  GI:  - Pt had ?bloody BM today, GI consulted, brown stool on their exam today, will cont to monitor hgb and stool output closely, rpt hgb today uptrending hep gtt restarted given high risk APLS  - S&S rec soft and bite sized diet  - c/w PPI  Renal:  - ESRD, HD yesterday, next monday or tuesday per nephro, R AVF functioning for HD  - F/u nephro recs  ID:  - Was on IV vanc for MRSA endocarditis, last day 10/18 per paperwork, ID consulted  - Blood cxs x1 with CoNS, ?contaminant, f/u repeat blood cultures  - Midline present on arrival removed  - C/w daptomycin and cefepime empirically per ID  - UA negative  Heme:  - c/w hep gtt as above  - No melena or hematochezia, no active bleeding noted, possible acute on chronic anemia in setting of critical illness/ESRD will trend  Endo:  - Glucose wnl on BMP  - TSH wnl    Dispo: CCU, on pressors, f/u repeat blood cxs, c/w empiric abx. Spoke with patient's reported HCP Toribio Villalobos  to update him on the patient's medical care yesterday. He confirms patient has no family and he is all she has, he states he does not have HCP paperwork currently, but should be at [facility]. He confirms patient is DNR with trial of intubation. Will attempt to reach [facility] again to obtain this HCP paperwork, or possibly from SW at [facility] (where she was admitted prior to [facility]).

## 2024-10-20 NOTE — SWALLOW BEDSIDE ASSESSMENT ADULT - NS SPL SWALLOW CLINIC TRIAL FT
Maintain soft and bite size. Pt is a challenging feed because of behavior, but can take po.  Swallow skills WFL for soft and bite size and thin liquids depsite altered sensorium and behavioral issues.   Service will not follow. Disc with Attending and with NSg.

## 2024-10-20 NOTE — PROGRESS NOTE ADULT - ASSESSMENT
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) via lefr arm picc  rt AVF   hypercalcemia   SHock r/o sepsis     REC  - no urgent indication for hd now   - no identifiable reachable next of kin/hcp will plan for 2PC for HD consent   - rt AVF with + briuit, weak thrill   - hypercalcemia work-up including spep and upep   - fu pt/inr, pt may require temp hd catheter     coumadin on hold until results available   - pressor support with levophed  - s/p CTA with A/P await results  - echo   - abg   - fu cx send   dw RENITA Bedolla and Dr White    10/19 MK   - ESRD     hd today and will attempt uf with hd     via avf   - APL no now on iv heparin    rt subclavian clot     hx of cva with aphasia   - GPC sepsis     dapto and cefepime as per ID, renally dosed     fu cx     trend of outpt cx noted from id noted in sept cleared bacteremia     TTE no vegetation     ? need to dc PICC line   dw dr white and ccu team     10/20 MK   - ESRD      likely tts while inpt, sooner if clinically indicated     via avf, fx well with hd yesterday   - APL no now on iv heparin    rt subclavian clot     hx of cva with aphasia   - melena for hold of heparin     no renal barrier for IV contrast   - GPC sepsis     dapto and cefepime as per ID, renally dosed     fu cx     trend of outpt cx noted from id noted in sept cleared bacteremia     TTE no vegetation     ? need to dc PICC line   dw dr whtie and ccu team

## 2024-10-21 DIAGNOSIS — R93.1 ABNORMAL FINDINGS ON DIAGNOSTIC IMAGING OF HEART AND CORONARY CIRCULATION: ICD-10-CM

## 2024-10-21 LAB
ABO RH CONFIRMATION: SIGNIFICANT CHANGE UP
ALBUMIN SERPL ELPH-MCNC: 2.1 G/DL — LOW (ref 3.3–5)
ALP SERPL-CCNC: 90 U/L — SIGNIFICANT CHANGE UP (ref 40–120)
ALT FLD-CCNC: 27 U/L — SIGNIFICANT CHANGE UP (ref 12–78)
ANION GAP SERPL CALC-SCNC: 10 MMOL/L — SIGNIFICANT CHANGE UP (ref 5–17)
APTT BLD: 48.3 SEC — HIGH (ref 24.5–35.6)
APTT BLD: 58.4 SEC — HIGH (ref 24.5–35.6)
AST SERPL-CCNC: 29 U/L — SIGNIFICANT CHANGE UP (ref 15–37)
BASOPHILS # BLD AUTO: 0.02 K/UL — SIGNIFICANT CHANGE UP (ref 0–0.2)
BASOPHILS NFR BLD AUTO: 0.2 % — SIGNIFICANT CHANGE UP (ref 0–2)
BILIRUB SERPL-MCNC: 0.6 MG/DL — SIGNIFICANT CHANGE UP (ref 0.2–1.2)
BUN SERPL-MCNC: 34 MG/DL — HIGH (ref 7–23)
CALCIUM SERPL-MCNC: 11.2 MG/DL — HIGH (ref 8.5–10.1)
CHLORIDE SERPL-SCNC: 106 MMOL/L — SIGNIFICANT CHANGE UP (ref 96–108)
CO2 SERPL-SCNC: 25 MMOL/L — SIGNIFICANT CHANGE UP (ref 22–31)
CREAT SERPL-MCNC: 5.75 MG/DL — HIGH (ref 0.5–1.3)
EGFR: 8 ML/MIN/1.73M2 — LOW
EOSINOPHIL # BLD AUTO: 0.02 K/UL — SIGNIFICANT CHANGE UP (ref 0–0.5)
EOSINOPHIL NFR BLD AUTO: 0.2 % — SIGNIFICANT CHANGE UP (ref 0–6)
GLUCOSE SERPL-MCNC: 97 MG/DL — SIGNIFICANT CHANGE UP (ref 70–99)
HCT VFR BLD CALC: 22.9 % — LOW (ref 34.5–45)
HCT VFR BLD CALC: 26.5 % — LOW (ref 34.5–45)
HGB BLD-MCNC: 7 G/DL — CRITICAL LOW (ref 11.5–15.5)
HGB BLD-MCNC: 8.4 G/DL — LOW (ref 11.5–15.5)
IMM GRANULOCYTES NFR BLD AUTO: 0.8 % — SIGNIFICANT CHANGE UP (ref 0–0.9)
LYMPHOCYTES # BLD AUTO: 1.45 K/UL — SIGNIFICANT CHANGE UP (ref 1–3.3)
LYMPHOCYTES # BLD AUTO: 16 % — SIGNIFICANT CHANGE UP (ref 13–44)
MAGNESIUM SERPL-MCNC: 1.8 MG/DL — SIGNIFICANT CHANGE UP (ref 1.6–2.6)
MCHC RBC-ENTMCNC: 30.6 GM/DL — LOW (ref 32–36)
MCHC RBC-ENTMCNC: 31 PG — SIGNIFICANT CHANGE UP (ref 27–34)
MCHC RBC-ENTMCNC: 31.2 PG — SIGNIFICANT CHANGE UP (ref 27–34)
MCHC RBC-ENTMCNC: 31.7 GM/DL — LOW (ref 32–36)
MCV RBC AUTO: 101.3 FL — HIGH (ref 80–100)
MCV RBC AUTO: 98.5 FL — SIGNIFICANT CHANGE UP (ref 80–100)
MONOCYTES # BLD AUTO: 0.66 K/UL — SIGNIFICANT CHANGE UP (ref 0–0.9)
MONOCYTES NFR BLD AUTO: 7.3 % — SIGNIFICANT CHANGE UP (ref 2–14)
NEUTROPHILS # BLD AUTO: 6.82 K/UL — SIGNIFICANT CHANGE UP (ref 1.8–7.4)
NEUTROPHILS NFR BLD AUTO: 75.5 % — SIGNIFICANT CHANGE UP (ref 43–77)
PHOSPHATE SERPL-MCNC: 3.1 MG/DL — SIGNIFICANT CHANGE UP (ref 2.5–4.5)
PLATELET # BLD AUTO: 155 K/UL — SIGNIFICANT CHANGE UP (ref 150–400)
PLATELET # BLD AUTO: 157 K/UL — SIGNIFICANT CHANGE UP (ref 150–400)
POTASSIUM SERPL-MCNC: 3.6 MMOL/L — SIGNIFICANT CHANGE UP (ref 3.5–5.3)
POTASSIUM SERPL-SCNC: 3.6 MMOL/L — SIGNIFICANT CHANGE UP (ref 3.5–5.3)
PROT SERPL-MCNC: 7.1 GM/DL — SIGNIFICANT CHANGE UP (ref 6–8.3)
RBC # BLD: 2.26 M/UL — LOW (ref 3.8–5.2)
RBC # BLD: 2.69 M/UL — LOW (ref 3.8–5.2)
RBC # FLD: 19.9 % — HIGH (ref 10.3–14.5)
RBC # FLD: 22.7 % — HIGH (ref 10.3–14.5)
SODIUM SERPL-SCNC: 141 MMOL/L — SIGNIFICANT CHANGE UP (ref 135–145)
TROPONIN I, HIGH SENSITIVITY RESULT: 174.25 NG/L — HIGH
WBC # BLD: 10.32 K/UL — SIGNIFICANT CHANGE UP (ref 3.8–10.5)
WBC # BLD: 9.04 K/UL — SIGNIFICANT CHANGE UP (ref 3.8–10.5)
WBC # FLD AUTO: 10.32 K/UL — SIGNIFICANT CHANGE UP (ref 3.8–10.5)
WBC # FLD AUTO: 9.04 K/UL — SIGNIFICANT CHANGE UP (ref 3.8–10.5)

## 2024-10-21 PROCEDURE — 99232 SBSQ HOSP IP/OBS MODERATE 35: CPT

## 2024-10-21 PROCEDURE — 99223 1ST HOSP IP/OBS HIGH 75: CPT | Mod: FS

## 2024-10-21 PROCEDURE — 99233 SBSQ HOSP IP/OBS HIGH 50: CPT

## 2024-10-21 PROCEDURE — 99233 SBSQ HOSP IP/OBS HIGH 50: CPT | Mod: FS

## 2024-10-21 PROCEDURE — 99223 1ST HOSP IP/OBS HIGH 75: CPT

## 2024-10-21 RX ORDER — CHLORHEXIDINE GLUCONATE 40 MG/ML
1 SOLUTION TOPICAL
Refills: 0 | Status: DISCONTINUED | OUTPATIENT
Start: 2024-10-21 | End: 2024-10-29

## 2024-10-21 RX ORDER — ACETAMINOPHEN 500 MG
1000 TABLET ORAL ONCE
Refills: 0 | Status: COMPLETED | OUTPATIENT
Start: 2024-10-21 | End: 2024-10-21

## 2024-10-21 RX ORDER — LACOSAMIDE 100 MG/1
150 TABLET ORAL
Refills: 0 | Status: DISCONTINUED | OUTPATIENT
Start: 2024-10-21 | End: 2024-10-29

## 2024-10-21 RX ADMIN — LACOSAMIDE 150 MILLIGRAM(S): 100 TABLET ORAL at 21:27

## 2024-10-21 RX ADMIN — LACOSAMIDE 100 MILLIGRAM(S): 100 TABLET ORAL at 11:00

## 2024-10-21 RX ADMIN — Medication 1 MILLIGRAM(S): at 21:27

## 2024-10-21 RX ADMIN — PANTOPRAZOLE SODIUM 40 MILLIGRAM(S): 40 TABLET, DELAYED RELEASE ORAL at 10:59

## 2024-10-21 RX ADMIN — CHLORHEXIDINE GLUCONATE 1 APPLICATION(S): 40 SOLUTION TOPICAL at 05:05

## 2024-10-21 RX ADMIN — BUSPIRONE HYDROCHLORIDE 10 MILLIGRAM(S): 15 TABLET ORAL at 21:27

## 2024-10-21 RX ADMIN — BUSPIRONE HYDROCHLORIDE 10 MILLIGRAM(S): 15 TABLET ORAL at 10:59

## 2024-10-21 RX ADMIN — HEPARIN SODIUM 900 UNIT(S)/HR: 10000 INJECTION INTRAVENOUS; SUBCUTANEOUS at 09:37

## 2024-10-21 RX ADMIN — HEPARIN SODIUM 900 UNIT(S)/HR: 10000 INJECTION INTRAVENOUS; SUBCUTANEOUS at 07:14

## 2024-10-21 RX ADMIN — ESCITALOPRAM 20 MILLIGRAM(S): 10 TABLET, FILM COATED ORAL at 10:59

## 2024-10-21 RX ADMIN — PANTOPRAZOLE SODIUM 40 MILLIGRAM(S): 40 TABLET, DELAYED RELEASE ORAL at 21:28

## 2024-10-21 RX ADMIN — Medication 1 APPLICATION(S): at 10:59

## 2024-10-21 RX ADMIN — Medication 400 MILLIGRAM(S): at 06:10

## 2024-10-21 RX ADMIN — HEPARIN SODIUM 2500 UNIT(S): 10000 INJECTION INTRAVENOUS; SUBCUTANEOUS at 17:34

## 2024-10-21 RX ADMIN — DAPTOMYCIN 122 MILLIGRAM(S): 500 INJECTION, POWDER, LYOPHILIZED, FOR SOLUTION INTRAVENOUS at 10:59

## 2024-10-21 RX ADMIN — HEPARIN SODIUM 1000 UNIT(S)/HR: 10000 INJECTION INTRAVENOUS; SUBCUTANEOUS at 17:31

## 2024-10-21 NOTE — PROGRESS NOTE ADULT - ASSESSMENT
55 y/o female PMH APS on warfarin, ESRD on HD, ?lupus on Plaquenil, CVA, functional quadriplegia, seizure d/o on Vimpat, recent hospitalization at [facility] for MRSA endocarditis (finished vanc on 10/18), presented from [place] for hypotension, hypoxia and poor responsiveness     Remained hypotensive in ED despite 2.5L IVF, started on pressor and admitted to CCU       Problem list:     Hypotension/shock multifactorial - hypovolemic/medications  Acute toxic encephalopathy due to meds/polypharmacy   Suspected GI bleed with acute blood loss anemia       Plan:     -mental status is ok today  -continue home med - Buspar, Lexapro, prn Haldol and Ativan   -continue Vimpat, no seizure   -BP stable without pressor   -resp stable on RA   -BCx likely contaminant, no MRSA isolated, remains on dapto and cefepime - defer mx to ID   -dialysis per renal, next on 10/22   -transfuse 1 PRBC today, repeat CBC in pm, continue PPI q12. FOBT + but no active melena/hematochezia. No urgent need for invasive intervention per GI   -continue iv heparin, hold off on transitioning to warfarin until H/H stable  -DNR, not DNI       Stable for floor, sign out given to Dr. Harris       Status and plan d/w HCP Ricky Villalobos ([phone]) by phone

## 2024-10-21 NOTE — PROGRESS NOTE ADULT - SUBJECTIVE AND OBJECTIVE BOX
Patient is a 54y old  Female who presents with a chief complaint of Shock (21 Oct 2024 11:42)    Interval event/s: off Levo. Denies pain, SOB, N, V. BMs brown colored, no obvious bleeding    Allergies    No Known Allergies    Intolerances      REVIEW OF SYSTEMS: SEE BELOW       ICU Vital Signs Last 24 Hrs  T(C): 36.7 (21 Oct 2024 11:15), Max: 37.2 (21 Oct 2024 00:08)  T(F): 98 (21 Oct 2024 11:15), Max: 99 (21 Oct 2024 00:08)  HR: 104 (21 Oct 2024 11:30) (91 - 115)  BP: 105/54 (21 Oct 2024 11:30) (71/59 - 123/55)  BP(mean): 70 (21 Oct 2024 11:30) (64 - 96)  ABP: --  ABP(mean): --  RR: 12 (21 Oct 2024 11:30) (11 - 27)  SpO2: 99% (21 Oct 2024 11:30) (94% - 100%)    O2 Parameters below as of 21 Oct 2024 10:00  Patient On (Oxygen Delivery Method): room air            CAPILLARY BLOOD GLUCOSE          I&O's Summary    20 Oct 2024 07:01  -  21 Oct 2024 07:00  --------------------------------------------------------  IN: 541.2 mL / OUT: 0 mL / NET: 541.2 mL            MEDICATIONS  (STANDING):  busPIRone 10 milliGRAM(s) Oral two times a day  cefepime  Injectable. 1000 milliGRAM(s) IV Push every 24 hours  collagenase Ointment 1 Application(s) Topical daily  DAPTOmycin IVPB 550 milliGRAM(s) IV Intermittent every 48 hours  escitalopram 20 milliGRAM(s) Oral daily  heparin  Infusion. 900 Unit(s)/Hr (9 mL/Hr) IV Continuous <Continuous>  lacosamide IVPB 150 milliGRAM(s) IV Intermittent every 12 hours  pantoprazole  Injectable 40 milliGRAM(s) IV Push every 12 hours      MEDICATIONS  (PRN):  haloperidol    Injectable 5 milliGRAM(s) IV Push every 6 hours PRN agitation  heparin   Injectable 2500 Unit(s) IV Push every 6 hours PRN For aPTT between 40 - 57  heparin   Injectable 5500 Unit(s) IV Push every 6 hours PRN For aPTT less than 40  LORazepam   Injectable 1 milliGRAM(s) IV Push every 6 hours PRN Agitation      PHYSICAL EXAM: SEE BELOW                          7.0    9.04  )-----------( 157      ( 21 Oct 2024 08:23 )             22.9       10-21    141  |  106  |  34[H]  ----------------------------<  97  3.6   |  25  |  5.75[H]    Ca    11.2[H]      21 Oct 2024 08:23  Phos  3.1     10-21  Mg     1.8     10-21    TPro  7.1  /  Alb  2.1[L]  /  TBili  0.6  /  DBili  x   /  AST  29  /  ALT  27  /  AlkPhos  90  10-21          PTT - ( 21 Oct 2024 08:23 )  PTT:58.4 sec  Urinalysis Basic - ( 21 Oct 2024 08:23 )    Color: x / Appearance: x / SG: x / pH: x  Gluc: 97 mg/dL / Ketone: x  / Bili: x / Urobili: x   Blood: x / Protein: x / Nitrite: x   Leuk Esterase: x / RBC: x / WBC x   Sq Epi: x / Non Sq Epi: x / Bacteria: x      .Blood BLOOD   Growth in aerobic bottle: Staphylococcus hominis  Growth in anaerobic bottle: Staphylococcus capitis  Isolation of Coagulase negative Staphylococcus from single blood culture  sets may represent  contamination. Contact the Microbiology Department at [phone] if  susceptibility testing is needed.  clinically indicated.  Direct identification is available within approximately 3-5  hours either by Blood Panel Multiplexed PCR or Direct  MALDI-TOF. Details: [email or web address]   Growth in aerobic bottle: Gram Positive Cocci in Clusters  Growth in anaerobic bottle: Gram Positive Cocci in Clusters 10-18 @ 09:01  .Blood BLOOD   No growth at 48 Hours -- 10-18 @ 07:32

## 2024-10-21 NOTE — CONSULT NOTE ADULT - SUBJECTIVE AND OBJECTIVE BOX
CHIEF COMPLAINT:    HPI:  ICU admit note    S:    Pt seen and examined  54-year-old patient with past medical history for antiphospholipid syndrome, end-stage renal disease on dialysis Monday Wednesday Friday, acute embolism and thrombosis of the right internal jugular vein, high blood pressure, stroke, endocarditis, high cholesterol, presents emergency department for hypoxia, hypotension, less responsiveness.  Patient is awake,  soft  smoking and  answers few questions.  Patient currently with no pain  .  [place] [facility] called and informed that patient was found this morning satting 60%, blood pressure 87/55 and low temperature of 97.2.  Patient was sent to the ER for further evaluation.  Patient is a DNR, but   Not DNI    ICU called for consult    10/18: Hypotensive despite 1L IVF; add'l ordered, ESRD noted. Pressors ordered. Workup in progress.  (18 Oct 2024 11:35)    Consulted for abnormal echo. EF normal but small areas of regional wall motion abnormalities noted.  pt is not answering questioning on my history.  Per ICU team this is her baseline.      PAST MEDICAL AND SURGICAL HISTORY:  PAST MEDICAL & SURGICAL HISTORY:  ESRD on dialysis      History of stroke          ALLERGIES:  Allergies    No Known Allergies    Intolerances        SOCIAL HISTORY:  Social History:      FAMILY  HISTORY:  FAMILY HISTORY:      MEDICATIONS:  OUTPATIENT:  Home Medications:  busPIRone 10 mg oral tablet: 1 tab(s) orally 2 times a day (18 Oct 2024 11:16)  lacosamide 150 mg oral tablet: 1 tab(s) orally 2 times a day (18 Oct 2024 11:16)  Lexapro 20 mg oral tablet: 1 tab(s) orally once a day (18 Oct 2024 11:16)  Lipitor 40 mg oral tablet: 1 tab(s) orally once a day (at bedtime) (18 Oct 2024 11:16)  metoprolol succinate 25 mg oral capsule, extended release: 1 cap(s) orally once a day (18 Oct 2024 11:16)  miconazole 2% topical ointment: Apply topically to affected area 2 times a day apply to groin, perinium, buttocks (18 Oct 2024 11:16)  NIFEdipine (Eqv-Adalat CC) 30 mg oral tablet, extended release: 1 tab(s) orally once a day (18 Oct 2024 11:16)  Plaquenil 200 mg oral tablet: 2 tab(s) orally once a day (18 Oct 2024 11:16)  Polyethylene Glycol 3350: 17 billion vector genomes orally once a day (18 Oct 2024 11:16)  Protonix 40 mg oral delayed release tablet: 1 tab(s) orally once a day (18 Oct 2024 11:16)  Santyl 250 units/g topical ointment: Apply topically to affected area once a day apply to sacrum (18 Oct 2024 11:16)  senna (sennosides) 8.6 mg oral tablet: 1 tab(s) orally once a day (18 Oct 2024 11:16)  traMADol 50 mg oral tablet: 1 tab(s) orally every 8 hours as needed for  severe pain (18 Oct 2024 11:16)  traZODone 100 mg oral tablet: 1 tab(s) orally once a day (at bedtime) (18 Oct 2024 11:16)  Tylenol Caplet 325 mg oral tablet: 2 tab(s) orally every 6 hours as needed for fever and moderate pain (18 Oct 2024 11:16)  warfarin 6 mg oral tablet: 1 tab(s) orally once a day (18 Oct 2024 11:16)  Xanax 0.5 mg oral tablet: 1 tab(s) orally once a day (18 Oct 2024 11:16)      INPATIENT:  MEDICATIONS  (STANDING):  busPIRone 10 milliGRAM(s) Oral two times a day  chlorhexidine 4% Liquid 1 Application(s) Topical <User Schedule>  collagenase Ointment 1 Application(s) Topical daily  escitalopram 20 milliGRAM(s) Oral daily  heparin  Infusion. 900 Unit(s)/Hr (9 mL/Hr) IV Continuous <Continuous>  lacosamide 150 milliGRAM(s) Oral two times a day  pantoprazole  Injectable 40 milliGRAM(s) IV Push every 12 hours    MEDICATIONS  (PRN):  haloperidol    Injectable 5 milliGRAM(s) IV Push every 6 hours PRN agitation  heparin   Injectable 2500 Unit(s) IV Push every 6 hours PRN For aPTT between 40 - 57  heparin   Injectable 5500 Unit(s) IV Push every 6 hours PRN For aPTT less than 40  LORazepam   Injectable 1 milliGRAM(s) IV Push every 6 hours PRN Agitation    MEDICATIONS  (PRN):  haloperidol    Injectable 5 milliGRAM(s) IV Push every 6 hours PRN agitation  heparin   Injectable 2500 Unit(s) IV Push every 6 hours PRN For aPTT between 40 - 57  heparin   Injectable 5500 Unit(s) IV Push every 6 hours PRN For aPTT less than 40  LORazepam   Injectable 1 milliGRAM(s) IV Push every 6 hours PRN Agitation      REVIEW OF SYSTEMS:  ===============================  ===============================    Vital Signs Last 24 Hrs  T(C): 37.1 (21 Oct 2024 16:18), Max: 37.2 (21 Oct 2024 00:08)  T(F): 98.8 (21 Oct 2024 16:18), Max: 99 (21 Oct 2024 00:08)  HR: 91 (21 Oct 2024 20:00) (88 - 115)  BP: 112/92 (21 Oct 2024 20:00) (95/36 - 123/55)  BP(mean): 100 (21 Oct 2024 20:00) (52 - 100)  RR: 21 (21 Oct 2024 20:00) (11 - 27)  SpO2: 96% (21 Oct 2024 20:00) (94% - 100%)    Parameters below as of 21 Oct 2024 20:00  Patient On (Oxygen Delivery Method): room air        I&O's Summary    20 Oct 2024 07:01  -  21 Oct 2024 07:00  --------------------------------------------------------  IN: 541.2 mL / OUT: 0 mL / NET: 541.2 mL    21 Oct 2024 07:01  -  21 Oct 2024 21:01  --------------------------------------------------------  IN: 526 mL / OUT: 0 mL / NET: 526 mL        I&O's Detail    20 Oct 2024 07:01  -  21 Oct 2024 07:00  --------------------------------------------------------  IN:    Heparin Infusion: 146 mL    IV PiggyBack: 50 mL    Norepinephrine: 105.2 mL    Oral Fluid: 240 mL  Total IN: 541.2 mL    OUT:  Total OUT: 0 mL    Total NET: 541.2 mL      21 Oct 2024 07:01  -  21 Oct 2024 21:01  --------------------------------------------------------  IN:    Heparin Infusion: 63 mL    IV PiggyBack: 113 mL    PRBCs (Packed Red Blood Cells): 350 mL  Total IN: 526 mL    OUT:  Total OUT: 0 mL    Total NET: 526 mL          PHYSICAL EXAM:    Constitutional: NAD, awake  HEENT: PERR, EOMI,  No oral cyananosis.  Neck:  supple,  No JVD  Respiratory: Breath sounds are clear bilaterally, No wheezing, rales or rhonchi  Cardiovascular: S1 and S2, regular rate and rhythm, no Murmurs, gallops or rubs  Gastrointestinal: Bowel Sounds present, soft, nontender.   Extremities: No peripheral edema. No clubbing or cyanosis.  Vascular: 2+ peripheral pulses  Neurological: A/O x O  Musculoskeletal: no calf tenderness.  Skin: No rashes.    ===============================  ===============================  LABS:                         8.4    10.32 )-----------( 155      ( 21 Oct 2024 16:00 )             26.5                         7.0    9.04  )-----------( 157      ( 21 Oct 2024 08:23 )             22.9                         7.1    11.86 )-----------( 154      ( 20 Oct 2024 23:28 )             23.0     21 Oct 2024 08:23    141    |  106    |  34     ----------------------------<  97     3.6     |  25     |  5.75   20 Oct 2024 06:47    138    |  102    |  25     ----------------------------<  87     3.5     |  26     |  4.26   19 Oct 2024 05:42    139    |  102    |  53     ----------------------------<  125    3.7     |  27     |  6.11     Ca    11.2       21 Oct 2024 08:23  Ca    11.6       20 Oct 2024 06:47  Ca    11.4       19 Oct 2024 05:42  Phos  3.1       21 Oct 2024 08:23  Phos  3.1       20 Oct 2024 06:47  Phos  5.3       19 Oct 2024 05:42  Mg     1.8       21 Oct 2024 08:23  Mg     1.7       20 Oct 2024 06:47  Mg     2.0       19 Oct 2024 05:42    TPro  7.1    /  Alb  2.1    /  TBili  0.6    /  DBili  x      /  AST  29     /  ALT  27     /  AlkPhos  90     21 Oct 2024 08:23  TPro  7.6    /  Alb  2.1    /  TBili  0.8    /  DBili  x      /  AST  32     /  ALT  35     /  AlkPhos  101    20 Oct 2024 06:47    PTT - ( 21 Oct 2024 16:00 )  PTT:48.3 sec    THYROID STUDIES:    ===============================  ===============================  CARDIAC BIOMARKERS:  -------  -BNP VALUES:  - BNP:   -------  -TROPONIN VALUES:   Troponin I, High Sensitivity Result: 174.25 ng/L *H* (10-21-24 @ 08:23)  Troponin I, High Sensitivity Result: 281.43 ng/L *H* (10-19-24 @ 05:42)  Troponin I, High Sensitivity Result: 85.68 ng/L *H* (10-18-24 @ 07:32)    ===============================  ===============================  EKG:  sinus tachycardia, septal infarct, nonspecific T wave inversions laterally.        < from: TTE W or WO Ultrasound Enhancing Agent (10.18.24 @ 13:20) >  CONCLUSIONS:      1. Left ventricular cavity is small. Left ventricular systolic function is normal with an ejection fraction of 51 % by 3D   with an ejection fraction visually estimated at 50 to 55 %. Regional wall motion abnormalities present.   2. Mid and apical anterior septum and mid and apical inferior septum are abnormal.   3. There is mild (grade 1) left ventricular diastolic dysfunction.   4. Normal right ventricular cavity size, with normal wall thickness, and normal right ventricular systolic function.   5. Left atrium is mildly dilated.   6. Mild mitral regurgitation.   7. Mild tricuspid regurgitation.   8. Estimated pulmonary artery systolic pressure is 35 mmHg, consistent with borderline pulmonary hypertension.   9. Mild mitral valve stenosis.  10. There is mild calcification of the mitral valve annulus.  11. Trileaflet aortic valve with normal systolic excursion. There is minimal thickening of the aortic valve leaflets.    < end of copied text >

## 2024-10-21 NOTE — PROGRESS NOTE ADULT - ASSESSMENT
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), ESRD on HD M/W/F, R IJ VTE, CVA, HTN, recent discharge [facility] for MRSA Endocarditis on Vancomycin via PICC line (last day 10/18) presents from [facility] 10/18 for hypoxia, hypotension and lethargy  Afebrile on admission, on NRB > 4L NC, Hypotensive on pressors  WBC 12  Cr 6  UA negative, RVP negative  CTA without PE, R subclavian artery stent thrombosis  CTAP grossly negative for infectious etiology  TTE without obvious vegetation   BCx 10/18 Staph hominis and Staph capitis, CoNS 2 out of 4 bottles  BCx 10/20 negative    Prior Culture:  BCx 9/2 MRSA 2 out of 4  BCx 9/4 negative  BCx 9/24 negative    Antimicrobials:  Vancomycin 1000mg x1 (10/18) Dapto (10/19 --- )  cefepime  Injectable. 1000 every 24 hours (10/18 --- )    Impression:   #Acute Hypoxic Respiratory Failure  #Hypotension, Lethargy  #CoNS in Blood culture  #Recent MRSA Endocarditis   #ESRD on HD  #Sacral Ulcer  - remains afebrile, limited ROS, AAOx1 currently, unable to provide further history  - No other information on chart aside from enddate 10/18/2024 of Vanco for endocarditis, though EMR does show that MRSA BCx positive 9/2 with subsequent clearance 9/5 and 9/24  - Blood culture with 2 CoNS, likely contaminant, repeat BCx negative  - no obvious source of infection so far    Recommendations:  - discontinue Dapto and Cefepime  - monitor off antibiotic  - monitor temperature curve  - trend WBC  - side effects of antibiotic discussed, tolerating abx well so far  - Wound care     Clinical team may change from intravenous to oral antibiotics when the following criteria are met:   1. Patient is clinically improving/stable       a)	Improved signs and symptoms of infection from initial presentation       b)	Afebrile for 24 hours       c)	Leukocytosis trending towards normal range   2. Patient is tolerating oral intake   3. Initial/repeat blood cultures are negative OR do not need to wait for preliminary blood cultures to result    N/A

## 2024-10-21 NOTE — CONSULT NOTE ADULT - SUBJECTIVE AND OBJECTIVE BOX
HPI: Pt is a 54-year-old female with past medical history for antiphospholipid syndrome, end-stage renal disease on dialysis , acute embolism and thrombosis of the right internal jugular vein, high blood pressure, stroke, endocarditis, high cholesterol, presents emergency department for hypoxia, hypotension, less responsiveness, satting 60%, hypotensive at [facility]. Patient is currently admitted to ICU for acute septic shock in setting acute hypoxic respiratory failure. HCP is listed as Toribio Villalobos. Palliative medicine is consulted for further assistance with GOC.     Examined pt at bedside, awake, NAD, intermittently has outbursts, however unable to communicate. Pt currently off of pressor support.     Chart reviewed, pt has no HCP paperwork on file, MOLST that indicates DNR/ok to intubate - however form is incomplete, without valid signatures/witness.    Spoke with ICU MD and SW - attempting to obtain copy of HCP from [facility] which was unsuccessful, also reaching out to [facility].     PAIN: unable to obtain     DYSPNEA: unable to obtain     PAST MEDICAL & SURGICAL HISTORY:  ESRD on dialysis      History of stroke          SOCIAL HX:    Hx opiate tolerance ( )YES  ( x)NO    Baseline ADLs  (Prior to Admission)  ( ) Independent   (x )Dependent    FAMILY HISTORY:      Review of Systems: Unable to obtain      PHYSICAL EXAM:    Vital Signs Last 24 Hrs  T(C): 36.7 (21 Oct 2024 11:15), Max: 37.2 (21 Oct 2024 00:08)  T(F): 98 (21 Oct 2024 11:15), Max: 99 (21 Oct 2024 00:08)  HR: 104 (21 Oct 2024 11:30) (91 - 115)  BP: 105/54 (21 Oct 2024 11:30) (64/17 - 123/55)  BP(mean): 70 (21 Oct 2024 11:30) (30 - 96)  RR: 12 (21 Oct 2024 11:30) (11 - 27)  SpO2: 99% (21 Oct 2024 11:30) (94% - 100%)    Parameters below as of 21 Oct 2024 10:00  Patient On (Oxygen Delivery Method): room air      Daily     Daily Weight in k.9 (21 Oct 2024 05:56)    PPSV2:  40 %  FAST:    General: awake, NAD, encephalopathic   Lungs: diminished breath sounds   Cardiac: regular rate and rhythm   GI: soft, nontender, bs present   Ext: well perfused       LABS:                        7.0    9.04  )-----------( 157      ( 21 Oct 2024 08:23 )             22.9     10-21    141  |  106  |  34[H]  ----------------------------<  97  3.6   |  25  |  5.75[H]    Ca    11.2[H]      21 Oct 2024 08:23  Phos  3.1     10-21  Mg     1.8     10-21    TPro  7.1  /  Alb  2.1[L]  /  TBili  0.6  /  DBili  x   /  AST  29  /  ALT  27  /  AlkPhos  90  10-21    PTT - ( 21 Oct 2024 08:23 )  PTT:58.4 sec  Albumin: Albumin: 2.1 g/dL (10-21 @ 08:23)      Allergies    No Known Allergies    Intolerances      MEDICATIONS  (STANDING):  busPIRone 10 milliGRAM(s) Oral two times a day  cefepime  Injectable. 1000 milliGRAM(s) IV Push every 24 hours  chlorhexidine 2% Cloths 1 Application(s) Topical <User Schedule>  collagenase Ointment 1 Application(s) Topical daily  DAPTOmycin IVPB 550 milliGRAM(s) IV Intermittent every 48 hours  escitalopram 20 milliGRAM(s) Oral daily  heparin  Infusion. 900 Unit(s)/Hr (9 mL/Hr) IV Continuous <Continuous>  lacosamide IVPB 150 milliGRAM(s) IV Intermittent every 12 hours  pantoprazole  Injectable 40 milliGRAM(s) IV Push every 12 hours    MEDICATIONS  (PRN):  haloperidol    Injectable 5 milliGRAM(s) IV Push every 6 hours PRN agitation  heparin   Injectable 2500 Unit(s) IV Push every 6 hours PRN For aPTT between 40 - 57  heparin   Injectable 5500 Unit(s) IV Push every 6 hours PRN For aPTT less than 40  LORazepam   Injectable 1 milliGRAM(s) IV Push every 6 hours PRN Agitation      RADIOLOGY/ADDITIONAL STUDIES:

## 2024-10-21 NOTE — CONSULT NOTE ADULT - CONVERSATION DETAILS
"Palliative team reached out to pt's emergency contact/significant other Toribio Villalobos to introduce team and offer support. Palliative role explained. Dr. Spencer met with patient this morning who appeared alert, confused and unable to participate in discussion. Toribio explains that pt has been in and out of the hospital for approx 1.5 years. She has been residing at [facility] for the last 4 months. He explains that she has been having issues with her HD port at [place]. He shares that pt does not have children, that her parents have passed away and that he is her only family.  He expressed how it has been difficult to assist her as he is not her blood relative. He reports that she had completed a HCP in the past and that he was named the primary agent, Unit BRADY Brewster is attempting to obtain a copy from [facility] where she has been in the past.     Team inqruied about pt's wishes. Toribio shares that he and pt have had this conversation in the past. He reports that she had stated that she would not want CPR and would not want to live long-term on a ventilator but would be okay with a trial. MOLST completed to reflect DNR and allow TRIAL OF INTUBATION. Because patient does not have capacity to make decisions at this time and has no known family, Toribio is surrogate decision-maker based on the Family Health Care Decisions Act (FHCDA).     Emotional support provided. MOLST completed. Our team will continue to follow."

## 2024-10-21 NOTE — PROGRESS NOTE ADULT - SUBJECTIVE AND OBJECTIVE BOX
NEPHROLOGY INTERVAL HPI/OVERNIGHT EVENTS:  10-21-24 @ 11:04    10/21 1unit prbc, no sob, comfortable   10/20 + melena for CT with ivc   10/19 seen at HD, alert awake   HPI:  54-year-old patient with past medical history for antiphospholipid syndrome, end-stage renal disease on dialysis , acute embolism and thrombosis of the right internal jugular vein, high blood pressure, stroke, endocarditis, high cholesterol, presents emergency department for hypoxia, hypotension, less responsiveness.  Patient is awake,  soft  smoking and  answers few questions.  Patient currently with no pain  .  [facility] called and informed that patient was found this morning satting 60%, blood pressure 87/55 and low temperature of 97.2.  Patient was sent to the ER for further evaluation.  Patient is a DNR, but   Not DNI  ICU called for consult  10/18: Hypotensive despite 1L IVF; add'l ordered, ESRD noted. Pressors ordered. Workup in progress.  (18 Oct 2024 11:35)  Patient is a 54y old  Female who presents with a chief complaint of Shock (18 Oct 2024 11:35)  ==============================================================================  NEPHROLOGY CONSULT  hx from chart and reivew of transfer document   esrd at [facility] on daily hd m-f   on MRSA endocard tx on vanc 500 mg end date 10/18 via left PICC line   admitted for shock with hypoxia   s/p CTA and a/p await results  on levofed   abg pending, sa02 not recordable   lethargic   has hx of APL on coumadin, with SLE on plaquenil   AVF left arm with + thrill and bruit  CCM input noted, unable to identify HCP       PAST MEDICAL & SURGICAL HISTORY:  - esrd mtwthf [facility] home dialysis   - MRSA endocarditis on vanc until 10/18   - APL on coumadin   - CVA with dysphagia   ESRD on dialysis        MEDICATIONS  (STANDING):  busPIRone 10 milliGRAM(s) Oral two times a day  cefepime  Injectable. 1000 milliGRAM(s) IV Push every 24 hours  chlorhexidine 2% Cloths 1 Application(s) Topical <User Schedule>  collagenase Ointment 1 Application(s) Topical daily  DAPTOmycin IVPB 550 milliGRAM(s) IV Intermittent every 48 hours  escitalopram 20 milliGRAM(s) Oral daily  heparin  Infusion. 900 Unit(s)/Hr (9 mL/Hr) IV Continuous <Continuous>  lacosamide IVPB 150 milliGRAM(s) IV Intermittent every 12 hours  pantoprazole  Injectable 40 milliGRAM(s) IV Push every 12 hours    MEDICATIONS  (PRN):  haloperidol    Injectable 5 milliGRAM(s) IV Push every 6 hours PRN agitation  heparin   Injectable 2500 Unit(s) IV Push every 6 hours PRN For aPTT between 40 - 57  heparin   Injectable 5500 Unit(s) IV Push every 6 hours PRN For aPTT less than 40  LORazepam   Injectable 1 milliGRAM(s) IV Push every 6 hours PRN Agitation      Allergies    No Known Allergies    Intolerances        I&O's Detail    20 Oct 2024 07:01  -  21 Oct 2024 07:00  --------------------------------------------------------  IN:    Heparin Infusion: 146 mL    IV PiggyBack: 50 mL    Norepinephrine: 105.2 mL    Oral Fluid: 240 mL  Total IN: 541.2 mL    OUT:  Total OUT: 0 mL    Total NET: 541.2 mL    Vital Signs Last 24 Hrs  T(C): 37.2 (21 Oct 2024 05:56), Max: 37.2 (21 Oct 2024 00:08)  T(F): 98.9 (21 Oct 2024 05:56), Max: 99 (21 Oct 2024 00:08)  HR: 103 (21 Oct 2024 10:00) (91 - 115)  BP: 106/58 (21 Oct 2024 10:00) (64/17 - 123/55)  BP(mean): 71 (21 Oct 2024 10:00) (30 - 96)  RR: 15 (21 Oct 2024 10:00) (11 - 27)  SpO2: 94% (21 Oct 2024 10:00) (94% - 100%)    Parameters below as of 21 Oct 2024 10:00  Patient On (Oxygen Delivery Method): room air      Daily     Daily Weight in k.9 (21 Oct 2024 05:56)    PHYSICAL EXAM:  General: alert. awake NAD  HEENT: MMM  CV: s1s2 rrr  LUNGS: B/L CTA  EXT: no edema    LABS:                        7.0    9.04  )-----------( 157      ( 21 Oct 2024 08:23 )             22.9     10-21    141  |  106  |  34[H]  ----------------------------<  97  3.6   |  25  |  5.75[H]    Ca    11.2[H]      21 Oct 2024 08:23  Phos  3.1     10-21  Mg     1.8     10-21    TPro  7.1  /  Alb  2.1[L]  /  TBili  0.6  /  DBili  x   /  AST  29  /  ALT  27  /  AlkPhos  90  10-21    PTT - ( 21 Oct 2024 08:23 )  PTT:58.4 sec  Urinalysis Basic - ( 21 Oct 2024 08:23 )    Color: x / Appearance: x / SG: x / pH: x  Gluc: 97 mg/dL / Ketone: x  / Bili: x / Urobili: x   Blood: x / Protein: x / Nitrite: x   Leuk Esterase: x / RBC: x / WBC x   Sq Epi: x / Non Sq Epi: x / Bacteria: x      Magnesium: 1.8 mg/dL (10-21 @ 08:23)  Phosphorus: 3.1 mg/dL (10-21 @ 08:23)

## 2024-10-21 NOTE — PROGRESS NOTE ADULT - CARDIOVASCULAR
Results are normal. Continue with current management if on medications. Continue with lifestyle changes. regular rate and rhythm/S1 S2 present/no pedal edema negative

## 2024-10-21 NOTE — PROGRESS NOTE ADULT - ASSESSMENT
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) via lefr arm picc  rt AVF   hypercalcemia   SHock r/o sepsis     REC  - no urgent indication for hd now   - no identifiable reachable next of kin/hcp will plan for 2PC for HD consent   - rt AVF with + briuit, weak thrill   - hypercalcemia work-up including spep and upep   - fu pt/inr, pt may require temp hd catheter     coumadin on hold until results available   - pressor support with levophed  - s/p CTA with A/P await results  - echo   - abg   - fu cx send   dw RENITA Bedolla and Dr White    10/19 MK   - ESRD     hd today and will attempt uf with hd     via avf   - APL no now on iv heparin    rt subclavian clot     hx of cva with aphasia   - GPC sepsis     dapto and cefepime as per ID, renally dosed     fu cx     trend of outpt cx noted from id noted in sept cleared bacteremia     TTE no vegetation     ? need to dc PICC line   dw dr white and ccu team     10/20 MK   - ESRD      likely tts while inpt, sooner if clinically indicated     via avf, fx well with hd yesterday   - APL no now on iv heparin    rt subclavian clot     hx of cva with aphasia   - melena for hold of heparin     no renal barrier for IV contrast   - GPC sepsis     dapto and cefepime as per ID, renally dosed     fu cx     trend of outpt cx noted from id noted in sept cleared bacteremia     TTE no vegetation     ? need to dc PICC line   dw dr white and ccu team     10/21 Mk   - ESRD tts, AVF   - APL no now on iv heparin    rt subclavian clot     hx of cva with aphasia   - ? melena    gi input noted, brown stool     fu trend of h/h  - GPC / staph hominis sepsis     dapto and cefepime as per ID, renally dosed     fu repeat cx 10/20    trend of outpt cx noted from id noted in sept cleared bacteremia     TTE no vegetation   dw dr baca and  ccu team

## 2024-10-21 NOTE — PROGRESS NOTE ADULT - SUBJECTIVE AND OBJECTIVE BOX
Chief Complaint: Patient is a 54y old  Female who presents with a chief complaint of Shock (21 Oct 2024 13:06)      Interval events:   - No acute events overnight, VSS, pt afebrile   - Pt downgraded out of CCU to floors today, chart reviewed, BP now stable without pressors  - Hgb 7.0 today receiving 1U PRBC    ROS:   Patient denies any current chest pain, SOB, cough, f/c/n/v/d, abd pain, LE edema, myalgias, dysuria, HA, dizziness  All other review of systems is negative unless indicated above    Physical Exam:  Vital Signs Last 24 Hrs  T(C): 36.7 (21 Oct 2024 11:15), Max: 37.2 (21 Oct 2024 00:08)  T(F): 98 (21 Oct 2024 11:15), Max: 99 (21 Oct 2024 00:08)  HR: 95 (21 Oct 2024 13:00) (91 - 115)  BP: 111/71 (21 Oct 2024 13:00) (71/59 - 123/55)  BP(mean): 84 (21 Oct 2024 13:00) (52 - 96)  RR: 22 (21 Oct 2024 13:00) (11 - 27)  SpO2: 95% (21 Oct 2024 13:00) (94% - 100%)    Parameters below as of 21 Oct 2024 12:00  Patient On (Oxygen Delivery Method): room air    Constitutional: NAD, awake and alert  HEENT: PERRLA, EOMI, MMM  Respiratory: Breath sounds are clear bilaterally, No wheezing, rales or rhonchi  Cardiovascular: S1 and S2, RRR, no murmurs, gallops or rubs  Gastrointestinal: +BS, soft, non-tender, non-distended, no CVA tenderness  Extremities: No peripheral edema, +DP pulses b/l  Neurological: A&O x 1-2, no focal deficits  Musculoskeletal: 5/5 strength b/l upper and lower extremities  Skin: Normal, skin warm and dry    Labs:  10-21 @ 08:23  Glucose 97 mg/dL  HCO3 25 mmol/L  Chloride 106 mmol/L  Sodium 141 mmol/L>   Potassium 3.6 mmol/L  Creatinine 5.75 mg/dL  Calcium 11.2 mg/dL  BUN 34 mg/dL  eGFR 8 mL/min/1.73m2  Anion gap 10 mmol/L    WBC 9.04  Hemoglobin 7.0  Hemoatocrit 22.9  Platelet count 157    PTT - ( 21 Oct 2024 08:23 )  PTT:58.4 sec    Cardiac testing:  Troponin I, High Sensitivity Result: 174.25 ng/L (10-21-24 @ 08:23)  Troponin I, High Sensitivity Result: 281.43 ng/L (10-19-24 @ 05:42)  Troponin I, High Sensitivity Result: 85.68 ng/L (10-18-24 @ 07:32)    12 Lead ECG:   Ventricular Rate 108 BPM    Atrial Rate 108 BPM    P-R Interval 162 ms    QRS Duration 84 ms    Q-T Interval 354 ms    QTC Calculation(Bazett) 474 ms    P Axis 19 degrees    R Axis 20 degrees    T Axis 127 degrees    Diagnosis Line Sinus tachycardia  Septal infarct , age undetermined  T wave abnormality, consider lateral ischemia  Abnormal ECG  No previous ECGs available  Confirmed by MD KATHIE, THERESA ([id number]) on 10/18/2024 9:15:05 PM (10-18-24 @ 08:40)    Micro:  UA 10/18 -- Trace LE, 8 WBCs, +proteinuria   BCx 10/18 x1 -- coagulase negative staph   BCx 10/20 x2 -- NGTD    Radiology:  < from: Xray Chest 1 View- PORTABLE-Urgent (10.18.24 @ 09:46) >  IMPRESSION: No acute finding.      < from: CT Head No Cont (10.18.24 @ 10:03) >  IMPRESSION:  No acute intracranial  hemorrhage or midline shift.    Right frontal temporal encephalomalacia/gliosis.    Extensive white matter small vessel ischemic disease/chronic infarct.    Chronic left thalamic infarct.    Generalized volume loss with ventricular dilatation.      < from: CT Chest No Cont (10.18.24 @ 10:03) >  IMPRESSION:  No CT evidence of pneumonia.      < from: CT Angio Chest PE Protocol w/ IV Cont (10.18.24 @ 12:40) >  IMPRESSION:  No pulmonary embolism.    Focal nonopacification of a portion of the right subclavian artery stent,   suspicious for thrombus. Correlate with Doppler imaging.    Enlarged myomatous uterus, with large pedunculated 14cm fibroid extending   up to the left hemiabdomen.  Recommend GYN evaluation.      < from: US Duplex Arterial Upper Ext Ltd, Right (10.18.24 @ 16:04) >  IMPRESSION:  Occluded or almost occluded medial portion of the right subclavian vein   stent.    < from: TTE W or WO Ultrasound Enhancing Agent (10.18.24 @ 13:20) >   1. Left ventricular cavity is small. Left ventricular systolic function is normal with an ejection fraction of 51 % by 3D with an ejection fraction visually estimated at 50 to 55 %. Regional wall motion abnormalities present.   2. Mid and apical anterior septum and mid and apical inferior septum are abnormal.   3. There is mild (grade 1) left ventricular diastolic dysfunction.   4. Normal right ventricular cavity size, with normal wall thickness, and normal right ventricular systolic function.   5. Left atrium is mildly dilated.   6. Mild mitral regurgitation.   7. Mild tricuspid regurgitation.   8. Estimated pulmonary artery systolic pressure is 35 mmHg, consistent with borderline pulmonary hypertension.   9. Mild mitral valve stenosis.  10. There is mild calcification of the mitral valve annulus.  11. Trileaflet aortic valve with normal systolic excursion. There is minimal thickening of the aortic valve leaflets.    < end of copied text >      Medications:  MEDICATIONS  (STANDING):  busPIRone 10 milliGRAM(s) Oral two times a day  chlorhexidine 4% Liquid 1 Application(s) Topical <User Schedule>  collagenase Ointment 1 Application(s) Topical daily  escitalopram 20 milliGRAM(s) Oral daily  heparin  Infusion. 900 Unit(s)/Hr (9 mL/Hr) IV Continuous <Continuous>  lacosamide 150 milliGRAM(s) Oral two times a day  pantoprazole  Injectable 40 milliGRAM(s) IV Push every 12 hours    MEDICATIONS  (PRN):  haloperidol    Injectable 5 milliGRAM(s) IV Push every 6 hours PRN agitation  heparin   Injectable 2500 Unit(s) IV Push every 6 hours PRN For aPTT between 40 - 57  heparin   Injectable 5500 Unit(s) IV Push every 6 hours PRN For aPTT less than 40  LORazepam   Injectable 1 milliGRAM(s) IV Push every 6 hours PRN Agitation

## 2024-10-21 NOTE — GOALS OF CARE CONVERSATION - ADVANCED CARE PLANNING - CONVERSATION DETAILS
See H&P
Patient had prior MOLST with DNR, discussed GOC with HCP Ricky - he confirmed DNR
HPI: As per medical record,   54-year-old patient with past medical history for antiphospholipid syndrome, end-stage renal disease on dialysis Monday Wednesday Friday, acute embolism and thrombosis of the right internal jugular vein, high blood pressure, stroke, endocarditis, high cholesterol, presents emergency department for hypoxia, hypotension, less responsiveness.  Patient is awake,  soft  smoking and  answers few questions.  Patient currently with no pain. [facility] called and informed that patient was found this morning satting 60%, blood pressure 87/55 and low temperature of 97.2.  Patient was sent to the ER for further evaluation.  Patient is a DNR, but   Not DNI. 10/18: Hypotensive despite 1L IVF; add'l ordered, ESRD noted. Pressors ordered. Workup in progress.  (18 Oct 2024 11:35)      PERTINENT PMH REVIEWED:  [ x ] YES [ ] NO           Primary Contact: significant other Toribio Villalobos ([phone])    HCP [  ] Surrogate [ x  ] Guardian [   ]    Mental Status: [ x ] Alert  [  ] Oriented [ x ] Confused [  ] Lethargic   Concerns of Depression [  ] unable to assess  Anxiety [   ] unable to assess  Baseline ADLs (prior to admission):  Independent [ ] moderately [ ] fully   Dependent   [ ] moderately [ x ]fully    Family Meeting attendees: Pt's sig other Toribio participated in initial discussion     Anticipated Grief: Patient[  ] Family [  ]    Caregiver Cary Assessed: Yes [ x ] No [  ]    Baptism: Not identified    Spiritual Concerns: None reported.  available PRN    Goals of Care: Continue current medical management at this time    Previous Services: [facility]    ADVANCE DIRECTIVES:  [ x ] YES [ ] NO    - MOLST completed to reflect DNR, TRIAL OF INTUBATION    Anticipated D/C Plan: TBD                     Summary: Palliative team reached out to pt's emergency contact/significant other Toribio Villalobos to introduce team and offer support. Palliative role explained. Dr. Spencer met with patient this morning who appeared alert, confused and unable to participate in discussion. Toribio explains that pt has been in and out of the hospital for approx 1.5 years. She has been residing at [facility] for the last 4 months. He explains that she has been having issues with her HD port at [facility]. He shares that pt does not have children, that her parents have passed away and that he is her only family.  He expressed how it has been difficult to assist her as he is not her blood relative. He reports that she had completed a HCP in the past and that he was named the primary agent, [facility] Brewster is attempting to obtain a copy from [facility] where she has been in the past.     Team inqruied about pt's wishes. Toribio shares that he and pt have had this conversation in the past. He reports that she had stated that she would not want CPR and would not want to live long-term on a ventilator but would be okay with a trial. MOLST completed to reflect DNR and allow TRIAL OF INTUBATION. Because patient does not have capacity to make decisions at this time and has no known family, Toribio is surrogate decision-maker based on the Family Health Care Decisions Act (FHCDA).     Emotional support provided. MOLST completed. Our team will continue to follow.

## 2024-10-21 NOTE — PROGRESS NOTE ADULT - SUBJECTIVE AND OBJECTIVE BOX
Date of Service:10-21-24 @ 13:06  Interval History/ROS: Afebrile overnight. Patient assessed at bedside this morning. Limtied ROS, but denies any pain. Staph capitis and staph hominis on BCx likely contaminant    REVIEW OF SYSTEMS  [  x] ROS unobtainable because:  mental status  [  ] All other systems negative except as noted below    Constitutional:  [ ] fever [ ] chills  [ ] weight loss  [ ]night sweat  [ ]poor appetite/PO intake [ ]fatigue   Skin:  [ ] rash [ ] phlebitis	  Eyes: [ ] icterus [ ] pain  [ ] discharge	  ENMT: [ ] sore throat  [ ] thrush [ ] ulcers [ ] exudates [ ]anosmia  Respiratory: [ ] dyspnea [ ] hemoptysis [ ] cough [ ] sputum	  Cardiovascular:  [ ] chest pain [ ] palpitations [ ] edema	  Gastrointestinal:  [ ] nausea [ ] vomiting [ ] diarrhea [ ] constipation [ ] pain	  Genitourinary:  [ ] dysuria [ ] frequency [ ] hematuria [ ] discharge [ ] flank pain  [ ] incontinence  Musculoskeletal:  [ ] myalgias [ ] arthralgias [ ] arthritis  [ ] back pain  Neurological:  [ ] headache [ ] weakness [ ] seizures  [ ] confusion/altered mental status    Allergies  No Known Allergies        ANTIMICROBIALS:    cefepime  Injectable. 1000 every 24 hours  DAPTOmycin IVPB 550 every 48 hours        OTHER MEDS: MEDICATIONS  (STANDING):  busPIRone 10 two times a day  escitalopram 20 daily  haloperidol    Injectable 5 every 6 hours PRN  heparin   Injectable 2500 every 6 hours PRN  heparin   Injectable 5500 every 6 hours PRN  heparin  Infusion. 900 <Continuous>  lacosamide 150 two times a day  LORazepam   Injectable 1 every 6 hours PRN  pantoprazole  Injectable 40 every 12 hours      Vital Signs Last 24 Hrs  T(F): 98 (10-21-24 @ 11:15), Max: 99.7 (10-18-24 @ 20:03)    Vital Signs Last 24 Hrs  HR: 104 (10-21-24 @ 11:30) (91 - 115)  BP: 105/54 (10-21-24 @ 11:30) (71/59 - 123/55)  RR: 12 (10-21-24 @ 11:30)  SpO2: 99% (10-21-24 @ 11:30) (94% - 100%)  Wt(kg): --    EXAM:    Constitutional: NAD  Head/Eyes: no icterus  LUNGS:  crackles  CVS:  regular rhythm  Abd:  soft, non-tender; non-distended  Ext:  no edema  Vascular:  IV site no erythema tenderness or discharge  Neuro: AAO X 1    Labs:                        7.0    9.04  )-----------( 157      ( 21 Oct 2024 08:23 )             22.9     10-21    141  |  106  |  34[H]  ----------------------------<  97  3.6   |  25  |  5.75[H]    Ca    11.2[H]      21 Oct 2024 08:23  Phos  3.1     10-21  Mg     1.8     10-21    TPro  7.1  /  Alb  2.1[L]  /  TBili  0.6  /  DBili  x   /  AST  29  /  ALT  27  /  AlkPhos  90  10-21      WBC Trend:  WBC Count: 9.04 (10-21-24 @ 08:23)  WBC Count: 11.86 (10-20-24 @ 23:28)  WBC Count: 6.81 (10-20-24 @ 22:31)  WBC Count: 10.70 (10-20-24 @ 12:51)      Creatine Trend:  Creatinine: 5.75 (10-21)  Creatinine: 4.26 (10-20)  Creatinine: 6.11 (10-19)  Creatinine: 5.07 (10-18)      Liver Biochemical Testing Trend:  Alanine Aminotransferase (ALT/SGPT): 27 (10-21)  Alanine Aminotransferase (ALT/SGPT): 35 (10-20)  Alanine Aminotransferase (ALT/SGPT): 42 (10-18)  Aspartate Aminotransferase (AST/SGOT): 29 (10-21-24 @ 08:23)  Aspartate Aminotransferase (AST/SGOT): 32 (10-20-24 @ 06:47)  Aspartate Aminotransferase (AST/SGOT): 57 (10-18-24 @ 07:32)  Bilirubin Total: 0.6 (10-21)  Bilirubin Total: 0.8 (10-20)  Bilirubin Total: 0.6 (10-18)      Trend LDH      Urinalysis Basic - ( 21 Oct 2024 08:23 )    Color: x / Appearance: x / SG: x / pH: x  Gluc: 97 mg/dL / Ketone: x  / Bili: x / Urobili: x   Blood: x / Protein: x / Nitrite: x   Leuk Esterase: x / RBC: x / WBC x   Sq Epi: x / Non Sq Epi: x / Bacteria: x        MICROBIOLOGY:  Vancomycin Level, Trough: 24.1 (10-20 @ 06:47)        Culture - Blood (collected 20 Oct 2024 06:47)  Source: .Blood BLOOD  Preliminary Report:    No growth at 24 hours    Culture - Blood (collected 20 Oct 2024 06:47)  Source: .Blood BLOOD  Preliminary Report:    No growth at 24 hours    Culture - Blood (collected 18 Oct 2024 09:01)  Source: .Blood BLOOD  Final Report:    Growth in aerobic bottle: Staphylococcus hominis    Growth in anaerobic bottle: Staphylococcus capitis    Isolation of Coagulase negative Staphylococcus from single blood culture    sets may represent    contamination. Contact the Microbiology Department at [phone] if    susceptibility testing is needed.    clinically indicated.    Direct identification is available within approximately 3-5    hours either by Blood Panel Multiplexed PCR or Direct    MALDI-TOF. Details: [email or web address]  Organism: Blood Culture PCR  Organism: Blood Culture PCR    Sensitivities:      Method Type: PCR      -  Coagulase negative Staphylococcus: Detec    Culture - Blood (collected 18 Oct 2024 07:32)  Source: .Blood BLOOD  Preliminary Report:    No growth at 72 Hours    Urinalysis with Rflx Culture (collected 18 Oct 2024 07:30)      RADIOLOGY:  imaging below personally reviewed

## 2024-10-21 NOTE — PROGRESS NOTE ADULT - ASSESSMENT
Ms. Workman is a 54-year-old female with PMHx  of APLS, HTN,  HLD, CVA with residual aphasia, ESRD on dialysis 3 / week, thrombosis/embolism right IJ, recently treated for MRSA endocarditis at [facility], on Vancomycin via PICC line (last day 10/18), presented to [facility] ED from [facility] with less responsiveness, hypotension and hypoxia.  Patient awas dmitted to CCU, followed by nephrology for ESRD - creatinine 6.11, treated with pressors and for shock. Neurology consulted to rule out seizure as patient was noted to have slight stiffening of the extremities on 10/18 per intensivist Dr. White. Patient is on lacosamide 150 mg bid.     Patient is on Plaquenil ? lupus versus connective tissue disorder, she is also on Coumadin, INR was subtherapeutic, 1. 43 -> 1.34, is on IV heparin infusion.  There is no documented history of seizures, we are unable to reach any family to get more info. On exam, patient is awake and alert, she seems to comprehend, she tries to articulate but there is no verbal output, she tries to formulate words with her lips, she barely follows some instructions but not commands.  She moves both upper extremities well, she withdraws both lower extremities legs seem spastic.      # Possible history of seizures, with an episode suspicious for a breakthrough seizure.  -Was on lacosamide at [place], likely related to past history of seizures  -EEG has been attempted, but unable to perform due to lack of cooperation from patient.  -Currently on lacosamide 150 mg BID. Would not increase the dose at this time due to her creatinine clearance.  -Would monitor and if there is any additional suggestion of seizures, can add a second agent, possible valproic acid which may also help with agitation.  -Would avoid Tramadol for pain in patients with seizures as this medication is pro-convulsant.      # History of CVA with significant disabilities including aphasia, numerous other risk factors i.e. APLS, possible connective tissue disorder - INR subtherapeutic  -- Continue with IV heparin, bring INR > 2.5  - Attempt to obtain reports of prior studies; I do not expect patient to be able to tolerate MRI at this time, and we may be unable to fill out safety form.     Please call back if additional input is needed from neurology service.

## 2024-10-21 NOTE — PROGRESS NOTE ADULT - ASSESSMENT
55 y/o female PMH APS on warfarin, ESRD on HD, ?lupus on Plaquenil, CVA, right internal jugular vein thrombus, functional quadriplegia, seizure d/o on Vimpat, recent hospitalization at [facility] for MRSA endocarditis (finished vanc on 10/18), presented from [place] for hypotension, hypoxia and poor responsiveness/lethargy   Remained hypotensive in ED despite 2.5L IVF, started on pressors and admitted to CCU     #Hypotension/shock multifactorial - hypovolemic/medication   #Acute toxic encephalopathy likely due to meds/polypharmacy  #Acute hypoxic respiratory failure   - s/p CCU care requiring pressors for BP support, now off  - CTH negative for CVA/hemorrhage, notes Right frontal temporal encephalomalacia/gliosis, Extensive white matter small vessel ischemic disease/chronic left thalamic infarct.  - CTA chest negative for PE or PNA  - s/p supplemental O2, monitor pulse ox  - continue Haldol/Ativan PRN for agitation   - home BB + nifedipine held given shock, resume as BP allows  - PT evaluation    #Suspected GI bleed   #Acute blood loss anemia   - Hgb 9.6 -> 7.0 receiving PRBCs today   - GI consulted Dr. Galicia  - FOBT + but no active melena/hematochezia. No urgent need for invasive intervention per GI   - continue PPI BID  - continue to trend H/H, transfuse PRN    #APS on warfarin   #Occluded right subclavian vein stent   - holding Warfarin until H/H stable, continue hep gtt  - INR 1.43 on arrival, subtherapeutic   - Arterial duplex: Occluded or almost occluded medial portion of the right subclavian vein stent     #Elevated troponin  - Troponin 86 -> 281 -> 174  - TTE as above with preserved LVEF, grade 1 LV diastolic dysfunction, +regional WMAs present  - Cardiology consulted     #Recent MRSA endocarditis   - completed tx with Vanco via PICC on 10/18  - TTE without vegetations   - BCx 10/18 Staph hominis and Staph capitis, CoNS 2 out of 4 bottles  - BCx 10/20 negative  - ID consulted , BCx from 10/18 likely contaminant, IV abx discontinued. Monitor off abx     #ESRD on HD   - HD per renal     #Seizure d/o   #Hx CVA with dysphagia, functional quadriplegia   - continue vimpat   - Neurology consulted, unable to perform EEG due to lack of cooperation from pt  - soft and bite sized diet    #Code status   - palliative care following  - pt is DNR, OK to intubate     #DVT ppx   - on hep gtt

## 2024-10-21 NOTE — CONSULT NOTE ADULT - ASSESSMENT
Pt is a 54-year-old female with past medical history for antiphospholipid syndrome, end-stage renal disease on dialysis Monday Wednesday Friday, acute embolism and thrombosis of the right internal jugular vein, high blood pressure, stroke, endocarditis, high cholesterol, presents emergency department for hypoxia, hypotension, less responsiveness, satting 60%, hypotensive at [facility]. Patient is currently admitted to ICU for acute septic shock in setting acute hypoxic respiratory failure. HCP is listed as Toribio Villalobos. Palliative medicine is consulted for further assistance with GOC.     GOC/ACP  Capacity: Pt does not have capacity for medical decision making   HCP/Surrogate: Toribio Villalobos - pt's boyfriend, [phone] states he is HCP, SW currently attempting to obtain documentation from prior rehab [facility] or [facility]. Additionally, patient does not have any other family members, under [facility], Toribio Villanuevabard would be the surrogate decision maker.   Code Status: DNR/trial of NIV and/or intubation and medical ventilation   MOLST: completed 10/21/24  Dispo Plan: continue current treatment, ongoing GOC, likely return to [facility]       Process of Care  --Reviewed dx/treatment problems and alignment with Goals of Care    Physical Aspects of Care  --Pain  no current pain symptoms  continue current regimen     --Bowel Regimen  denies constipation  risk for constipation d/t immobility  goal of 1 soft BM daily or every other day  - prn suppository     --Dyspnea  No SOB at this time  comfortable and in NAD    --Nausea Vomiting  denies    --Weakness  PT as tolerated     Psychological and Psychiatric Aspects of Care:   --Greif/Bereavment: emotional support provided  --Hx of psychiatric dx: none  -Pastoral Care Available PRN     Social Aspects of Care  -SW involved     Cultural Aspects  -Primary Language: English    Goals of Care:     We discussed Palliative Care team being a supportive team when a patient has ongoing illnesses.  We also discussed that it is not an end of life care service, but can help navigate symptoms and emotional support througout their hospital stay here.      Ethical and Legal Aspects:   NA        Discussed With: Case coordinated with attending and SW and RN     Time Spent: 90 minutes including the care, coordination and counseling of this patient, 50% of which was spent coordinating and counseling.

## 2024-10-21 NOTE — CONSULT NOTE ADULT - TREATMENT GUIDELINE COMMENT
Coversation held via telephone as no family at bedside. S/o Toribio is unable to visit due to non-working vehicle.

## 2024-10-21 NOTE — CONSULT NOTE ADULT - PROBLEM SELECTOR RECOMMENDATION 9
-Echo w/ normal EF w/ apical anteroseptal & apical inferior WMA  -Trop mildly elevated likely in the setting of sepsis.  ECG w/ nonspecific changes.  -pt unable to provide history-h/o CVA & other issues w/ poor cognitive function.    -Trop elevation likely related to sepsis.  -pt is a poor candidate for ischemic workup.  -cont. to treat medically. If mental status improves will consider stress testing  but this could be deferred to outpatient setting.

## 2024-10-22 LAB
ADD ON TEST-SPECIMEN IN LAB: SIGNIFICANT CHANGE UP
ALBUMIN SERPL ELPH-MCNC: 2 G/DL — LOW (ref 3.3–5)
ANION GAP SERPL CALC-SCNC: 7 MMOL/L — SIGNIFICANT CHANGE UP (ref 5–17)
APTT BLD: 78.3 SEC — HIGH (ref 24.5–35.6)
APTT BLD: 98.2 SEC — HIGH (ref 24.5–35.6)
BLD GP AB SCN SERPL QL: SIGNIFICANT CHANGE UP
BUN SERPL-MCNC: 47 MG/DL — HIGH (ref 7–23)
CALCIUM SERPL-MCNC: 11.2 MG/DL — HIGH (ref 8.5–10.1)
CHLORIDE SERPL-SCNC: 105 MMOL/L — SIGNIFICANT CHANGE UP (ref 96–108)
CO2 SERPL-SCNC: 29 MMOL/L — SIGNIFICANT CHANGE UP (ref 22–31)
CREAT SERPL-MCNC: 7.14 MG/DL — HIGH (ref 0.5–1.3)
EGFR: 6 ML/MIN/1.73M2 — LOW
GLUCOSE SERPL-MCNC: 111 MG/DL — HIGH (ref 70–99)
HCT VFR BLD CALC: 24.3 % — LOW (ref 34.5–45)
HCT VFR BLD CALC: 26.9 % — LOW (ref 34.5–45)
HGB BLD-MCNC: 7.7 G/DL — LOW (ref 11.5–15.5)
HGB BLD-MCNC: 8.3 G/DL — LOW (ref 11.5–15.5)
MAGNESIUM SERPL-MCNC: 1.9 MG/DL — SIGNIFICANT CHANGE UP (ref 1.6–2.6)
MCHC RBC-ENTMCNC: 30.3 PG — SIGNIFICANT CHANGE UP (ref 27–34)
MCHC RBC-ENTMCNC: 30.9 GM/DL — LOW (ref 32–36)
MCHC RBC-ENTMCNC: 31.2 PG — SIGNIFICANT CHANGE UP (ref 27–34)
MCHC RBC-ENTMCNC: 31.7 GM/DL — LOW (ref 32–36)
MCV RBC AUTO: 98.2 FL — SIGNIFICANT CHANGE UP (ref 80–100)
MCV RBC AUTO: 98.4 FL — SIGNIFICANT CHANGE UP (ref 80–100)
MRSA PCR RESULT.: DETECTED
PHOSPHATE SERPL-MCNC: 4.2 MG/DL — SIGNIFICANT CHANGE UP (ref 2.5–4.5)
PLATELET # BLD AUTO: 154 K/UL — SIGNIFICANT CHANGE UP (ref 150–400)
PLATELET # BLD AUTO: 163 K/UL — SIGNIFICANT CHANGE UP (ref 150–400)
POTASSIUM SERPL-MCNC: 4 MMOL/L — SIGNIFICANT CHANGE UP (ref 3.5–5.3)
POTASSIUM SERPL-SCNC: 4 MMOL/L — SIGNIFICANT CHANGE UP (ref 3.5–5.3)
RBC # BLD: 2.47 M/UL — LOW (ref 3.8–5.2)
RBC # BLD: 2.74 M/UL — LOW (ref 3.8–5.2)
RBC # FLD: SIGNIFICANT CHANGE UP (ref 10.3–14.5)
RBC # FLD: SIGNIFICANT CHANGE UP (ref 10.3–14.5)
S AUREUS DNA NOSE QL NAA+PROBE: DETECTED
SODIUM SERPL-SCNC: 141 MMOL/L — SIGNIFICANT CHANGE UP (ref 135–145)
WBC # BLD: 10.03 K/UL — SIGNIFICANT CHANGE UP (ref 3.8–10.5)
WBC # BLD: 9.88 K/UL — SIGNIFICANT CHANGE UP (ref 3.8–10.5)
WBC # FLD AUTO: 10.03 K/UL — SIGNIFICANT CHANGE UP (ref 3.8–10.5)
WBC # FLD AUTO: 9.88 K/UL — SIGNIFICANT CHANGE UP (ref 3.8–10.5)

## 2024-10-22 PROCEDURE — 99233 SBSQ HOSP IP/OBS HIGH 50: CPT | Mod: FS

## 2024-10-22 PROCEDURE — 99233 SBSQ HOSP IP/OBS HIGH 50: CPT

## 2024-10-22 RX ORDER — MUPIROCIN 20 MG/G
1 OINTMENT TOPICAL
Refills: 0 | Status: COMPLETED | OUTPATIENT
Start: 2024-10-22 | End: 2024-10-26

## 2024-10-22 RX ORDER — HYDROMORPHONE HCL/0.9% NACL/PF 6 MG/30 ML
0.5 PATIENT CONTROLLED ANALGESIA SYRINGE INTRAVENOUS ONCE
Refills: 0 | Status: DISCONTINUED | OUTPATIENT
Start: 2024-10-22 | End: 2024-10-22

## 2024-10-22 RX ORDER — MIDODRINE HYDROCHLORIDE 2.5 MG/1
5 TABLET ORAL ONCE
Refills: 0 | Status: COMPLETED | OUTPATIENT
Start: 2024-10-22 | End: 2024-10-22

## 2024-10-22 RX ORDER — SODIUM HYPOCHLORITE 0.057 %
1 GEL (GRAM) TOPICAL EVERY 12 HOURS
Refills: 0 | Status: DISCONTINUED | OUTPATIENT
Start: 2024-10-22 | End: 2024-10-29

## 2024-10-22 RX ORDER — SODIUM CHLORIDE 9 MG/ML
250 INJECTION, SOLUTION INTRAMUSCULAR; INTRAVENOUS; SUBCUTANEOUS ONCE
Refills: 0 | Status: COMPLETED | OUTPATIENT
Start: 2024-10-22 | End: 2024-10-22

## 2024-10-22 RX ORDER — ERYTHROPOIETIN 10000 [IU]/ML
10000 INJECTION, SOLUTION INTRAVENOUS; SUBCUTANEOUS
Refills: 0 | Status: DISCONTINUED | OUTPATIENT
Start: 2024-10-22 | End: 2024-10-29

## 2024-10-22 RX ORDER — ALBUMIN HUMAN 50 G/1000ML
50 SOLUTION INTRAVENOUS
Refills: 0 | Status: COMPLETED | OUTPATIENT
Start: 2024-10-22 | End: 2024-10-24

## 2024-10-22 RX ORDER — HYDROMORPHONE HCL/0.9% NACL/PF 6 MG/30 ML
0.5 PATIENT CONTROLLED ANALGESIA SYRINGE INTRAVENOUS EVERY 8 HOURS
Refills: 0 | Status: DISCONTINUED | OUTPATIENT
Start: 2024-10-22 | End: 2024-10-26

## 2024-10-22 RX ADMIN — Medication 1 APPLICATION(S): at 15:07

## 2024-10-22 RX ADMIN — HEPARIN SODIUM 1000 UNIT(S)/HR: 10000 INJECTION INTRAVENOUS; SUBCUTANEOUS at 00:18

## 2024-10-22 RX ADMIN — ALBUMIN HUMAN 200 MILLILITER(S): 50 SOLUTION INTRAVENOUS at 12:32

## 2024-10-22 RX ADMIN — ERYTHROPOIETIN 10000 UNIT(S): 10000 INJECTION, SOLUTION INTRAVENOUS; SUBCUTANEOUS at 11:29

## 2024-10-22 RX ADMIN — LACOSAMIDE 150 MILLIGRAM(S): 100 TABLET ORAL at 15:06

## 2024-10-22 RX ADMIN — Medication 0.5 MILLIGRAM(S): at 10:49

## 2024-10-22 RX ADMIN — BUSPIRONE HYDROCHLORIDE 10 MILLIGRAM(S): 15 TABLET ORAL at 15:05

## 2024-10-22 RX ADMIN — BUSPIRONE HYDROCHLORIDE 10 MILLIGRAM(S): 15 TABLET ORAL at 21:17

## 2024-10-22 RX ADMIN — Medication 0.5 MILLIGRAM(S): at 11:05

## 2024-10-22 RX ADMIN — MUPIROCIN 1 APPLICATION(S): 20 OINTMENT TOPICAL at 15:06

## 2024-10-22 RX ADMIN — ESCITALOPRAM 20 MILLIGRAM(S): 10 TABLET, FILM COATED ORAL at 15:05

## 2024-10-22 RX ADMIN — HEPARIN SODIUM 1000 UNIT(S)/HR: 10000 INJECTION INTRAVENOUS; SUBCUTANEOUS at 00:21

## 2024-10-22 RX ADMIN — LACOSAMIDE 150 MILLIGRAM(S): 100 TABLET ORAL at 21:17

## 2024-10-22 RX ADMIN — Medication 1 APPLICATION(S): at 21:18

## 2024-10-22 RX ADMIN — ALBUMIN HUMAN 200 MILLILITER(S): 50 SOLUTION INTRAVENOUS at 13:34

## 2024-10-22 RX ADMIN — ALBUMIN HUMAN 200 MILLILITER(S): 50 SOLUTION INTRAVENOUS at 11:33

## 2024-10-22 RX ADMIN — HEPARIN SODIUM 1000 UNIT(S)/HR: 10000 INJECTION INTRAVENOUS; SUBCUTANEOUS at 19:15

## 2024-10-22 RX ADMIN — HEPARIN SODIUM 1000 UNIT(S)/HR: 10000 INJECTION INTRAVENOUS; SUBCUTANEOUS at 07:08

## 2024-10-22 RX ADMIN — PANTOPRAZOLE SODIUM 40 MILLIGRAM(S): 40 TABLET, DELAYED RELEASE ORAL at 15:06

## 2024-10-22 RX ADMIN — Medication 0.5 MILLIGRAM(S): at 16:40

## 2024-10-22 RX ADMIN — SODIUM CHLORIDE 250 MILLILITER(S): 9 INJECTION, SOLUTION INTRAMUSCULAR; INTRAVENOUS; SUBCUTANEOUS at 21:17

## 2024-10-22 RX ADMIN — CHLORHEXIDINE GLUCONATE 1 APPLICATION(S): 40 SOLUTION TOPICAL at 05:04

## 2024-10-22 RX ADMIN — HEPARIN SODIUM 1000 UNIT(S)/HR: 10000 INJECTION INTRAVENOUS; SUBCUTANEOUS at 18:01

## 2024-10-22 RX ADMIN — Medication 0.5 MILLIGRAM(S): at 16:25

## 2024-10-22 RX ADMIN — MUPIROCIN 1 APPLICATION(S): 20 OINTMENT TOPICAL at 21:17

## 2024-10-22 NOTE — CONSULT NOTE ADULT - SUBJECTIVE AND OBJECTIVE BOX
This is a 54-year-old patient and per MD H&P "with past medical history for antiphospholipid syndrome, end-stage renal disease on dialysis Monday Wednesday Friday, acute embolism and thrombosis of the right internal jugular vein, high blood pressure, stroke, endocarditis, high cholesterol, presents emergency department for hypoxia, hypotension, less responsiveness.  Patient is awake,  soft  smoking and  answers few questions.  Patient currently with no pain  .  [facility] called and informed that patient was found this morning satting 60%, blood pressure 87/55 and low temperature of 97.2.  Patient was sent to the ER for further evaluation.  Patient is a DNR, but   Not DNI    Surgery Consulted after wound care consult was placed for possible sacral decubitus ulcer. Seen and evaluated earlier by wound care RN, who gave recs for chemical debridement using dakins solution.     ICU Vital Signs Last 24 Hrs  T(C): 36.8 (22 Oct 2024 14:45), Max: 37.3 (22 Oct 2024 10:38)  T(F): 98.2 (22 Oct 2024 14:45), Max: 99.2 (22 Oct 2024 10:38)  HR: 89 (22 Oct 2024 14:45) (81 - 97)  BP: 93/61 (22 Oct 2024 14:45) (91/57 - 119/71)  BP(mean): 73 (22 Oct 2024 11:30) (73 - 100)  ABP: --  ABP(mean): --  RR: 17 (22 Oct 2024 14:45) (12 - 26)  SpO2: 100% (22 Oct 2024 14:45) (95% - 100%)    O2 Parameters below as of 22 Oct 2024 14:45  Patient On (Oxygen Delivery Method): room air      General: INAD A&OX3  Lungs: CTA b/l  CV: RRR  Abd: soft nt/nd  Sacrum: Full thickeness sacral wound measuring 6cm x 8cm x.5cm with yellow slough and malodorous drainage.

## 2024-10-22 NOTE — PROGRESS NOTE ADULT - ASSESSMENT
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) via lefr arm picc  rt AVF   hypercalcemia   SHock r/o sepsis     REC  - no urgent indication for hd now   - no identifiable reachable next of kin/hcp will plan for 2PC for HD consent   - rt AVF with + briuit, weak thrill   - hypercalcemia work-up including spep and upep   - fu pt/inr, pt may require temp hd catheter     coumadin on hold until results available   - pressor support with levophed  - s/p CTA with A/P await results  - echo   - abg   - fu cx send   dw RENITA Bedolla and Dr White    10/19 MK   - ESRD     hd today and will attempt uf with hd     via avf   - APL no now on iv heparin    rt subclavian clot     hx of cva with aphasia   - GPC sepsis     dapto and cefepime as per ID, renally dosed     fu cx     trend of outpt cx noted from id noted in sept cleared bacteremia     TTE no vegetation     ? need to dc PICC line   dw dr white and ccu team     10/20 MK   - ESRD      likely tts while inpt, sooner if clinically indicated     via avf, fx well with hd yesterday   - APL no now on iv heparin    rt subclavian clot     hx of cva with aphasia   - melena for hold of heparin     no renal barrier for IV contrast   - GPC sepsis     dapto and cefepime as per ID, renally dosed     fu cx     trend of outpt cx noted from id noted in sept cleared bacteremia     TTE no vegetation     ? need to dc PICC line   dw dr white and ccu team     10/21 Mk   - ESRD tts, AVF   - APL no now on iv heparin    rt subclavian clot     hx of cva with aphasia   - ? melena    gi input noted, brown stool     fu trend of h/h  - GPC / staph hominis sepsis     dapto and cefepime as per ID, renally dosed     fu repeat cx 10/20    trend of outpt cx noted from id noted in sept cleared bacteremia     TTE no vegetation   dw dr baca and  ccu team     10/22 MK   - ESRD tts, AVF     tolerating hd   - hypercalcemia      correction with HD on low calcium bath      may need to start sensipar at low dose based upong trend     pth 258  - APL no now on iv heparin    rt subclavian clot     hx of cva with aphasia   - ? melena    gi input noted, brown stool     fu trend of h/h    s/p 1unit of prbc 10/21     start arjun with hd 10K  - GPC / staph hominis sepsis     dapto and cefepime as per ID, renally dosed     fu repeat cx 10/20 NGTD    trend of outpt cx noted from id noted in sept cleared bacteremia     TTE no vegetation   dw dr baca and  ccu team

## 2024-10-22 NOTE — CONSULT NOTE ADULT - ASSESSMENT
55 yo F admitted with hypoxia and hypotension, surgery consulted for possible surgical debridement of sacral decubitus ulcer  Agree with Watson Dawn's recommendation's for chemical wound debridement using 1/4 strength dakins.  Local wound care  No surgical debridement at this time.    D/w Dr. Velez    Reconsult if needed.

## 2024-10-22 NOTE — PROGRESS NOTE ADULT - SUBJECTIVE AND OBJECTIVE BOX
NEPHROLOGY INTERVAL HPI/OVERNIGHT EVENTS:  10-22-24 @ 10:34    10/22 seen at hd , no c/o  10/21 1unit prbc, no sob, comfortable   10/20 + melena for CT with ivc   10/19 seen at HD, alert awake   HPI:  54-year-old patient with past medical history for antiphospholipid syndrome, end-stage renal disease on dialysis Monday Wednesday Friday, acute embolism and thrombosis of the right internal jugular vein, high blood pressure, stroke, endocarditis, high cholesterol, presents emergency department for hypoxia, hypotension, less responsiveness.  Patient is awake,  soft  smoking and  answers few questions.  Patient currently with no pain  .  [facility] [facility] called and informed that patient was found this morning satting 60%, blood pressure 87/55 and low temperature of 97.2.  Patient was sent to the ER for further evaluation.  Patient is a DNR, but   Not DNI  ICU called for consult  10/18: Hypotensive despite 1L IVF; add'l ordered, ESRD noted. Pressors ordered. Workup in progress.  (18 Oct 2024 11:35)  Patient is a 54y old  Female who presents with a chief complaint of Shock (18 Oct 2024 11:35)  ==============================================================================  NEPHROLOGY CONSULT  hx from chart and reivew of transfer document   esrd at [facility] on daily hd m-f   on MRSA endocard tx on vanc 500 mg end date 10/18 via left PICC line   admitted for shock with hypoxia   s/p CTA and a/p await results  on levofed   abg pending, sa02 not recordable   lethargic   has hx of APL on coumadin, with SLE on plaquenil   AVF left arm with + thrill and bruit  CCM input noted, unable to identify HCP         MEDICATIONS  (STANDING):  busPIRone 10 milliGRAM(s) Oral two times a day  chlorhexidine 4% Liquid 1 Application(s) Topical <User Schedule>  Dakins Solution - 1/4 Strength 1 Application(s) Topical every 12 hours  epoetin fredi-epbx (RETACRIT) Injectable 47448 Unit(s) IV Push <User Schedule>  escitalopram 20 milliGRAM(s) Oral daily  heparin  Infusion. 900 Unit(s)/Hr (9 mL/Hr) IV Continuous <Continuous>  lacosamide 150 milliGRAM(s) Oral two times a day  mupirocin 2% Nasal 1 Application(s) Both Nostrils two times a day  pantoprazole  Injectable 40 milliGRAM(s) IV Push every 12 hours    MEDICATIONS  (PRN):  haloperidol    Injectable 5 milliGRAM(s) IV Push every 6 hours PRN agitation  heparin   Injectable 2500 Unit(s) IV Push every 6 hours PRN For aPTT between 40 - 57  heparin   Injectable 5500 Unit(s) IV Push every 6 hours PRN For aPTT less than 40  LORazepam   Injectable 1 milliGRAM(s) IV Push every 6 hours PRN Agitation      Allergies    No Known Allergies    Intolerances        I&O's Detail    21 Oct 2024 07:01  -  22 Oct 2024 07:00  --------------------------------------------------------  IN:    Heparin Infusion: 183 mL    IV PiggyBack: 113 mL    PRBCs (Packed Red Blood Cells): 350 mL  Total IN: 646 mL    OUT:  Total OUT: 0 mL    Total NET: 646 mL      Patient was seen and evaluated on dialysis.   Patient is tolerating the procedure well.   Continue dialysis:   Dialyzer:     f180 k protocol uf goal of 1.5 kg   bfr 400 avf  spa for bp support       Vital Signs Last 24 Hrs  T(C): 36.8 (22 Oct 2024 08:00), Max: 37.1 (21 Oct 2024 16:18)  T(F): 98.3 (22 Oct 2024 08:00), Max: 98.8 (21 Oct 2024 16:18)  HR: 92 (22 Oct 2024 10:00) (87 - 108)  BP: 106/67 (22 Oct 2024 10:00) (95/36 - 119/71)  BP(mean): 80 (22 Oct 2024 10:00) (52 - 100)  RR: 24 (22 Oct 2024 10:00) (12 - 26)  SpO2: 95% (22 Oct 2024 10:00) (94% - 100%)    Parameters below as of 22 Oct 2024 10:00  Patient On (Oxygen Delivery Method): room air      Daily     Daily     PHYSICAL EXAM:  General: alert. awake NAD  HEENT: MMM  CV: s1s2 rrr  LUNGS: B/L CTA  EXT: no edema    LABS:                        8.3    9.88  )-----------( 154      ( 22 Oct 2024 06:14 )             26.9     10-22    141  |  105  |  47[H]  ----------------------------<  111[H]  4.0   |  29  |  7.14[H]    Ca    11.2[H]      22 Oct 2024 06:14  Phos  4.2     10-22  Mg     1.9     10-22    TPro  x   /  Alb  2.0[L]  /  TBili  x   /  DBili  x   /  AST  x   /  ALT  x   /  AlkPhos  x   10-22    PTT - ( 22 Oct 2024 06:14 )  PTT:78.3 sec  Urinalysis Basic - ( 22 Oct 2024 06:14 )    Color: x / Appearance: x / SG: x / pH: x  Gluc: 111 mg/dL / Ketone: x  / Bili: x / Urobili: x   Blood: x / Protein: x / Nitrite: x   Leuk Esterase: x / RBC: x / WBC x   Sq Epi: x / Non Sq Epi: x / Bacteria: x      Magnesium: 1.9 mg/dL (10-22 @ 06:14)  Phosphorus: 4.2 mg/dL (10-22 @ 06:14)

## 2024-10-22 NOTE — PROGRESS NOTE ADULT - SUBJECTIVE AND OBJECTIVE BOX
CHIEF COMPLAINT:    HPI:  ICU admit note    S:    Pt seen and examined  54-year-old patient with past medical history for antiphospholipid syndrome, end-stage renal disease on dialysis Monday Wednesday Friday, acute embolism and thrombosis of the right internal jugular vein, high blood pressure, stroke, endocarditis, high cholesterol, presents emergency department for hypoxia, hypotension, less responsiveness.  Patient is awake,  soft  smoking and  answers few questions.  Patient currently with no pain  .  [facility] called and informed that patient was found this morning satting 60%, blood pressure 87/55 and low temperature of 97.2.  Patient was sent to the ER for further evaluation.  Patient is a DNR, but   Not DNI    ICU called for consult    10/18: Hypotensive despite 1L IVF; add'l ordered, ESRD noted. Pressors ordered. Workup in progress.  (18 Oct 2024 11:35)    Consulted for abnormal echo. EF normal but small areas of regional wall motion abnormalities noted.  pt is not answering questioning on my history.  Per ICU team this is her baseline.    10/22/24 patient is feeling better , denies any chest pain or shortness of breath , but does not know where she is       PAST MEDICAL & SURGICAL HISTORY:  ESRD on dialysis      History of stroke            MEDICATIONS:  OUTPATIENT:  Home Medications:  busPIRone 10 mg oral tablet: 1 tab(s) orally 2 times a day (18 Oct 2024 11:16)  lacosamide 150 mg oral tablet: 1 tab(s) orally 2 times a day (18 Oct 2024 11:16)  Lexapro 20 mg oral tablet: 1 tab(s) orally once a day (18 Oct 2024 11:16)  Lipitor 40 mg oral tablet: 1 tab(s) orally once a day (at bedtime) (18 Oct 2024 11:16)  metoprolol succinate 25 mg oral capsule, extended release: 1 cap(s) orally once a day (18 Oct 2024 11:16)  miconazole 2% topical ointment: Apply topically to affected area 2 times a day apply to groin, perinium, buttocks (18 Oct 2024 11:16)  NIFEdipine (Eqv-Adalat CC) 30 mg oral tablet, extended release: 1 tab(s) orally once a day (18 Oct 2024 11:16)  Plaquenil 200 mg oral tablet: 2 tab(s) orally once a day (18 Oct 2024 11:16)  Polyethylene Glycol 3350: 17 billion vector genomes orally once a day (18 Oct 2024 11:16)  Protonix 40 mg oral delayed release tablet: 1 tab(s) orally once a day (18 Oct 2024 11:16)  Santyl 250 units/g topical ointment: Apply topically to affected area once a day apply to sacrum (18 Oct 2024 11:16)  senna (sennosides) 8.6 mg oral tablet: 1 tab(s) orally once a day (18 Oct 2024 11:16)  traMADol 50 mg oral tablet: 1 tab(s) orally every 8 hours as needed for  severe pain (18 Oct 2024 11:16)  traZODone 100 mg oral tablet: 1 tab(s) orally once a day (at bedtime) (18 Oct 2024 11:16)  Tylenol Caplet 325 mg oral tablet: 2 tab(s) orally every 6 hours as needed for fever and moderate pain (18 Oct 2024 11:16)  warfarin 6 mg oral tablet: 1 tab(s) orally once a day (18 Oct 2024 11:16)  Xanax 0.5 mg oral tablet: 1 tab(s) orally once a day (18 Oct 2024 11:16)      MEDICATIONS  (STANDING):  busPIRone 10 milliGRAM(s) Oral two times a day  chlorhexidine 4% Liquid 1 Application(s) Topical <User Schedule>  collagenase Ointment 1 Application(s) Topical daily  Dakins Solution - 1/4 Strength 1 Application(s) Topical every 12 hours  epoetin fredi-epbx (RETACRIT) Injectable 44823 Unit(s) IV Push <User Schedule>  escitalopram 20 milliGRAM(s) Oral daily  heparin  Infusion. 900 Unit(s)/Hr (9 mL/Hr) IV Continuous <Continuous>  lacosamide 150 milliGRAM(s) Oral two times a day  mupirocin 2% Nasal 1 Application(s) Both Nostrils two times a day  pantoprazole  Injectable 40 milliGRAM(s) IV Push every 12 hours    MEDICATIONS  (PRN):  haloperidol    Injectable 5 milliGRAM(s) IV Push every 6 hours PRN agitation  heparin   Injectable 2500 Unit(s) IV Push every 6 hours PRN For aPTT between 40 - 57  heparin   Injectable 5500 Unit(s) IV Push every 6 hours PRN For aPTT less than 40  LORazepam   Injectable 1 milliGRAM(s) IV Push every 6 hours PRN Agitation      Vital Signs Last 24 Hrs  T(C): 36.8 (22 Oct 2024 08:00), Max: 37.1 (21 Oct 2024 16:18)  T(F): 98.3 (22 Oct 2024 08:00), Max: 98.8 (21 Oct 2024 16:18)  HR: 92 (22 Oct 2024 10:00) (87 - 108)  BP: 106/67 (22 Oct 2024 10:00) (95/36 - 119/71)  BP(mean): 80 (22 Oct 2024 10:00) (52 - 100)  RR: 24 (22 Oct 2024 10:00) (12 - 26)  SpO2: 95% (22 Oct 2024 10:00) (94% - 100%)    Parameters below as of 22 Oct 2024 10:00  Patient On (Oxygen Delivery Method): room air        I&O's Summary    21 Oct 2024 07:01  -  22 Oct 2024 07:00  --------------------------------------------------------  IN: 646 mL / OUT: 0 mL / NET: 646 mL            PHYSICAL EXAM:    Constitutional: NAD, awake  HEENT: PERR, EOMI,  No oral cyananosis.  Neck:  supple,  No JVD  Respiratory: Breath sounds are clear bilaterally, No wheezing, rales or rhonchi  Cardiovascular: S1 and S2, regular rate and rhythm, no Murmurs, gallops or rubs  Gastrointestinal: Bowel Sounds present, soft, nontender.   Extremities: No peripheral edema. No clubbing or cyanosis.  Vascular: 2+ peripheral pulses  Neurological: A/O x O forget ful   Musculoskeletal: no calf tenderness.  Skin: No rashes.    ===============================  ===============================  LABS:                             8.3    9.88  )-----------( 154      ( 22 Oct 2024 06:14 )             26.9     10-22    141  |  105  |  47[H]  ----------------------------<  111[H]  4.0   |  29  |  7.14[H]    Ca    11.2[H]      22 Oct 2024 06:14  Phos  4.2     10-22  Mg     1.9     10-22    TPro  7.1  /  Alb  2.1[L]  /  TBili  0.6  /  DBili  x   /  AST  29  /  ALT  27  /  AlkPhos  90  10-21        LIVER FUNCTIONS - ( 21 Oct 2024 08:23 )  Alb: 2.1 g/dL / Pro: 7.1 gm/dL / ALK PHOS: 90 U/L / ALT: 27 U/L / AST: 29 U/L / GGT: x           PTT - ( 22 Oct 2024 06:14 )  PTT:78.3 sec  Urinalysis Basic - ( 22 Oct 2024 06:14 )    Color: x / Appearance: x / SG: x / pH: x  Gluc: 111 mg/dL / Ketone: x  / Bili: x / Urobili: x   Blood: x / Protein: x / Nitrite: x   Leuk Esterase: x / RBC: x / WBC x   Sq Epi: x / Non Sq Epi: x / Bacteria: x        EKG:  sinus tachycardia, septal infarct, nonspecific T wave inversions laterally.        < from: TTE W or WO Ultrasound Enhancing Agent (10.18.24 @ 13:20) >  CONCLUSIONS:      1. Left ventricular cavity is small. Left ventricular systolic function is normal with an ejection fraction of 51 % by 3D   with an ejection fraction visually estimated at 50 to 55 %. Regional wall motion abnormalities present.   2. Mid and apical anterior septum and mid and apical inferior septum are abnormal.   3. There is mild (grade 1) left ventricular diastolic dysfunction.   4. Normal right ventricular cavity size, with normal wall thickness, and normal right ventricular systolic function.   5. Left atrium is mildly dilated.   6. Mild mitral regurgitation.   7. Mild tricuspid regurgitation.   8. Estimated pulmonary artery systolic pressure is 35 mmHg, consistent with borderline pulmonary hypertension.   9. Mild mitral valve stenosis.  10. There is mild calcification of the mitral valve annulus.  11. Trileaflet aortic valve with normal systolic excursion. There is minimal thickening of the aortic valve leaflets.    < end of copied text >     monitor sinus rhythm tachycardiac

## 2024-10-22 NOTE — PROGRESS NOTE ADULT - ASSESSMENT
53 y/o female PMH APS on warfarin, ESRD on HD, ?lupus on Plaquenil, CVA, right internal jugular vein thrombus, functional quadriplegia, seizure d/o on Vimpat, recent hospitalization at [facility] for MRSA endocarditis (finished vanc on 10/18), presented from [place] for hypotension, hypoxia and poor responsiveness/lethargy   Remained hypotensive in ED despite 2.5L IVF, started on pressors and admitted to CCU     #Hypotension/shock multifactorial - hypovolemic/medication   #Acute toxic encephalopathy likely due to meds/polypharmacy  #Acute hypoxic respiratory failure   - s/p CCU care requiring pressors for BP support, now off  - CTH negative for CVA/hemorrhage, notes Right frontal temporal encephalomalacia/gliosis, Extensive white matter small vessel ischemic disease/chronic left thalamic infarct.  - CTA chest negative for PE or PNA  - s/p supplemental O2, monitor pulse ox  - continue Haldol/Ativan PRN for agitation   - home BB + nifedipine held given shock, resume as BP allows  - PT evaluation    #Suspected GI bleed   #Acute blood loss anemia   - Hgb 9.6 -> 7.0 -> s/p 1 unit now 8.3 on hep gtt  - GI consulted Dr. Galicia: no EGD or c scope planned  - FOBT + but no active melena/hematochezia. No urgent need for invasive intervention per GI   - continue PPI BID  - continue to trend H/H, transfuse PRN    #APS on warfarin   #Occluded right subclavian vein stent   - holding Warfarin until H/H stable, continue hep gtt today, if H/H remains stable, start coumadin tomorrow  - INR 1.43 on arrival, subtherapeutic   - Arterial duplex: Occluded or almost occluded medial portion of the right subclavian vein stent -> vascular consult    #Elevated troponin  - Troponin 86 -> 281 -> 174  - TTE as above with preserved LVEF, grade 1 LV diastolic dysfunction, +regional WMAs present  - Cardiology consulted : not candidate for ischemic evaluation-> cont to tx medically, ASA, statin    #Recent MRSA endocarditis   - completed tx with Vanco via PICC on 10/18  - TTE without vegetations   - BCx 10/18 Staph hominis and Staph capitis, CoNS 2 out of 4 bottles  - BCx 10/20 negative  - ID consulted , BCx from 10/18 likely contaminant, IV abx discontinued. Monitor off abx     #ESRD on HD   - HD per renal     #Seizure d/o   #Hx CVA with dysphagia, functional quadriplegia   - continue vimpat   - Neurology consulted, unable to perform EEG due to lack of cooperation from pt  - soft and bite sized diet    #Numerous bed sores/unstageable Sacral decubitus ulcer  - wound care  - culture collected  - surgery consult for possible debridement  - will maintain hep gtt now in case debridement planned  - add dilaudid prn for severe pain    #Code status   - palliative care following  - pt is DNR, OK to intubate     #DVT ppx   - on hep gtt  D/W ICU Dr Prather

## 2024-10-22 NOTE — CONSULT NOTE ADULT - CONSULT REASON
esrd management, shock
'elevated troponin, hypotension, WMAs on echo'
Sacral decubitus ulcer
GIB
GOC
seizure
R subclavian stent occlusion
Shock

## 2024-10-22 NOTE — PROGRESS NOTE ADULT - SUBJECTIVE AND OBJECTIVE BOX
Chief Complaint: Patient is a 54y old  Female who presents with a chief complaint of Shock (21 Oct 2024 13:06)      Interval events:   - No acute events overnight, VSS, pt afebrile   - Pt downgraded out of CCU to floors   - Hgb 8.3 s/p 1 unit prbc on  10/21, guaic positive. VSS. on hep gtt ofr R subclavian vein stent thrombosis  - pt cries out in pain due to sacral decub    ROS:   Patient denies any current chest pain, SOB, cough, f/c/n/v/d, abd pain, LE edema, myalgias, dysuria, HA, dizziness  All other review of systems is negative unless indicated above  ICU Vital Signs Last 24 Hrs  T(C): 36.8 (22 Oct 2024 14:45), Max: 37.3 (22 Oct 2024 10:38)  T(F): 98.2 (22 Oct 2024 14:45), Max: 99.2 (22 Oct 2024 10:38)  HR: 89 (22 Oct 2024 14:45) (81 - 97)  BP: 93/61 (22 Oct 2024 14:45) (91/57 - 119/71)  BP(mean): 73 (22 Oct 2024 11:30) (73 - 100)  ABP: --  ABP(mean): --  RR: 17 (22 Oct 2024 14:45) (12 - 26)  SpO2: 100% (22 Oct 2024 14:45) (95% - 100%)    O2 Parameters below as of 22 Oct 2024 14:45  Patient On (Oxygen Delivery Method): room air            Parameters below as of 21 Oct 2024 12:00  Patient On (Oxygen Delivery Method): room air    Constitutional: NAD, awake and alert  HEENT: PERRLA, EOMI, MMM  Respiratory: Breath sounds are clear bilaterally, No wheezing, rales or rhonchi  Cardiovascular: S1 and S2, RRR, no murmurs, gallops or rubs  Gastrointestinal: +BS, soft, non-tender, non-distended, no CVA tenderness  Extremities: No peripheral edema, +DP pulses b/l  Neurological: A&O x 1-2, no focal deficits  Musculoskeletal: 5/5 strength b/l upper and lower extremities  Skin: numerous ulcers to heels sacrum nad back    Labs:  10-21 @ 08:23  Glucose 97 mg/dL  HCO3 25 mmol/L  Chloride 106 mmol/L  Sodium 141 mmol/L>   Potassium 3.6 mmol/L  Creatinine 5.75 mg/dL  Calcium 11.2 mg/dL  BUN 34 mg/dL  eGFR 8 mL/min/1.73m2  Anion gap 10 mmol/L    WBC 9.04  Hemoglobin 7.0  Hemoatocrit 22.9  Platelet count 157    PTT - ( 21 Oct 2024 08:23 )  PTT:58.4 sec    Cardiac testing:  Troponin I, High Sensitivity Result: 174.25 ng/L (10-21-24 @ 08:23)  Troponin I, High Sensitivity Result: 281.43 ng/L (10-19-24 @ 05:42)  Troponin I, High Sensitivity Result: 85.68 ng/L (10-18-24 @ 07:32)    12 Lead ECG:   Ventricular Rate 108 BPM    Atrial Rate 108 BPM    P-R Interval 162 ms    QRS Duration 84 ms    Q-T Interval 354 ms    QTC Calculation(Bazett) 474 ms    P Axis 19 degrees    R Axis 20 degrees    T Axis 127 degrees    Diagnosis Line Sinus tachycardia  Septal infarct , age undetermined  T wave abnormality, consider lateral ischemia  Abnormal ECG  No previous ECGs available  Confirmed by MD KATHIE, THERESA ([id number]) on 10/18/2024 9:15:05 PM (10-18-24 @ 08:40)    Micro:  UA 10/18 -- Trace LE, 8 WBCs, +proteinuria   BCx 10/18 x1 -- coagulase negative staph   BCx 10/20 x2 -- NGTD    Radiology:  < from: Xray Chest 1 View- PORTABLE-Urgent (10.18.24 @ 09:46) >  IMPRESSION: No acute finding.      < from: CT Head No Cont (10.18.24 @ 10:03) >  IMPRESSION:  No acute intracranial  hemorrhage or midline shift.    Right frontal temporal encephalomalacia/gliosis.    Extensive white matter small vessel ischemic disease/chronic infarct.    Chronic left thalamic infarct.    Generalized volume loss with ventricular dilatation.      < from: CT Chest No Cont (10.18.24 @ 10:03) >  IMPRESSION:  No CT evidence of pneumonia.      < from: CT Angio Chest PE Protocol w/ IV Cont (10.18.24 @ 12:40) >  IMPRESSION:  No pulmonary embolism.    Focal nonopacification of a portion of the right subclavian artery stent,   suspicious for thrombus. Correlate with Doppler imaging.    Enlarged myomatous uterus, with large pedunculated 14cm fibroid extending   up to the left hemiabdomen.  Recommend GYN evaluation.      < from: US Duplex Arterial Upper Ext Ltd, Right (10.18.24 @ 16:04) >  IMPRESSION:  Occluded or almost occluded medial portion of the right subclavian vein   stent.    < from: TTE W or WO Ultrasound Enhancing Agent (10.18.24 @ 13:20) >   1. Left ventricular cavity is small. Left ventricular systolic function is normal with an ejection fraction of 51 % by 3D with an ejection fraction visually estimated at 50 to 55 %. Regional wall motion abnormalities present.   2. Mid and apical anterior septum and mid and apical inferior septum are abnormal.   3. There is mild (grade 1) left ventricular diastolic dysfunction.   4. Normal right ventricular cavity size, with normal wall thickness, and normal right ventricular systolic function.   5. Left atrium is mildly dilated.   6. Mild mitral regurgitation.   7. Mild tricuspid regurgitation.   8. Estimated pulmonary artery systolic pressure is 35 mmHg, consistent with borderline pulmonary hypertension.   9. Mild mitral valve stenosis.  10. There is mild calcification of the mitral valve annulus.  11. Trileaflet aortic valve with normal systolic excursion. There is minimal thickening of the aortic valve leaflets.    < end of copied text >      Medications:  MEDICATIONS  (STANDING):  busPIRone 10 milliGRAM(s) Oral two times a day  chlorhexidine 4% Liquid 1 Application(s) Topical <User Schedule>  collagenase Ointment 1 Application(s) Topical daily  escitalopram 20 milliGRAM(s) Oral daily  heparin  Infusion. 900 Unit(s)/Hr (9 mL/Hr) IV Continuous <Continuous>  lacosamide 150 milliGRAM(s) Oral two times a day  pantoprazole  Injectable 40 milliGRAM(s) IV Push every 12 hours    MEDICATIONS  (PRN):  haloperidol    Injectable 5 milliGRAM(s) IV Push every 6 hours PRN agitation  heparin   Injectable 2500 Unit(s) IV Push every 6 hours PRN For aPTT between 40 - 57  heparin   Injectable 5500 Unit(s) IV Push every 6 hours PRN For aPTT less than 40  LORazepam   Injectable 1 milliGRAM(s) IV Push every 6 hours PRN Agitation

## 2024-10-22 NOTE — CONSULT NOTE ADULT - SUBJECTIVE AND OBJECTIVE BOX
HPI:  54-year-old patient and per MD H&P "with past medical history for antiphospholipid syndrome, end-stage renal disease on dialysis MWF throught Right AVF, acute embolism and thrombosis of the right internal jugular vein, high blood pressure, stroke, endocarditis, HLD presented emergency department for hypoxia, hypotension, less responsiveness. Patient currently with complaining of pain all over her body. No acute distress. Patient is a DNR.    Vascular surgery consulted for R subclavian stent occlusion on CT scan and duplex.        PAST MEDICAL & SURGICAL HISTORY:  ESRD on dialysis      History of stroke          MEDICATIONS  (STANDING):  busPIRone 10 milliGRAM(s) Oral two times a day  chlorhexidine 4% Liquid 1 Application(s) Topical <User Schedule>  Dakins Solution - 1/4 Strength 1 Application(s) Topical every 12 hours  epoetin fredi-epbx (RETACRIT) Injectable 21218 Unit(s) IV Push <User Schedule>  escitalopram 20 milliGRAM(s) Oral daily  heparin  Infusion. 900 Unit(s)/Hr (9 mL/Hr) IV Continuous <Continuous>  lacosamide 150 milliGRAM(s) Oral two times a day  mupirocin 2% Nasal 1 Application(s) Both Nostrils two times a day  pantoprazole  Injectable 40 milliGRAM(s) IV Push every 12 hours    MEDICATIONS  (PRN):  albumin human 25% IVPB 50 milliLiter(s) IV Intermittent every 1 hour PRN syst bp < 100 on dialysis  haloperidol    Injectable 5 milliGRAM(s) IV Push every 6 hours PRN agitation  heparin   Injectable 2500 Unit(s) IV Push every 6 hours PRN For aPTT between 40 - 57  heparin   Injectable 5500 Unit(s) IV Push every 6 hours PRN For aPTT less than 40  HYDROmorphone  Injectable 0.5 milliGRAM(s) IV Push every 8 hours PRN Severe Pain (7 - 10)  LORazepam   Injectable 1 milliGRAM(s) IV Push every 6 hours PRN Agitation      Allergies    No Known Allergies    Intolerances        SOCIAL HISTORY:    FAMILY HISTORY:          Physical Exam:  General: NAD, resting comfortably  HEENT: NC/AT, EOMI, normal hearing, no oral lesions, no LAD, neck supple  Pulmonary: normal resp effort, CTA-B  Cardiovascular: NSR, no murmurs  Abdominal: soft, ND/NT, no organomegaly  Extremities: WWP, normal strength, no clubbing/cyanosis/edema. R AVF with good thrill. Palpable radial and ulnar pulse  Neuro: A/O x 3, CNs II-XII grossly intact, normal sensation, no focal deficits  Pulses: palpable distal pulses  Sacrum: Full thickeness sacral wound measuring 6cm x 8cm x.5cm with yellow slough and malodorous drainage.      Vital Signs Last 24 Hrs  T(C): 36.8 (22 Oct 2024 14:45), Max: 37.3 (22 Oct 2024 10:38)  T(F): 98.2 (22 Oct 2024 14:45), Max: 99.2 (22 Oct 2024 10:38)  HR: 88 (22 Oct 2024 16:00) (81 - 97)  BP: 91/67 (22 Oct 2024 16:00) (91/57 - 119/71)  BP(mean): 75 (22 Oct 2024 16:00) (73 - 100)  RR: 20 (22 Oct 2024 16:00) (12 - 26)  SpO2: 100% (22 Oct 2024 16:00) (95% - 100%)    Parameters below as of 22 Oct 2024 16:00  Patient On (Oxygen Delivery Method): room air        I&O's Summary    21 Oct 2024 07:01  -  22 Oct 2024 07:00  --------------------------------------------------------  IN: 646 mL / OUT: 0 mL / NET: 646 mL    22 Oct 2024 07:01  -  22 Oct 2024 16:50  --------------------------------------------------------  IN: 0 mL / OUT: 2000 mL / NET: -2000 mL            LABS:                        8.3    9.88  )-----------( 154      ( 22 Oct 2024 06:14 )             26.9     10-22    141  |  105  |  47[H]  ----------------------------<  111[H]  4.0   |  29  |  7.14[H]    Ca    11.2[H]      22 Oct 2024 06:14  Phos  4.2     10-22  Mg     1.9     10-22    TPro  x   /  Alb  2.0[L]  /  TBili  x   /  DBili  x   /  AST  x   /  ALT  x   /  AlkPhos  x   10-22    PTT - ( 22 Oct 2024 06:14 )  PTT:78.3 sec  Urinalysis Basic - ( 22 Oct 2024 06:14 )    Color: x / Appearance: x / SG: x / pH: x  Gluc: 111 mg/dL / Ketone: x  / Bili: x / Urobili: x   Blood: x / Protein: x / Nitrite: x   Leuk Esterase: x / RBC: x / WBC x   Sq Epi: x / Non Sq Epi: x / Bacteria: x      CAPILLARY BLOOD GLUCOSE        LIVER FUNCTIONS - ( 22 Oct 2024 06:14 )  Alb: 2.0 g/dL / Pro: x     / ALK PHOS: x     / ALT: x     / AST: x     / GGT: x

## 2024-10-22 NOTE — ADVANCED PRACTICE NURSE CONSULT - RECOMMEDATIONS
-Continue turning/positioning patient from side-to-side q2h while in bed, q1h when/if OOB chair, or in accordance w/ pt's plan of care. Utilize pillows and/or Spry positioner pillow to assist w/ turning/positioning. When/if OOB chair, utilize pillows or chair cushion to offload pressure.   -Continue to offload heels from bed surface with soft pillow under calfs or by applying offloading boots to BLEs.   -Continue applying Coloplast Rubén Protect moisture barrier cream to buttock and perineal area daily and prn after each incontinent episode.    -Continue utilizing one underpad underneath patient to contain incontinence episodes; change pad when saturated/soiled.   -Consider utilizing female incontinence device/Primafit (if patient candidate) to contain urinary incontinence.  -Continue nutrition consult for optimal wound healing & nutritional status.   -Assess skin/wound qshift, report changes to primary provider.   -Maintain Low Air loss mattress for pressure redistribution   -Utilize Hovermat for boosting and transferring    Left and Right Heel continue to monitor and keep elevated off mattress with cair boots     Sacrum  Surgical consult to assess for debridement     Dressing for Sacrum   -Irrigate with 1/4 Strength Dakins solution then with saline pat dry  -Apply Dakins to packing and fluff in wound to undermining and wound bed '  -Secure with Foam  change 2 times per day and prn if dressing is compromised    Left Ischium -  -Apply triad cream daily and prn is soiled.        Plan of Care: Primary RN made aware of above recs. Spoke w/ Dr CECIL Prather in regards to above. No further needs/recs from [facility] service at this time. Staff RN to perform routine skin/wound assessment and manage wound care. Questions or concerns or if wound worsens and reconsult needed, please contact ON

## 2024-10-22 NOTE — CONSULT NOTE ADULT - ASSESSMENT
54-year-old patient and per MD H&P "with past medical history for antiphospholipid syndrome, end-stage renal disease on dialysis MWF throught Right AVF, acute embolism and thrombosis of the right internal jugular vein, high blood pressure, stroke, endocarditis, HLD presented emergency department for hypoxia, hypotension, less responsiveness. Patient currently with complaining of pain all over her body. No acute distress. Patient is a DNR.    Vascular surgery consulted for R subclavian stent occlusion on CT scan and duplex.      Pt with excellent R AVF thrill  Palpable radial and ulnar pulse.   No arm swelling    P:  - No acute surgical intervention needed, unlikely stent completely occulded based on physical exam.  - Rest of management as per primary team  - restart AC when medically appropriate.    Plan discussed with Dr Spivey

## 2024-10-22 NOTE — CONSULT NOTE ADULT - CONSULT REQUESTED DATE/TIME
22-Oct-2024 16:11
19-Oct-2024 13:00
21-Oct-2024 09:30
18-Oct-2024 13:42
20-Oct-2024 14:28
19-Oct-2024 08:32
22-Oct-2024 16:50
21-Oct-2024 21:01

## 2024-10-22 NOTE — CHART NOTE - NSCHARTNOTEFT_GEN_A_CORE
Patient was noted to have a low BP of 84/60. Ordered 250cc bolus. Patients BP now 96/.... Will continue to monitor. Patient was noted to have a low BP of 84/60. Ordered 250cc bolus. Patients BP now 95/60. Will continue to monitor.

## 2024-10-22 NOTE — CONSULT NOTE ADULT - PROVIDER SPECIALTY LIST ADULT
Infectious Disease
Vascular Surgery
Palliative Care
Neurology
Surgery
Nephrology
Gastroenterology
Cardiology

## 2024-10-23 LAB
ANION GAP SERPL CALC-SCNC: 7 MMOL/L — SIGNIFICANT CHANGE UP (ref 5–17)
APTT BLD: 56.5 SEC — HIGH (ref 24.5–35.6)
APTT BLD: 86.9 SEC — HIGH (ref 24.5–35.6)
APTT BLD: 90.9 SEC — HIGH (ref 24.5–35.6)
BUN SERPL-MCNC: 17 MG/DL — SIGNIFICANT CHANGE UP (ref 7–23)
CALCIUM SERPL-MCNC: 11 MG/DL — HIGH (ref 8.5–10.1)
CHLORIDE SERPL-SCNC: 99 MMOL/L — SIGNIFICANT CHANGE UP (ref 96–108)
CO2 SERPL-SCNC: 29 MMOL/L — SIGNIFICANT CHANGE UP (ref 22–31)
CREAT SERPL-MCNC: 3.92 MG/DL — HIGH (ref 0.5–1.3)
CULTURE RESULTS: SIGNIFICANT CHANGE UP
EGFR: 13 ML/MIN/1.73M2 — LOW
GLUCOSE BLDC GLUCOMTR-MCNC: 88 MG/DL — SIGNIFICANT CHANGE UP (ref 70–99)
GLUCOSE SERPL-MCNC: 83 MG/DL — SIGNIFICANT CHANGE UP (ref 70–99)
HCT VFR BLD CALC: 27.9 % — LOW (ref 34.5–45)
HGB BLD-MCNC: 8.7 G/DL — LOW (ref 11.5–15.5)
INR BLD: 1.19 RATIO — HIGH (ref 0.85–1.16)
MCHC RBC-ENTMCNC: 30.7 PG — SIGNIFICANT CHANGE UP (ref 27–34)
MCHC RBC-ENTMCNC: 31.2 GM/DL — LOW (ref 32–36)
MCV RBC AUTO: 98.6 FL — SIGNIFICANT CHANGE UP (ref 80–100)
PLATELET # BLD AUTO: 148 K/UL — LOW (ref 150–400)
POTASSIUM SERPL-MCNC: 3.4 MMOL/L — LOW (ref 3.5–5.3)
POTASSIUM SERPL-SCNC: 3.4 MMOL/L — LOW (ref 3.5–5.3)
PROTHROM AB SERPL-ACNC: 14 SEC — HIGH (ref 9.9–13.4)
RBC # BLD: 2.83 M/UL — LOW (ref 3.8–5.2)
RBC # FLD: 22.5 % — HIGH (ref 10.3–14.5)
SODIUM SERPL-SCNC: 135 MMOL/L — SIGNIFICANT CHANGE UP (ref 135–145)
SPECIMEN SOURCE: SIGNIFICANT CHANGE UP
WBC # BLD: 10.68 K/UL — HIGH (ref 3.8–10.5)
WBC # FLD AUTO: 10.68 K/UL — HIGH (ref 3.8–10.5)

## 2024-10-23 PROCEDURE — 99233 SBSQ HOSP IP/OBS HIGH 50: CPT | Mod: FS

## 2024-10-23 PROCEDURE — 99232 SBSQ HOSP IP/OBS MODERATE 35: CPT | Mod: FS

## 2024-10-23 RX ORDER — ASPIRIN/MAG CARB/ALUMINUM AMIN 325 MG
81 TABLET ORAL DAILY
Refills: 0 | Status: DISCONTINUED | OUTPATIENT
Start: 2024-10-23 | End: 2024-10-29

## 2024-10-23 RX ORDER — WARFARIN SODIUM 4 MG
6 TABLET ORAL DAILY
Refills: 0 | Status: DISCONTINUED | OUTPATIENT
Start: 2024-10-23 | End: 2024-10-25

## 2024-10-23 RX ADMIN — PANTOPRAZOLE SODIUM 40 MILLIGRAM(S): 40 TABLET, DELAYED RELEASE ORAL at 02:32

## 2024-10-23 RX ADMIN — HEPARIN SODIUM 1100 UNIT(S)/HR: 10000 INJECTION INTRAVENOUS; SUBCUTANEOUS at 15:15

## 2024-10-23 RX ADMIN — CHLORHEXIDINE GLUCONATE 1 APPLICATION(S): 40 SOLUTION TOPICAL at 10:21

## 2024-10-23 RX ADMIN — HEPARIN SODIUM 1000 UNIT(S)/HR: 10000 INJECTION INTRAVENOUS; SUBCUTANEOUS at 00:11

## 2024-10-23 RX ADMIN — HEPARIN SODIUM 1100 UNIT(S)/HR: 10000 INJECTION INTRAVENOUS; SUBCUTANEOUS at 08:34

## 2024-10-23 RX ADMIN — PANTOPRAZOLE SODIUM 40 MILLIGRAM(S): 40 TABLET, DELAYED RELEASE ORAL at 10:15

## 2024-10-23 RX ADMIN — BUSPIRONE HYDROCHLORIDE 10 MILLIGRAM(S): 15 TABLET ORAL at 23:10

## 2024-10-23 RX ADMIN — HEPARIN SODIUM 1000 UNIT(S)/HR: 10000 INJECTION INTRAVENOUS; SUBCUTANEOUS at 07:15

## 2024-10-23 RX ADMIN — ESCITALOPRAM 20 MILLIGRAM(S): 10 TABLET, FILM COATED ORAL at 10:16

## 2024-10-23 RX ADMIN — Medication 40 MILLIGRAM(S): at 23:14

## 2024-10-23 RX ADMIN — PANTOPRAZOLE SODIUM 40 MILLIGRAM(S): 40 TABLET, DELAYED RELEASE ORAL at 23:10

## 2024-10-23 RX ADMIN — HEPARIN SODIUM 1100 UNIT(S)/HR: 10000 INJECTION INTRAVENOUS; SUBCUTANEOUS at 22:32

## 2024-10-23 RX ADMIN — Medication 1 APPLICATION(S): at 10:16

## 2024-10-23 RX ADMIN — Medication 0.5 MILLIGRAM(S): at 00:05

## 2024-10-23 RX ADMIN — MUPIROCIN 1 APPLICATION(S): 20 OINTMENT TOPICAL at 10:16

## 2024-10-23 RX ADMIN — Medication 1 APPLICATION(S): at 23:15

## 2024-10-23 RX ADMIN — LACOSAMIDE 150 MILLIGRAM(S): 100 TABLET ORAL at 10:16

## 2024-10-23 RX ADMIN — Medication 0.5 MILLIGRAM(S): at 10:15

## 2024-10-23 RX ADMIN — LACOSAMIDE 150 MILLIGRAM(S): 100 TABLET ORAL at 23:10

## 2024-10-23 RX ADMIN — Medication 6 MILLIGRAM(S): at 23:09

## 2024-10-23 RX ADMIN — Medication 1 MILLIGRAM(S): at 16:38

## 2024-10-23 RX ADMIN — HEPARIN SODIUM 2500 UNIT(S): 10000 INJECTION INTRAVENOUS; SUBCUTANEOUS at 10:14

## 2024-10-23 RX ADMIN — Medication 0.5 MILLIGRAM(S): at 00:20

## 2024-10-23 RX ADMIN — MUPIROCIN 1 APPLICATION(S): 20 OINTMENT TOPICAL at 23:13

## 2024-10-23 RX ADMIN — HEPARIN SODIUM 1100 UNIT(S)/HR: 10000 INJECTION INTRAVENOUS; SUBCUTANEOUS at 19:31

## 2024-10-23 RX ADMIN — BUSPIRONE HYDROCHLORIDE 10 MILLIGRAM(S): 15 TABLET ORAL at 10:15

## 2024-10-23 NOTE — PROGRESS NOTE ADULT - SUBJECTIVE AND OBJECTIVE BOX
Chief Complaint: Patient is a 54y old  Female who presents with a chief complaint of Shock (21 Oct 2024 13:06)      Interval events:   - No acute events overnight, VSS, pt afebrile   - Pt downgraded out of CCU to floors   - Hgb 8.7 s/p 1 unit prbc on  10/21, guaic positive. VSS. on hep gtt for R subclavian vein stent thrombosis  - pt not able to provide any collateral, does not appear to be in any distress (appears to be her baseline)    ROS:   Patient denies any current chest pain, SOB, cough, f/c/n/v/d, abd pain, LE edema, myalgias, dysuria, HA, dizziness  All other review of systems is negative unless indicated above  ICU Vital Signs Last 24 Hrs  T(C): 36.8 (23 Oct 2024 08:00), Max: 37.1 (22 Oct 2024 18:26)  T(F): 98.3 (23 Oct 2024 08:00), Max: 98.7 (22 Oct 2024 18:26)  HR: 90 (23 Oct 2024 08:00) (85 - 91)  BP: 100/64 (23 Oct 2024 08:00) (84/60 - 104/62)  BP(mean): 76 (22 Oct 2024 17:21) (75 - 76)  ABP: --  ABP(mean): --  RR: 18 (23 Oct 2024 08:00) (18 - 20)  SpO2: 96% (23 Oct 2024 08:00) (96% - 100%)    O2 Parameters below as of 23 Oct 2024 08:00  Patient On (Oxygen Delivery Method): room air      Parameters below as of 21 Oct 2024 12:00  Patient On (Oxygen Delivery Method): room air    Constitutional: NAD, awake and alert  HEENT: PERRLA, EOMI, MMM  Respiratory: Breath sounds are clear bilaterally, No wheezing, rales or rhonchi  Cardiovascular: S1 and S2, RRR, no murmurs, gallops or rubs  Gastrointestinal: +BS, soft, non-tender, non-distended, no CVA tenderness  Extremities: No peripheral edema, +DP pulses b/l  Neurological: A&O x 1-2, no focal deficits  Musculoskeletal: 5/5 strength b/l upper and lower extremities  Skin: numerous unstageable ulcers to B/L heels sacrum and back; stage II to L ischium    Labs:  10-21 @ 08:23  Glucose 97 mg/dL  HCO3 25 mmol/L  Chloride 106 mmol/L  Sodium 141 mmol/L>   Potassium 3.6 mmol/L  Creatinine 5.75 mg/dL  Calcium 11.2 mg/dL  BUN 34 mg/dL  eGFR 8 mL/min/1.73m2  Anion gap 10 mmol/L    WBC 9.04  Hemoglobin 7.0  Hemoatocrit 22.9  Platelet count 157    PTT - ( 21 Oct 2024 08:23 )  PTT:58.4 sec    Cardiac testing:  Troponin I, High Sensitivity Result: 174.25 ng/L (10-21-24 @ 08:23)  Troponin I, High Sensitivity Result: 281.43 ng/L (10-19-24 @ 05:42)  Troponin I, High Sensitivity Result: 85.68 ng/L (10-18-24 @ 07:32)    12 Lead ECG:   Ventricular Rate 108 BPM    Atrial Rate 108 BPM    P-R Interval 162 ms    QRS Duration 84 ms    Q-T Interval 354 ms    QTC Calculation(Bazett) 474 ms    P Axis 19 degrees    R Axis 20 degrees    T Axis 127 degrees    Diagnosis Line Sinus tachycardia  Septal infarct , age undetermined  T wave abnormality, consider lateral ischemia  Abnormal ECG  No previous ECGs available  Confirmed by MD KATHIE, THERESA ([id number]) on 10/18/2024 9:15:05 PM (10-18-24 @ 08:40)    Micro:  UA 10/18 -- Trace LE, 8 WBCs, +proteinuria   BCx 10/18 x1 -- coagulase negative staph   BCx 10/20 x2 -- NGTD    Radiology:  < from: Xray Chest 1 View- PORTABLE-Urgent (10.18.24 @ 09:46) >  IMPRESSION: No acute finding.      < from: CT Head No Cont (10.18.24 @ 10:03) >  IMPRESSION:  No acute intracranial  hemorrhage or midline shift.    Right frontal temporal encephalomalacia/gliosis.    Extensive white matter small vessel ischemic disease/chronic infarct.    Chronic left thalamic infarct.    Generalized volume loss with ventricular dilatation.      < from: CT Chest No Cont (10.18.24 @ 10:03) >  IMPRESSION:  No CT evidence of pneumonia.      < from: CT Angio Chest PE Protocol w/ IV Cont (10.18.24 @ 12:40) >  IMPRESSION:  No pulmonary embolism.    Focal nonopacification of a portion of the right subclavian artery stent,   suspicious for thrombus. Correlate with Doppler imaging.    Enlarged myomatous uterus, with large pedunculated 14cm fibroid extending   up to the left hemiabdomen.  Recommend GYN evaluation.      < from: US Duplex Arterial Upper Ext Ltd, Right (10.18.24 @ 16:04) >  IMPRESSION:  Occluded or almost occluded medial portion of the right subclavian vein   stent.    < from: TTE W or WO Ultrasound Enhancing Agent (10.18.24 @ 13:20) >   1. Left ventricular cavity is small. Left ventricular systolic function is normal with an ejection fraction of 51 % by 3D with an ejection fraction visually estimated at 50 to 55 %. Regional wall motion abnormalities present.   2. Mid and apical anterior septum and mid and apical inferior septum are abnormal.   3. There is mild (grade 1) left ventricular diastolic dysfunction.   4. Normal right ventricular cavity size, with normal wall thickness, and normal right ventricular systolic function.   5. Left atrium is mildly dilated.   6. Mild mitral regurgitation.   7. Mild tricuspid regurgitation.   8. Estimated pulmonary artery systolic pressure is 35 mmHg, consistent with borderline pulmonary hypertension.   9. Mild mitral valve stenosis.  10. There is mild calcification of the mitral valve annulus.  11. Trileaflet aortic valve with normal systolic excursion. There is minimal thickening of the aortic valve leaflets.    < end of copied text >      Medications:  MEDICATIONS  (STANDING):  busPIRone 10 milliGRAM(s) Oral two times a day  chlorhexidine 4% Liquid 1 Application(s) Topical <User Schedule>  collagenase Ointment 1 Application(s) Topical daily  escitalopram 20 milliGRAM(s) Oral daily  heparin  Infusion. 900 Unit(s)/Hr (9 mL/Hr) IV Continuous <Continuous>  lacosamide 150 milliGRAM(s) Oral two times a day  pantoprazole  Injectable 40 milliGRAM(s) IV Push every 12 hours    MEDICATIONS  (PRN):  haloperidol    Injectable 5 milliGRAM(s) IV Push every 6 hours PRN agitation  heparin   Injectable 2500 Unit(s) IV Push every 6 hours PRN For aPTT between 40 - 57  heparin   Injectable 5500 Unit(s) IV Push every 6 hours PRN For aPTT less than 40  LORazepam   Injectable 1 milliGRAM(s) IV Push every 6 hours PRN Agitation

## 2024-10-23 NOTE — PROGRESS NOTE ADULT - ASSESSMENT
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), ESRD on HD M/W/F, R IJ VTE, CVA, HTN, recent discharge [facility] for MRSA Endocarditis on Vancomycin via PICC line (last day 10/18) presents from [facility] 10/18 for hypoxia, hypotension and lethargy  Afebrile on admission, on NRB > 4L NC, Hypotensive on pressors  WBC 12  Cr 6  UA negative, RVP negative  CTA without PE, R subclavian artery stent thrombosis  CTAP grossly negative for infectious etiology  TTE without obvious vegetation   BCx 10/18 Staph hominis and Staph capitis, CoNS 2 out of 4 bottles  BCx 10/20 negative    Prior Culture:  BCx 9/2 MRSA 2 out of 4  BCx 9/4 negative  BCx 9/24 negative    Antimicrobials:  Vancomycin 1000mg x1 (10/18) Dapto (10/19 --- 10/21)  cefepime  Injectable. 1000 every 24 hours (10/18 --- 10/21)    Impression:   #Sacral Ulcer  #Acute Hypoxic Respiratory Failure, resolved  #Hypotension, Lethargy  #Recent MRSA Endocarditis, completed treatment   #ESRD on HD  - completed vancomycin for endocarditis 10/18, even received additional cefepime and dapto till 10/21  - remains afebrile, no leukocytosis, so far infectious work up negative, BCx negative    Recommendations:  - monitor off antibiotic  - monitor temperature curve  - trend WBC  - side effects of antibiotic discussed, tolerating abx well so far  - Wound care appreciated; continue excellent wound care  - Surgery appreciated; no surgical intervention  - follow up Wound Culture (10/22) --- though unclear significance, likely will grow multiple organism but already had significant antibiotic in setting of endocarditis, likely need continued wound care     Clinical team may change from intravenous to oral antibiotics when the following criteria are met:   1. Patient is clinically improving/stable       a)	Improved signs and symptoms of infection from initial presentation       b)	Afebrile for 24 hours       c)	Leukocytosis trending towards normal range   2. Patient is tolerating oral intake   3. Initial/repeat blood cultures are negative OR do not need to wait for preliminary blood cultures to result    N/A

## 2024-10-23 NOTE — PROGRESS NOTE ADULT - SUBJECTIVE AND OBJECTIVE BOX
CHIEF COMPLAINT:    HPI:  ICU admit note    S:    Pt seen and examined  54-year-old patient with past medical history for antiphospholipid syndrome, end-stage renal disease on dialysis Monday Wednesday Friday, acute embolism and thrombosis of the right internal jugular vein, high blood pressure, stroke, endocarditis, high cholesterol, presents emergency department for hypoxia, hypotension, less responsiveness.  Patient is awake,  soft  smoking and  answers few questions.  Patient currently with no pain  .  [facility] called and informed that patient was found this morning satting 60%, blood pressure 87/55 and low temperature of 97.2.  Patient was sent to the ER for further evaluation.  Patient is a DNR, but   Not DNI    ICU called for consult    10/18: Hypotensive despite 1L IVF; add'l ordered, ESRD noted. Pressors ordered. Workup in progress.  (18 Oct 2024 11:35)    Consulted for abnormal echo. EF normal but small areas of regional wall motion abnormalities noted.  pt is not answering questioning on my history.  Per ICU team this is her baseline.    10/22/24 patient is feeling better , denies any chest pain or shortness of breath , but does not know where she is   10/23/24: still confused, no cardiac complaints    MEDICATIONS:  OUTPATIENT:  Home Medications:  busPIRone 10 mg oral tablet: 1 tab(s) orally 2 times a day (18 Oct 2024 11:16)  lacosamide 150 mg oral tablet: 1 tab(s) orally 2 times a day (18 Oct 2024 11:16)  Lexapro 20 mg oral tablet: 1 tab(s) orally once a day (18 Oct 2024 11:16)  Lipitor 40 mg oral tablet: 1 tab(s) orally once a day (at bedtime) (18 Oct 2024 11:16)  metoprolol succinate 25 mg oral capsule, extended release: 1 cap(s) orally once a day (18 Oct 2024 11:16)  miconazole 2% topical ointment: Apply topically to affected area 2 times a day apply to groin, perinium, buttocks (18 Oct 2024 11:16)  NIFEdipine (Eqv-Adalat CC) 30 mg oral tablet, extended release: 1 tab(s) orally once a day (18 Oct 2024 11:16)  Plaquenil 200 mg oral tablet: 2 tab(s) orally once a day (18 Oct 2024 11:16)  Polyethylene Glycol 3350: 17 billion vector genomes orally once a day (18 Oct 2024 11:16)  Protonix 40 mg oral delayed release tablet: 1 tab(s) orally once a day (18 Oct 2024 11:16)  Santyl 250 units/g topical ointment: Apply topically to affected area once a day apply to sacrum (18 Oct 2024 11:16)  senna (sennosides) 8.6 mg oral tablet: 1 tab(s) orally once a day (18 Oct 2024 11:16)  traMADol 50 mg oral tablet: 1 tab(s) orally every 8 hours as needed for  severe pain (18 Oct 2024 11:16)  traZODone 100 mg oral tablet: 1 tab(s) orally once a day (at bedtime) (18 Oct 2024 11:16)  Tylenol Caplet 325 mg oral tablet: 2 tab(s) orally every 6 hours as needed for fever and moderate pain (18 Oct 2024 11:16)  warfarin 6 mg oral tablet: 1 tab(s) orally once a day (18 Oct 2024 11:16)  Xanax 0.5 mg oral tablet: 1 tab(s) orally once a day (18 Oct 2024 11:16)    MEDICATIONS  (STANDING):  busPIRone 10 milliGRAM(s) Oral two times a day  chlorhexidine 4% Liquid 1 Application(s) Topical <User Schedule>  Dakins Solution - 1/4 Strength 1 Application(s) Topical every 12 hours  epoetin fredi-epbx (RETACRIT) Injectable 45627 Unit(s) IV Push <User Schedule>  escitalopram 20 milliGRAM(s) Oral daily  heparin  Infusion. 900 Unit(s)/Hr (9 mL/Hr) IV Continuous <Continuous>  lacosamide 150 milliGRAM(s) Oral two times a day  mupirocin 2% Nasal 1 Application(s) Both Nostrils two times a day  pantoprazole  Injectable 40 milliGRAM(s) IV Push every 12 hours  warfarin 6 milliGRAM(s) Oral daily      MEDICATIONS  (PRN):  haloperidol    Injectable 5 milliGRAM(s) IV Push every 6 hours PRN agitation  heparin   Injectable 2500 Unit(s) IV Push every 6 hours PRN For aPTT between 40 - 57  heparin   Injectable 5500 Unit(s) IV Push every 6 hours PRN For aPTT less than 40  LORazepam   Injectable 1 milliGRAM(s) IV Push every 6 hours PRN Agitation    Vital Signs Last 24 Hrs  T(C): 36.8 (23 Oct 2024 08:00), Max: 37.1 (22 Oct 2024 18:26)  T(F): 98.3 (23 Oct 2024 08:00), Max: 98.7 (22 Oct 2024 18:26)  HR: 90 (23 Oct 2024 08:00) (85 - 91)  BP: 100/64 (23 Oct 2024 08:00) (84/60 - 104/62)  BP(mean): 76 (22 Oct 2024 17:21) (75 - 76)  RR: 18 (23 Oct 2024 08:00) (18 - 20)  SpO2: 96% (23 Oct 2024 08:00) (96% - 100%)    Parameters below as of 23 Oct 2024 08:00  Patient On (Oxygen Delivery Method): room air      PHYSICAL EXAM:    Constitutional: NAD, awake  HEENT: PERR, EOMI,  No oral cyananosis.  Neck:  supple,  No JVD  Respiratory: Breath sounds are clear bilaterally, No wheezing, rales or rhonchi  Cardiovascular: S1 and S2, regular rate and rhythm, no Murmurs, gallops or rubs  Gastrointestinal: Bowel Sounds present, soft, nontender.   Extremities: No peripheral edema. No clubbing or cyanosis.  Vascular: 2+ peripheral pulses  Neurological: A/O x O forget ful   Musculoskeletal: no calf tenderness.  Skin: No rashes.    ===============================  ===============================  LABS: reviewed                            8.7    10.68 )-----------( 148      ( 23 Oct 2024 07:43 )             27.9     10-23    135  |  99  |  17  ----------------------------<  83  3.4[L]   |  29  |  3.92[H]    Ca    11.0[H]      23 Oct 2024 07:43  Phos  4.2     10-22  Mg     1.9     10-22    TPro  x   /  Alb  2.0[L]  /  TBili  x   /  DBili  x   /  AST  x   /  ALT  x   /  AlkPhos  x   10-22    - TroponinI hsT: <-174.25, <-281.43, <-85.68    Radiology/EKG/TTE: reviewed     EKG:  sinus tachycardia, septal infarct, nonspecific T wave inversions laterally.        < from: TTE W or WO Ultrasound Enhancing Agent (10.18.24 @ 13:20) >  CONCLUSIONS:      1. Left ventricular cavity is small. Left ventricular systolic function is normal with an ejection fraction of 51 % by 3D   with an ejection fraction visually estimated at 50 to 55 %. Regional wall motion abnormalities present.   2. Mid and apical anterior septum and mid and apical inferior septum are abnormal.   3. There is mild (grade 1) left ventricular diastolic dysfunction.   4. Normal right ventricular cavity size, with normal wall thickness, and normal right ventricular systolic function.   5. Left atrium is mildly dilated.   6. Mild mitral regurgitation.   7. Mild tricuspid regurgitation.   8. Estimated pulmonary artery systolic pressure is 35 mmHg, consistent with borderline pulmonary hypertension.   9. Mild mitral valve stenosis.  10. There is mild calcification of the mitral valve annulus.  11. Trileaflet aortic valve with normal systolic excursion. There is minimal thickening of the aortic valve leaflets.    < end of copied text >     monitor sinus rhythm tachycardiac

## 2024-10-23 NOTE — PROGRESS NOTE ADULT - SUBJECTIVE AND OBJECTIVE BOX
Date of Service:10-23-24 @ 12:16  Interval History/ROS: Afebrile overnight, no leukocytosis, BCx negative, Limited ROS due to mental status      REVIEW OF SYSTEMS  [  x] ROS unobtainable because:  mental status  [  ] All other systems negative except as noted below    Constitutional:  [ ] fever [ ] chills  [ ] weight loss  [ ]night sweat  [ ]poor appetite/PO intake [ ]fatigue   Skin:  [ ] rash [ ] phlebitis	  Eyes: [ ] icterus [ ] pain  [ ] discharge	  ENMT: [ ] sore throat  [ ] thrush [ ] ulcers [ ] exudates [ ]anosmia  Respiratory: [ ] dyspnea [ ] hemoptysis [ ] cough [ ] sputum	  Cardiovascular:  [ ] chest pain [ ] palpitations [ ] edema	  Gastrointestinal:  [ ] nausea [ ] vomiting [ ] diarrhea [ ] constipation [ ] pain	  Genitourinary:  [ ] dysuria [ ] frequency [ ] hematuria [ ] discharge [ ] flank pain  [ ] incontinence  Musculoskeletal:  [ ] myalgias [ ] arthralgias [ ] arthritis  [ ] back pain  Neurological:  [ ] headache [ ] weakness [ ] seizures  [ ] confusion/altered mental status    Allergies  No Known Allergies        ANTIMICROBIALS:          OTHER MEDS: MEDICATIONS  (STANDING):  busPIRone 10 two times a day  epoetin fredi-epbx (RETACRIT) Injectable 04629 <User Schedule>  escitalopram 20 daily  haloperidol    Injectable 5 every 6 hours PRN  heparin   Injectable 2500 every 6 hours PRN  heparin   Injectable 5500 every 6 hours PRN  heparin  Infusion. 900 <Continuous>  HYDROmorphone  Injectable 0.5 every 8 hours PRN  lacosamide 150 two times a day  LORazepam   Injectable 1 every 6 hours PRN  pantoprazole  Injectable 40 every 12 hours  warfarin 6 daily      Vital Signs Last 24 Hrs  T(F): 98.3 (10-23-24 @ 08:00), Max: 99.7 (10-18-24 @ 20:03)    Vital Signs Last 24 Hrs  HR: 90 (10-23-24 @ 08:00) (81 - 96)  BP: 100/64 (10-23-24 @ 08:00) (84/60 - 104/62)  RR: 18 (10-23-24 @ 08:00)  SpO2: 96% (10-23-24 @ 08:00) (96% - 100%)  Wt(kg): --    EXAM:    Constitutional: NAD  Head/Eyes: no icterus  LUNGS:  crackles  CVS:  regular rhythm  Abd:  soft, non-tender; non-distended  Ext:  no edema  Vascular:  IV site no erythema tenderness or discharge  Neuro: AAO X 1    Labs:                        8.7    10.68 )-----------( 148      ( 23 Oct 2024 07:43 )             27.9     10-23    135  |  99  |  17  ----------------------------<  83  3.4[L]   |  29  |  3.92[H]    Ca    11.0[H]      23 Oct 2024 07:43  Phos  4.2     10-22  Mg     1.9     10-22    TPro  x   /  Alb  2.0[L]  /  TBili  x   /  DBili  x   /  AST  x   /  ALT  x   /  AlkPhos  x   10-22      WBC Trend:  WBC Count: 10.68 (10-23-24 @ 07:43)  WBC Count: 10.03 (10-22-24 @ 21:11)  WBC Count: 9.88 (10-22-24 @ 06:14)  WBC Count: 10.32 (10-21-24 @ 16:00)      Creatine Trend:  Creatinine: 3.92 (10-23)  Creatinine: 7.14 (10-22)  Creatinine: 5.75 (10-21)  Creatinine: 4.26 (10-20)      Liver Biochemical Testing Trend:  Alanine Aminotransferase (ALT/SGPT): 27 (10-21)  Alanine Aminotransferase (ALT/SGPT): 35 (10-20)  Alanine Aminotransferase (ALT/SGPT): 42 (10-18)  Aspartate Aminotransferase (AST/SGOT): 29 (10-21-24 @ 08:23)  Aspartate Aminotransferase (AST/SGOT): 32 (10-20-24 @ 06:47)  Aspartate Aminotransferase (AST/SGOT): 57 (10-18-24 @ 07:32)  Bilirubin Total: 0.6 (10-21)  Bilirubin Total: 0.8 (10-20)  Bilirubin Total: 0.6 (10-18)      Trend LDH      Urinalysis Basic - ( 23 Oct 2024 07:43 )    Color: x / Appearance: x / SG: x / pH: x  Gluc: 83 mg/dL / Ketone: x  / Bili: x / Urobili: x   Blood: x / Protein: x / Nitrite: x   Leuk Esterase: x / RBC: x / WBC x   Sq Epi: x / Non Sq Epi: x / Bacteria: x        MICROBIOLOGY:  Vancomycin Level, Trough: 24.1 (10-20 @ 06:47)    MRSA PCR Result.: Detected (10-21-24 @ 13:32)      Culture - Blood (collected 20 Oct 2024 06:47)  Source: .Blood BLOOD  Preliminary Report:    No growth at 48 Hours    Culture - Blood (collected 20 Oct 2024 06:47)  Source: .Blood BLOOD  Preliminary Report:    No growth at 48 Hours    Culture - Blood (collected 18 Oct 2024 09:01)  Source: .Blood BLOOD  Final Report:    Growth in aerobic bottle: Staphylococcus hominis    Growth in anaerobic bottle: Staphylococcus capitis    Isolation of Coagulase negative Staphylococcus from single blood culture    sets may represent    contamination. Contact the Microbiology Department at [phone] if    susceptibility testing is needed.    clinically indicated.    Direct identification is available within approximately 3-5    hours either by Blood Panel Multiplexed PCR or Direct    MALDI-TOF. Details: [email or web address]  Organism: Blood Culture PCR  Organism: Blood Culture PCR    Sensitivities:      Method Type: PCR      -  Coagulase negative Staphylococcus: Detec    Culture - Blood (collected 18 Oct 2024 07:32)  Source: .Blood BLOOD  Preliminary Report:    No growth at 4 days    Urinalysis with Rflx Culture (collected 18 Oct 2024 07:30)      RADIOLOGY:  imaging below personally reviewed

## 2024-10-23 NOTE — PROGRESS NOTE ADULT - ASSESSMENT
53 y/o female PMH APS on warfarin, ESRD on HD, ?lupus on Plaquenil, CVA, right internal jugular vein thrombus, functional quadriplegia, seizure d/o on Vimpat, recent hospitalization at [facility] for MRSA endocarditis (finished vanc on 10/18), presented from [place] for hypotension, hypoxia and poor responsiveness/lethargy   Remained hypotensive in ED despite 2.5L IVF, started on pressors and admitted to CCU     #Hypotension/shock multifactorial - hypovolemic/medication   #Acute toxic encephalopathy likely due to meds/polypharmacy  #Acute hypoxic respiratory failure - resolved  - s/p CCU care requiring pressors for BP support, now off  - CTH negative for CVA/hemorrhage, notes Right frontal temporal encephalomalacia/gliosis, Extensive white matter small vessel ischemic disease/chronic left thalamic infarct.  - CTA chest negative for PE or PNA  - s/p supplemental O2, monitor pulse ox  - continue Haldol/Ativan PRN for agitation   - home BB + nifedipine held given shock, resume as BP allows, bp still slightly too low to resume. Will hold for now  - PT evaluation    #Suspected GI bleed   #Acute blood loss anemia   - Hgb 9.6 -> 7.0 -> s/p 1 unit now 8.3 on hep gtt  - GI consulted Dr. Galicia: no EGD or c scope planned/necessary  - FOBT + but no active melena/hematochezia. No urgent need for invasive intervention per GI   - continue PPI BID  - continue to trend H/H, transfuse PRN    #APS on warfarin   #Occluded right subclavian vein stent   - holding Warfarin until H/H stable, continue hep gtt today, if H/H remains stable, start coumadin tomorrow  - INR 1.43 on arrival, subtherapeutic at 1.19 today  - hgb has remained stable w/o e/o bleeding on hep gtt  - cont hpe gtt/bridge to coumadin tonite, continue until inr therapeutic  - Arterial duplex: Occluded or almost occluded medial portion of the right subclavian vein stent -> vascular consult: No acute surgical intervention needed, unlikely stent completely occulded based on physical exam, resume a/c    #Elevated troponin  - Troponin 86 -> 281 -> 174  - TTE as above with preserved LVEF, grade 1 LV diastolic dysfunction, +regional WMAs present  - Cardiology consulted : not candidate for ischemic evaluation-> cont to tx medically, ASA, statin    #Recent MRSA endocarditis   - completed tx with Vanco via PICC on 10/18  - TTE without vegetations   - BCx 10/18 Staph hominis and Staph capitis, CoNS 2 out of 4 bottles  - BCx 10/20 negative  - ID consulted , BCx from 10/18 likely contaminant, IV abx discontinued. Monitor off abx     #ESRD on HD   - HD per renal     #Seizure d/o   #Hx CVA with dysphagia, functional quadriplegia   - continue vimpat   - Neurology consulted, unable to perform EEG due to lack of cooperation from pt  - soft and bite sized diet    #Numerous bed sores/unstageable Sacral decubitus ulcer  - wound care  - culture collected  - surgery consult : recc chemical debridement , no sx debridement required at this time  - add dilaudid prn for severe pain    #Code status   - palliative care following  - pt is DNR, OK to intubate     #DVT ppx   - on hep gtt

## 2024-10-23 NOTE — PROVIDER CONTACT NOTE (OTHER) - SITUATION
notified service; spoke to Bola
notified/spoke to Dr. Dahiana YUSUF aware
notified surgical resident; spoke to Page
Pt aptt 90.9s

## 2024-10-23 NOTE — PROGRESS NOTE ADULT - PROBLEM SELECTOR PLAN 1
-Echo w/ normal EF w/ apical anteroseptal & apical inferior WMA  -Trop mildly elevated likely in the setting of sepsis.  ECG w/ nonspecific changes.  -pt unable to provide history-h/o CVA & other issues w/ poor cognitive function.    -Trop elevation likely related to sepsis - trended down  -pt is a poor candidate for ischemic workup.  -cont. to treat medically. can have stress test op when her menta, status improves  - start asa and statin, add low dose BB when BP improves     pt. is stable from cardiac standpoint, can followup op with Dr. Rosas, consult prn
-Echo w/ normal EF w/ apical anteroseptal & apical inferior WMA  -Trop mildly elevated likely in the setting of sepsis.  ECG w/ nonspecific changes.  -pt unable to provide history-h/o CVA & other issues w/ poor cognitive function.    -Trop elevation likely related to sepsis.  -pt is a poor candidate for ischemic workup.  -cont. to treat medically. If mental status improves will consider stress testing  but this could be deferred to outpatient setting.  continue ecotrin , statin , BB  resume atorvastatin 40 mg , ecotrin ,  metoprolol succinate 25 mg po daily

## 2024-10-24 LAB
-  AMPICILLIN/SULBACTAM: SIGNIFICANT CHANGE UP
-  AMPICILLIN/SULBACTAM: SIGNIFICANT CHANGE UP
-  AMPICILLIN: SIGNIFICANT CHANGE UP
-  AMPICILLIN: SIGNIFICANT CHANGE UP
-  AZTREONAM: SIGNIFICANT CHANGE UP
-  AZTREONAM: SIGNIFICANT CHANGE UP
-  CEFAZOLIN: SIGNIFICANT CHANGE UP
-  CEFAZOLIN: SIGNIFICANT CHANGE UP
-  CEFEPIME: SIGNIFICANT CHANGE UP
-  CEFEPIME: SIGNIFICANT CHANGE UP
-  CEFTRIAXONE: SIGNIFICANT CHANGE UP
-  CEFTRIAXONE: SIGNIFICANT CHANGE UP
-  CIPROFLOXACIN: SIGNIFICANT CHANGE UP
-  CIPROFLOXACIN: SIGNIFICANT CHANGE UP
-  ERTAPENEM: SIGNIFICANT CHANGE UP
-  ERTAPENEM: SIGNIFICANT CHANGE UP
-  GENTAMICIN: SIGNIFICANT CHANGE UP
-  GENTAMICIN: SIGNIFICANT CHANGE UP
-  IMIPENEM: SIGNIFICANT CHANGE UP
-  LEVOFLOXACIN: SIGNIFICANT CHANGE UP
-  LEVOFLOXACIN: SIGNIFICANT CHANGE UP
-  MEROPENEM: SIGNIFICANT CHANGE UP
-  MEROPENEM: SIGNIFICANT CHANGE UP
-  PIPERACILLIN/TAZOBACTAM: SIGNIFICANT CHANGE UP
-  PIPERACILLIN/TAZOBACTAM: SIGNIFICANT CHANGE UP
-  TOBRAMYCIN: SIGNIFICANT CHANGE UP
-  TOBRAMYCIN: SIGNIFICANT CHANGE UP
-  TRIMETHOPRIM/SULFAMETHOXAZOLE: SIGNIFICANT CHANGE UP
-  TRIMETHOPRIM/SULFAMETHOXAZOLE: SIGNIFICANT CHANGE UP
ANION GAP SERPL CALC-SCNC: 8 MMOL/L — SIGNIFICANT CHANGE UP (ref 5–17)
APTT BLD: 88.9 SEC — HIGH (ref 24.5–35.6)
APTT BLD: 90.6 SEC — HIGH (ref 24.5–35.6)
BLD GP AB SCN SERPL QL: SIGNIFICANT CHANGE UP
BUN SERPL-MCNC: 30 MG/DL — HIGH (ref 7–23)
CALCIUM SERPL-MCNC: 11.6 MG/DL — HIGH (ref 8.5–10.1)
CHLORIDE SERPL-SCNC: 102 MMOL/L — SIGNIFICANT CHANGE UP (ref 96–108)
CO2 SERPL-SCNC: 28 MMOL/L — SIGNIFICANT CHANGE UP (ref 22–31)
CREAT SERPL-MCNC: 5.69 MG/DL — HIGH (ref 0.5–1.3)
CULTURE RESULTS: ABNORMAL
EGFR: 8 ML/MIN/1.73M2 — LOW
GLUCOSE SERPL-MCNC: 109 MG/DL — HIGH (ref 70–99)
HCT VFR BLD CALC: 27.6 % — LOW (ref 34.5–45)
HGB BLD-MCNC: 8.6 G/DL — LOW (ref 11.5–15.5)
INR BLD: 1.15 RATIO — SIGNIFICANT CHANGE UP (ref 0.85–1.16)
MCHC RBC-ENTMCNC: 31 PG — SIGNIFICANT CHANGE UP (ref 27–34)
MCHC RBC-ENTMCNC: 31.2 GM/DL — LOW (ref 32–36)
MCV RBC AUTO: 99.6 FL — SIGNIFICANT CHANGE UP (ref 80–100)
METHOD TYPE: SIGNIFICANT CHANGE UP
METHOD TYPE: SIGNIFICANT CHANGE UP
ORGANISM # SPEC MICROSCOPIC CNT: ABNORMAL
ORGANISM # SPEC MICROSCOPIC CNT: ABNORMAL
ORGANISM # SPEC MICROSCOPIC CNT: SIGNIFICANT CHANGE UP
PLATELET # BLD AUTO: 140 K/UL — LOW (ref 150–400)
POTASSIUM SERPL-MCNC: 3.7 MMOL/L — SIGNIFICANT CHANGE UP (ref 3.5–5.3)
POTASSIUM SERPL-SCNC: 3.7 MMOL/L — SIGNIFICANT CHANGE UP (ref 3.5–5.3)
PROTHROM AB SERPL-ACNC: 13.2 SEC — SIGNIFICANT CHANGE UP (ref 9.9–13.4)
RBC # BLD: 2.77 M/UL — LOW (ref 3.8–5.2)
RBC # FLD: 21.7 % — HIGH (ref 10.3–14.5)
SODIUM SERPL-SCNC: 138 MMOL/L — SIGNIFICANT CHANGE UP (ref 135–145)
SPECIMEN SOURCE: SIGNIFICANT CHANGE UP
WBC # BLD: 14.78 K/UL — HIGH (ref 3.8–10.5)
WBC # FLD AUTO: 14.78 K/UL — HIGH (ref 3.8–10.5)

## 2024-10-24 PROCEDURE — 99233 SBSQ HOSP IP/OBS HIGH 50: CPT | Mod: FS

## 2024-10-24 PROCEDURE — 99231 SBSQ HOSP IP/OBS SF/LOW 25: CPT

## 2024-10-24 PROCEDURE — 99233 SBSQ HOSP IP/OBS HIGH 50: CPT

## 2024-10-24 RX ORDER — CINACALCET 30 MG/1
30 TABLET, FILM COATED ORAL DAILY
Refills: 0 | Status: DISCONTINUED | OUTPATIENT
Start: 2024-10-24 | End: 2024-10-29

## 2024-10-24 RX ORDER — CEFTRIAXONE SODIUM 10 G
2000 VIAL (EA) INJECTION EVERY 24 HOURS
Refills: 0 | Status: DISCONTINUED | OUTPATIENT
Start: 2024-10-24 | End: 2024-10-24

## 2024-10-24 RX ORDER — MEROPENEM 1 G/30ML
500 INJECTION INTRAVENOUS EVERY 24 HOURS
Refills: 0 | Status: DISCONTINUED | OUTPATIENT
Start: 2024-10-24 | End: 2024-10-24

## 2024-10-24 RX ORDER — MEROPENEM 1 G/30ML
500 INJECTION INTRAVENOUS EVERY 24 HOURS
Refills: 0 | Status: DISCONTINUED | OUTPATIENT
Start: 2024-10-24 | End: 2024-10-29

## 2024-10-24 RX ORDER — PANTOPRAZOLE SODIUM 40 MG/1
40 TABLET, DELAYED RELEASE ORAL EVERY 12 HOURS
Refills: 0 | Status: DISCONTINUED | OUTPATIENT
Start: 2024-10-24 | End: 2024-10-29

## 2024-10-24 RX ADMIN — Medication 2000 MILLIGRAM(S): at 12:42

## 2024-10-24 RX ADMIN — Medication 1 APPLICATION(S): at 18:47

## 2024-10-24 RX ADMIN — PANTOPRAZOLE SODIUM 40 MILLIGRAM(S): 40 TABLET, DELAYED RELEASE ORAL at 12:42

## 2024-10-24 RX ADMIN — HEPARIN SODIUM 1100 UNIT(S)/HR: 10000 INJECTION INTRAVENOUS; SUBCUTANEOUS at 01:22

## 2024-10-24 RX ADMIN — LACOSAMIDE 150 MILLIGRAM(S): 100 TABLET ORAL at 22:57

## 2024-10-24 RX ADMIN — HEPARIN SODIUM 1100 UNIT(S)/HR: 10000 INJECTION INTRAVENOUS; SUBCUTANEOUS at 19:47

## 2024-10-24 RX ADMIN — HEPARIN SODIUM 1100 UNIT(S)/HR: 10000 INJECTION INTRAVENOUS; SUBCUTANEOUS at 07:43

## 2024-10-24 RX ADMIN — ALBUMIN HUMAN 200 MILLILITER(S): 50 SOLUTION INTRAVENOUS at 16:09

## 2024-10-24 RX ADMIN — Medication 40 MILLIGRAM(S): at 22:57

## 2024-10-24 RX ADMIN — LACOSAMIDE 150 MILLIGRAM(S): 100 TABLET ORAL at 12:42

## 2024-10-24 RX ADMIN — Medication 81 MILLIGRAM(S): at 17:08

## 2024-10-24 RX ADMIN — MUPIROCIN 1 APPLICATION(S): 20 OINTMENT TOPICAL at 23:01

## 2024-10-24 RX ADMIN — CHLORHEXIDINE GLUCONATE 1 APPLICATION(S): 40 SOLUTION TOPICAL at 06:11

## 2024-10-24 RX ADMIN — ESCITALOPRAM 20 MILLIGRAM(S): 10 TABLET, FILM COATED ORAL at 17:09

## 2024-10-24 RX ADMIN — Medication 1 APPLICATION(S): at 23:00

## 2024-10-24 RX ADMIN — BUSPIRONE HYDROCHLORIDE 10 MILLIGRAM(S): 15 TABLET ORAL at 17:08

## 2024-10-24 RX ADMIN — ALBUMIN HUMAN 200 MILLILITER(S): 50 SOLUTION INTRAVENOUS at 15:17

## 2024-10-24 RX ADMIN — ALBUMIN HUMAN 200 MILLILITER(S): 50 SOLUTION INTRAVENOUS at 13:17

## 2024-10-24 RX ADMIN — PANTOPRAZOLE SODIUM 40 MILLIGRAM(S): 40 TABLET, DELAYED RELEASE ORAL at 22:57

## 2024-10-24 RX ADMIN — HEPARIN SODIUM 1100 UNIT(S)/HR: 10000 INJECTION INTRAVENOUS; SUBCUTANEOUS at 13:26

## 2024-10-24 RX ADMIN — Medication 6 MILLIGRAM(S): at 23:53

## 2024-10-24 RX ADMIN — Medication 1 MILLIGRAM(S): at 12:42

## 2024-10-24 RX ADMIN — HEPARIN SODIUM 1100 UNIT(S)/HR: 10000 INJECTION INTRAVENOUS; SUBCUTANEOUS at 11:59

## 2024-10-24 RX ADMIN — BUSPIRONE HYDROCHLORIDE 10 MILLIGRAM(S): 15 TABLET ORAL at 23:53

## 2024-10-24 RX ADMIN — ERYTHROPOIETIN 10000 UNIT(S): 10000 INJECTION, SOLUTION INTRAVENOUS; SUBCUTANEOUS at 14:29

## 2024-10-24 RX ADMIN — MEROPENEM 500 MILLIGRAM(S): 1 INJECTION INTRAVENOUS at 18:47

## 2024-10-24 RX ADMIN — ALBUMIN HUMAN 200 MILLILITER(S): 50 SOLUTION INTRAVENOUS at 14:25

## 2024-10-24 NOTE — PROGRESS NOTE ADULT - ASSESSMENT
t is a 54-year-old female with past medical history for antiphospholipid syndrome, end-stage renal disease on dialysis Monday Wednesday Friday, acute embolism and thrombosis of the right internal jugular vein, high blood pressure, stroke, endocarditis, high cholesterol, presents emergency department for hypoxia, hypotension, less responsiveness, satting 60%, hypotensive at [facility]. Patient is currently admitted to ICU for acute septic shock in setting acute hypoxic respiratory failure. HCP is listed as Toribio Villalobos. Palliative medicine is consulted for further assistance with GOC.     GOC/ACP  Capacity: Pt does not have capacity for medical decision making   HCP/Surrogate: Toribio Villalobos - pt's boyfriend, [phone] states he is HCP, SW currently attempting to obtain documentation from prior rehab [facility] or [facility]. Additionally, patient does not have any other family members, [facility], Toribio Wilfredo would be the surrogate decision maker.   Code Status: DNR/trial of NIV and/or intubation and medical ventilation   MOLST: completed 10/21/24  Dispo Plan: continue current treatment, ongoing GOC, likely return to [facility]       Process of Care  --Reviewed dx/treatment problems and alignment with Goals of Care    Physical Aspects of Care  --Pain  no current pain symptoms  continue current regimen     --Bowel Regimen  denies constipation  risk for constipation d/t immobility  goal of 1 soft BM daily or every other day  - prn suppository     --Dyspnea  No SOB at this time  comfortable and in NAD    --Nausea Vomiting  denies    --Weakness  PT as tolerated     Psychological and Psychiatric Aspects of Care:   --Greif/Bereavment: emotional support provided  --Hx of psychiatric dx: none  -Pastoral Care Available PRN     Social Aspects of Care  -SW involved     Cultural Aspects  -Primary Language: English    Goals of Care:     We discussed Palliative Care team being a supportive team when a patient has ongoing illnesses.  We also discussed that it is not an end of life care service, but can help navigate symptoms and emotional support througout their hospital stay here.      Ethical and Legal Aspects:   NA        Discussed With: Case coordinated with attending and SW and RN     Time Spent: 90 minutes including the care, coordination and counseling of this patient, 50% of which was spent coordinating and counseling.    Palliative medicine will sign off at this time, patient appears to be hemodynamically stable, mentation at baseline, symptoms well managed. Goal is to return to rehab facility once stable for DC. Please reenage our team for any further concerns.

## 2024-10-24 NOTE — PROGRESS NOTE ADULT - ASSESSMENT
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), ESRD on HD M/W/F, R IJ VTE, CVA, HTN, recent discharge [facility] for MRSA Endocarditis on Vancomycin via PICC line (last day 10/18) presents from [facility] 10/18 for hypoxia, hypotension and lethargy  Afebrile on admission, on NRB > 4L NC, Hypotensive on pressors  WBC 12  Cr 6  UA negative, RVP negative  CTA without PE, R subclavian artery stent thrombosis  CTAP grossly negative for infectious etiology  TTE without obvious vegetation   BCx 10/18 Staph hominis and Staph capitis, CoNS 2 out of 4 bottles  BCx 10/20 negative  Wound culture 10/22 with Proteus and Kleb, pending sensi    Prior Culture:  BCx 9/2 MRSA 2 out of 4  BCx 9/4 negative  BCx 9/24 negative    Antimicrobials:  Vancomycin 1000mg x1 (10/18) Dapto (10/19 --- 10/21)  cefepime  Injectable. 1000 every 24 hours (10/18 --- 10/21)  CTX 2G q24 910/24 --- )    Impression:   #Sacral Ulcer, Polymicrobial Infection  #Acute Hypoxic Respiratory Failure, resolved  #Hypotension, Lethargy  #Recent MRSA Endocarditis, completed treatment   #ESRD on HD  - completed vancomycin for endocarditis 10/18, even received additional cefepime and dapto till 10/21  - Wound culture with Kleb and Proteus, will empirically treat for SSTI pending jonna    Recommendations:  - start CTX 2G q24  - monitor temperature curve  - trend WBC  - side effects of antibiotic discussed, tolerating abx well so far  - follow up Kleb and Proteus sensi; hoping for oral options  - Wound care appreciated; continue excellent wound care  - Surgery appreciated; no surgical intervention    Clinical team may change from intravenous to oral antibiotics when the following criteria are met:   1. Patient is clinically improving/stable       a)	Improved signs and symptoms of infection from initial presentation       b)	Afebrile for 24 hours       c)	Leukocytosis trending towards normal range   2. Patient is tolerating oral intake   3. Initial/repeat blood cultures are negative OR do not need to wait for preliminary blood cultures to result    Cannot advise changing to oral antibiotic therapy until culture sensitivity is available.

## 2024-10-24 NOTE — PROGRESS NOTE ADULT - SUBJECTIVE AND OBJECTIVE BOX
Date of Service:10-24-24 @ 11:16  Interval History/ROS: Afebrile overnight, Limited ROS due to mental status, Wound culture with Kleb and Proteus, WBC uptrending      REVIEW OF SYSTEMS  [  x] ROS unobtainable because:  mental status  [  ] All other systems negative except as noted below    Constitutional:  [ ] fever [ ] chills  [ ] weight loss  [ ]night sweat  [ ]poor appetite/PO intake [ ]fatigue   Skin:  [ ] rash [ ] phlebitis	  Eyes: [ ] icterus [ ] pain  [ ] discharge	  ENMT: [ ] sore throat  [ ] thrush [ ] ulcers [ ] exudates [ ]anosmia  Respiratory: [ ] dyspnea [ ] hemoptysis [ ] cough [ ] sputum	  Cardiovascular:  [ ] chest pain [ ] palpitations [ ] edema	  Gastrointestinal:  [ ] nausea [ ] vomiting [ ] diarrhea [ ] constipation [ ] pain	  Genitourinary:  [ ] dysuria [ ] frequency [ ] hematuria [ ] discharge [ ] flank pain  [ ] incontinence  Musculoskeletal:  [ ] myalgias [ ] arthralgias [ ] arthritis  [ ] back pain  Neurological:  [ ] headache [ ] weakness [ ] seizures  [ ] confusion/altered mental status    Allergies  No Known Allergies        ANTIMICROBIALS:    cefTRIAXone Injectable. 2000 every 24 hours        OTHER MEDS: MEDICATIONS  (STANDING):  aspirin  chewable 81 daily  atorvastatin 40 at bedtime  busPIRone 10 two times a day  epoetin fredi-epbx (RETACRIT) Injectable 79101 <User Schedule>  escitalopram 20 daily  haloperidol    Injectable 5 every 6 hours PRN  heparin   Injectable 2500 every 6 hours PRN  heparin   Injectable 5500 every 6 hours PRN  heparin  Infusion. 900 <Continuous>  HYDROmorphone  Injectable 0.5 every 8 hours PRN  lacosamide 150 two times a day  LORazepam   Injectable 1 every 6 hours PRN  pantoprazole  Injectable 40 every 12 hours  warfarin 6 daily      Vital Signs Last 24 Hrs  T(F): 99.6 (10-24-24 @ 07:27), Max: 99.7 (10-18-24 @ 20:03)    Vital Signs Last 24 Hrs  HR: 102 (10-24-24 @ 07:27) (100 - 106)  BP: 95/68 (10-24-24 @ 07:27) (95/68 - 110/60)  RR: 18 (10-24-24 @ 07:27)  SpO2: 100% (10-24-24 @ 07:27) (97% - 100%)  Wt(kg): --    EXAM:    Constitutional: NAD  Head/Eyes: no icterus  LUNGS:  crackles  CVS:  regular rhythm  Abd:  soft, non-tender; non-distended  Ext:  no edema  Vascular:  IV site no erythema tenderness or discharge  Neuro: AAO X 1    Labs:                        8.6    14.78 )-----------( 140      ( 24 Oct 2024 07:22 )             27.6     10-24    138  |  102  |  30[H]  ----------------------------<  109[H]  3.7   |  28  |  5.69[H]    Ca    11.6[H]      24 Oct 2024 07:22        WBC Trend:  WBC Count: 14.78 (10-24-24 @ 07:22)  WBC Count: 10.68 (10-23-24 @ 07:43)  WBC Count: 10.03 (10-22-24 @ 21:11)  WBC Count: 9.88 (10-22-24 @ 06:14)      Creatine Trend:  Creatinine: 5.69 (10-24)  Creatinine: 3.92 (10-23)  Creatinine: 7.14 (10-22)  Creatinine: 5.75 (10-21)      Liver Biochemical Testing Trend:  Alanine Aminotransferase (ALT/SGPT): 27 (10-21)  Alanine Aminotransferase (ALT/SGPT): 35 (10-20)  Alanine Aminotransferase (ALT/SGPT): 42 (10-18)  Aspartate Aminotransferase (AST/SGOT): 29 (10-21-24 @ 08:23)  Aspartate Aminotransferase (AST/SGOT): 32 (10-20-24 @ 06:47)  Aspartate Aminotransferase (AST/SGOT): 57 (10-18-24 @ 07:32)  Bilirubin Total: 0.6 (10-21)  Bilirubin Total: 0.8 (10-20)  Bilirubin Total: 0.6 (10-18)      Trend LDH      Urinalysis Basic - ( 24 Oct 2024 07:22 )    Color: x / Appearance: x / SG: x / pH: x  Gluc: 109 mg/dL / Ketone: x  / Bili: x / Urobili: x   Blood: x / Protein: x / Nitrite: x   Leuk Esterase: x / RBC: x / WBC x   Sq Epi: x / Non Sq Epi: x / Bacteria: x        MICROBIOLOGY:  Vancomycin Level, Trough: 24.1 (10-20 @ 06:47)    MRSA PCR Result.: Detected (10-21-24 @ 13:32)      Culture - Blood (collected 20 Oct 2024 06:47)  Source: .Blood BLOOD  Preliminary Report:    No growth at 72 Hours    Culture - Blood (collected 20 Oct 2024 06:47)  Source: .Blood BLOOD  Preliminary Report:    No growth at 72 Hours    Culture - Blood (collected 18 Oct 2024 09:01)  Source: .Blood BLOOD  Final Report:    Growth in aerobic bottle: Staphylococcus hominis    Growth in anaerobic bottle: Staphylococcus capitis    Isolation of Coagulase negative Staphylococcus from single blood culture    sets may represent    contamination. Contact the Microbiology Department at [phone] if    susceptibility testing is needed.    clinically indicated.    Direct identification is available within approximately 3-5    hours either by Blood Panel Multiplexed PCR or Direct    MALDI-TOF. Details: [email or web address]  Organism: Blood Culture PCR  Organism: Blood Culture PCR    Sensitivities:      Method Type: PCR      -  Coagulase negative Staphylococcus: Detec    Culture - Blood (collected 18 Oct 2024 07:32)  Source: .Blood BLOOD  Final Report:    No growth at 5 days    Urinalysis with Rflx Culture (collected 18 Oct 2024 07:30)    RADIOLOGY:  imaging below personally reviewed

## 2024-10-24 NOTE — PROGRESS NOTE ADULT - ASSESSMENT
55 y/o female PMH APS on warfarin, ESRD on HD, ?lupus on Plaquenil, CVA, right internal jugular vein thrombus, functional quadriplegia, seizure d/o on Vimpat, recent hospitalization at [facility] for MRSA endocarditis (finished vanc on 10/18), presented from [facility] for hypotension, hypoxia and poor responsiveness/lethargy   Remained hypotensive in ED despite 2.5L IVF, started on pressors and admitted to CCU     #Hypotension/shock multifactorial - hypovolemic/medication   #Acute toxic encephalopathy likely due to meds/polypharmacy  #Acute hypoxic respiratory failure - resolved  - s/p CCU care requiring pressors for BP support, now off  - CTH negative for CVA/hemorrhage, notes Right frontal temporal encephalomalacia/gliosis, Extensive white matter small vessel ischemic disease/chronic left thalamic infarct.  - CTA chest negative for PE or PNA  - s/p supplemental O2, monitor pulse ox  - continue Haldol/Ativan PRN for agitation   - home BB + nifedipine held given shock, resume as BP allows, bp still slightly too low to resume. Will hold for now  - PT evaluation      #Suspected GI bleed   #Acute blood loss anemia   - Hgb 9.6 -> 7.0 -> s/p 1 unit now 8.3 on hep gtt  - GI consulted Dr. Galicia: no EGD or c scope planned/necessary  - FOBT + but no active melena/hematochezia. No urgent need for invasive intervention per GI   - continue PPI BID  - continue to trend H/H, transfuse PRN    #APS on warfarin   #Occluded right subclavian vein stent   - holding Warfarin until H/H stable, continue hep gtt today, if H/H remains stable, start coumadin tomorrow  - INR 1.43 on arrival, subtherapeutic at 1.19 today  - hgb has remained stable w/o e/o bleeding on hep gtt  - cont hpe gtt/bridge to coumadin tonite, continue until inr therapeutic  - Arterial duplex: Occluded or almost occluded medial portion of the right subclavian vein stent -> vascular consult: No acute surgical intervention needed, unlikely stent completely occulded based on physical exam, resume a/c    #Elevated troponin  - Troponin 86 -> 281 -> 174  - TTE as above with preserved LVEF, grade 1 LV diastolic dysfunction, +regional WMAs present  - Cardiology consulted : not candidate for ischemic evaluation-> cont to tx medically, ASA, statin    #Recent MRSA endocarditis   - completed tx with Vanco via PICC on 10/18  - TTE without vegetations   - BCx 10/18 Staph hominis and Staph capitis, CoNS 2 out of 4 bottles  - BCx 10/20 negative  - ID consulted , BCx from 10/18 likely contaminant, IV abx discontinued. Monitor off abx     #ESRD on HD   - HD per renal     #Seizure d/o   #Hx CVA with dysphagia, functional quadriplegia   - continue vimpat   - Neurology consulted, unable to perform EEG due to lack of cooperation from pt  - soft and bite sized diet    #Numerous bed sores/unstageable Sacral decubitus ulcer with concern for polymicrobial infection  - CFX 2gm iv qd, D/w ID  - wound care  - culture collected: prelim proteus/Kleb  - surgery consult : recc chemical debridement , no sx debridement required at this time  - add dilaudid prn for severe pain    #Code status   - palliative care following  - pt is DNR, OK to intubate     #DVT ppx   - on hep gtt

## 2024-10-24 NOTE — PROGRESS NOTE ADULT - ASSESSMENT
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) via lefr arm picc  rt AVF   hypercalcemia   SHock r/o sepsis     REC  - no urgent indication for hd now   - no identifiable reachable next of kin/hcp will plan for 2PC for HD consent   - rt AVF with + briuit, weak thrill   - hypercalcemia work-up including spep and upep   - fu pt/inr, pt may require temp hd catheter     coumadin on hold until results available   - pressor support with levophed  - s/p CTA with A/P await results  - echo   - abg   - fu cx send   dw RENITA Bedolla and Dr White    10/19 MK   - ESRD     hd today and will attempt uf with hd     via avf   - APL no now on iv heparin    rt subclavian clot     hx of cva with aphasia   - GPC sepsis     dapto and cefepime as per ID, renally dosed     fu cx     trend of outpt cx noted from id noted in sept cleared bacteremia     TTE no vegetation     ? need to dc PICC line   dw dr white and ccu team     10/20 MK   - ESRD      likely tts while inpt, sooner if clinically indicated     via avf, fx well with hd yesterday   - APL no now on iv heparin    rt subclavian clot     hx of cva with aphasia   - melena for hold of heparin     no renal barrier for IV contrast   - GPC sepsis     dapto and cefepime as per ID, renally dosed     fu cx     trend of outpt cx noted from id noted in sept cleared bacteremia     TTE no vegetation     ? need to dc PICC line   dw dr white and ccu team     10/21 Mk   - ESRD tts, AVF   - APL no now on iv heparin    rt subclavian clot     hx of cva with aphasia   - ? melena    gi input noted, brown stool     fu trend of h/h  - GPC / staph hominis sepsis     dapto and cefepime as per ID, renally dosed     fu repeat cx 10/20    trend of outpt cx noted from id noted in sept cleared bacteremia     TTE no vegetation   dw dr baca and  ccu team     10/22 MK   - ESRD tts, AVF     tolerating hd   - hypercalcemia      correction with HD on low calcium bath      may need to start sensipar at low dose based upong trend     pth 258  - APL no now on iv heparin    rt subclavian clot     hx of cva with aphasia   - ? melena    gi input noted, brown stool     fu trend of h/h    s/p 1unit of prbc 10/21     start arjun with hd 10K  - GPC / staph hominis sepsis     dapto and cefepime as per ID, renally dosed     fu repeat cx 10/20 NGTD    trend of outpt cx noted from id noted in sept cleared bacteremia     TTE no vegetation   dw dr baca and  ccu team     10/24 SY  --ESRD : HD order and tx reviewed.  Tolerating tx well.  --MBD: high Calcium level despite low calcium baths.  --likely due to inactivity rather than severe HPT.    Trial of Cinacalcet since Calcium level is trending up.  --Anemia : continue ARJUN with HD.  --APS : Warfarin restarted.

## 2024-10-24 NOTE — PROGRESS NOTE ADULT - SUBJECTIVE AND OBJECTIVE BOX
Examined pt at bedside - awake, NAD, able to answer yes/no questions intermittently     PAIN: ( )Yes   (x )No      DYSPNEA: ( ) Yes  (x ) No          Review of Systems: Unable to obtain        PHYSICAL EXAM:    Vital Signs Last 24 Hrs  T(C): 37.6 (24 Oct 2024 07:27), Max: 37.6 (24 Oct 2024 07:27)  T(F): 99.6 (24 Oct 2024 07:27), Max: 99.6 (24 Oct 2024 07:27)  HR: 102 (24 Oct 2024 07:27) (100 - 106)  BP: 95/68 (24 Oct 2024 07:27) (95/68 - 110/60)  BP(mean): --  RR: 18 (24 Oct 2024 07:27) (18 - 18)  SpO2: 100% (24 Oct 2024 07:27) (97% - 100%)    Parameters below as of 24 Oct 2024 07:27  Patient On (Oxygen Delivery Method): room air      Daily     Daily     PPSV2:  40 %  FAST:    General: awake, NAD   Lungs: clear to auscultation   Cardiac: regular rate and rhythm   GI: soft, nontender, bs present   Ext: well perfused       LABS:                        8.6    14.78 )-----------( 140      ( 24 Oct 2024 07:22 )             27.6     10-24    138  |  102  |  30[H]  ----------------------------<  109[H]  3.7   |  28  |  5.69[H]    Ca    11.6[H]      24 Oct 2024 07:22      PT/INR - ( 24 Oct 2024 07:22 )   PT: 13.2 sec;   INR: 1.15 ratio         PTT - ( 24 Oct 2024 07:22 )  PTT:88.9 sec  Albumin: Albumin: 2.0 g/dL (10-22 @ 06:14)      Allergies    No Known Allergies    Intolerances      MEDICATIONS  (STANDING):  aspirin  chewable 81 milliGRAM(s) Oral daily  atorvastatin 40 milliGRAM(s) Oral at bedtime  busPIRone 10 milliGRAM(s) Oral two times a day  cefTRIAXone Injectable. 2000 milliGRAM(s) IV Push every 24 hours  chlorhexidine 4% Liquid 1 Application(s) Topical <User Schedule>  Dakins Solution - 1/4 Strength 1 Application(s) Topical every 12 hours  epoetin fredi-epbx (RETACRIT) Injectable 11698 Unit(s) IV Push <User Schedule>  escitalopram 20 milliGRAM(s) Oral daily  heparin  Infusion. 900 Unit(s)/Hr (9 mL/Hr) IV Continuous <Continuous>  lacosamide 150 milliGRAM(s) Oral two times a day  mupirocin 2% Nasal 1 Application(s) Both Nostrils two times a day  pantoprazole  Injectable 40 milliGRAM(s) IV Push every 12 hours  warfarin 6 milliGRAM(s) Oral daily    MEDICATIONS  (PRN):  albumin human 25% IVPB 50 milliLiter(s) IV Intermittent every 1 hour PRN syst bp < 100 on dialysis  haloperidol    Injectable 5 milliGRAM(s) IV Push every 6 hours PRN agitation  heparin   Injectable 2500 Unit(s) IV Push every 6 hours PRN For aPTT between 40 - 57  heparin   Injectable 5500 Unit(s) IV Push every 6 hours PRN For aPTT less than 40  HYDROmorphone  Injectable 0.5 milliGRAM(s) IV Push every 8 hours PRN Severe Pain (7 - 10)  LORazepam   Injectable 1 milliGRAM(s) IV Push every 6 hours PRN Agitation      RADIOLOGY:

## 2024-10-24 NOTE — PROGRESS NOTE ADULT - SUBJECTIVE AND OBJECTIVE BOX
NEPHROLOGY INTERVAL HPI/OVERNIGHT EVENTS:    Date of Service: 10-24-24 @ 14:50    10/24--Alert, no distress.  denies pain today.  able to state her name.  10/22 seen at hd , no c/o  10/21 1unit prbc, no sob, comfortable   10/20 + melena for CT with ivc   10/19 seen at HD, alert awake   HPI:  54-year-old patient with past medical history for antiphospholipid syndrome, end-stage renal disease on dialysis Monday Wednesday Friday, acute embolism and thrombosis of the right internal jugular vein, high blood pressure, stroke, endocarditis, high cholesterol, presents emergency department for hypoxia, hypotension, less responsiveness.  Patient is awake,  soft  smoking and  answers few questions.  Patient currently with no pain  .  [facility] called and informed that patient was found this morning satting 60%, blood pressure 87/55 and low temperature of 97.2.  Patient was sent to the ER for further evaluation.  Patient is a DNR, but   Not DNI  ICU called for consult  10/18: Hypotensive despite 1L IVF; add'l ordered, ESRD noted. Pressors ordered. Workup in progress.  (18 Oct 2024 11:35)  Patient is a 54y old  Female who presents with a chief complaint of Shock (18 Oct 2024 11:35)  ==============================================================================  NEPHROLOGY CONSULT  hx from chart and reivew of transfer document   esrd at [facility] on daily hd m-f   on MRSA endocard tx on vanc 500 mg end date 10/18 via left PICC line   admitted for shock with hypoxia   s/p CTA and a/p await results  on levofed   abg pending, sa02 not recordable   lethargic   has hx of APL on coumadin, with SLE on plaquenil   AVF left arm with + thrill and bruit  CCM input noted, unable to identify HCP     MEDICATIONS  (STANDING):  aspirin  chewable 81 milliGRAM(s) Oral daily  atorvastatin 40 milliGRAM(s) Oral at bedtime  busPIRone 10 milliGRAM(s) Oral two times a day  cefTRIAXone Injectable. 2000 milliGRAM(s) IV Push every 24 hours  chlorhexidine 4% Liquid 1 Application(s) Topical <User Schedule>  Dakins Solution - 1/4 Strength 1 Application(s) Topical every 12 hours  epoetin fredi-epbx (RETACRIT) Injectable 25359 Unit(s) IV Push <User Schedule>  escitalopram 20 milliGRAM(s) Oral daily  heparin  Infusion. 900 Unit(s)/Hr (9 mL/Hr) IV Continuous <Continuous>  lacosamide 150 milliGRAM(s) Oral two times a day  mupirocin 2% Nasal 1 Application(s) Both Nostrils two times a day  pantoprazole  Injectable 40 milliGRAM(s) IV Push every 12 hours  warfarin 6 milliGRAM(s) Oral daily    MEDICATIONS  (PRN):  haloperidol    Injectable 5 milliGRAM(s) IV Push every 6 hours PRN agitation  heparin   Injectable 2500 Unit(s) IV Push every 6 hours PRN For aPTT between 40 - 57  heparin   Injectable 5500 Unit(s) IV Push every 6 hours PRN For aPTT less than 40  HYDROmorphone  Injectable 0.5 milliGRAM(s) IV Push every 8 hours PRN Severe Pain (7 - 10)  LORazepam   Injectable 1 milliGRAM(s) IV Push every 6 hours PRN Agitation    Vital Signs Last 24 Hrs  T(C): 37.1 (24 Oct 2024 13:03), Max: 37.6 (24 Oct 2024 07:27)  T(F): 98.8 (24 Oct 2024 13:03), Max: 99.6 (24 Oct 2024 07:27)  HR: 92 (24 Oct 2024 14:23) (92 - 106)  BP: 78/54 (24 Oct 2024 14:23) (78/54 - 110/60)  BP(mean): --  RR: 18 (24 Oct 2024 14:23) (17 - 18)  SpO2: 96% (24 Oct 2024 12:25) (96% - 100%)    Parameters below as of 24 Oct 2024 14:04  Patient On (Oxygen Delivery Method): nasal cannula  O2 Flow (L/min): 3    10-23 @ 07:01  -  10-24 @ 07:00  --------------------------------------------------------  IN: 0 mL / OUT: 0 mL / NET: 0 mL    PHYSICAL EXAM:  GENERAL: no acute distress  CHEST/LUNG: clear to aus  HEART: S1S2 RRR  ABDOMEN: soft  EXTREMITIES: no edema  SKIN:     LABS:                        8.6    14.78 )-----------( 140      ( 24 Oct 2024 07:22 )             27.6     10-24    138  |  102  |  30[H]  ----------------------------<  109[H]  3.7   |  28  |  5.69[H]    Ca    11.6[H]      24 Oct 2024 07:22      PT/INR - ( 24 Oct 2024 07:22 )   PT: 13.2 sec;   INR: 1.15 ratio         PTT - ( 24 Oct 2024 11:17 )  PTT:90.6 sec  Urinalysis Basic - ( 24 Oct 2024 07:22 )    Color: x / Appearance: x / SG: x / pH: x  Gluc: 109 mg/dL / Ketone: x  / Bili: x / Urobili: x   Blood: x / Protein: x / Nitrite: x   Leuk Esterase: x / RBC: x / WBC x   Sq Epi: x / Non Sq Epi: x / Bacteria: x              RADIOLOGY & ADDITIONAL TESTS:

## 2024-10-24 NOTE — PROGRESS NOTE ADULT - SUBJECTIVE AND OBJECTIVE BOX
Chief Complaint: Patient is a 54y old  Female who presents with a chief complaint of Shock (21 Oct 2024 13:06)      Interval events:   - No acute events overnight, VSS, pt afebrile   - Pt downgraded out of CCU to floors   - Hgb 8.7 s/p 1 unit prbc on  10/21, guaic positive. VSS. on hep gtt for R subclavian vein stent thrombosis  - pt not able to provide any collateral, does not appear to be in any distress (appears to be her baseline)    ROS:   Patient denies any current chest pain, SOB, cough, f/c/n/v/d, abd pain, LE edema, myalgias, dysuria, HA, dizziness  All other review of systems is negative unless indicated above  ICU Vital Signs Last 24 Hrs    ICU Vital Signs Last 24 Hrs  T(C): 37.1 (24 Oct 2024 13:03), Max: 37.6 (24 Oct 2024 07:27)  T(F): 98.8 (24 Oct 2024 13:03), Max: 99.6 (24 Oct 2024 07:27)  HR: 101 (24 Oct 2024 13:03) (96 - 106)  BP: 81/51 (24 Oct 2024 13:03) (81/51 - 110/60)  BP(mean): --  ABP: --  ABP(mean): --  RR: 18 (24 Oct 2024 13:03) (18 - 18)  SpO2: 96% (24 Oct 2024 12:25) (96% - 100%)    O2 Parameters below as of 24 Oct 2024 13:03  Patient On (Oxygen Delivery Method): room air            Parameters below as of 21 Oct 2024 12:00  Patient On (Oxygen Delivery Method): room air    Constitutional: NAD, awake and alert  HEENT: PERRLA, EOMI, MMM  Respiratory: Breath sounds are clear bilaterally, No wheezing, rales or rhonchi  Cardiovascular: S1 and S2, RRR, no murmurs, gallops or rubs  Gastrointestinal: +BS, soft, non-tender, non-distended, no CVA tenderness  Extremities: No peripheral edema, +DP pulses b/l  Neurological: A&O x 1-2, no focal deficits  Musculoskeletal: 5/5 strength b/l upper and lower extremities  Skin: numerous unstageable ulcers to B/L heels sacrum and back; stage II to L ischium    Labs:  10-21 @ 08:23  Glucose 97 mg/dL  HCO3 25 mmol/L  Chloride 106 mmol/L  Sodium 141 mmol/L>   Potassium 3.6 mmol/L  Creatinine 5.75 mg/dL  Calcium 11.2 mg/dL  BUN 34 mg/dL  eGFR 8 mL/min/1.73m2  Anion gap 10 mmol/L    WBC 9.04  Hemoglobin 7.0  Hemoatocrit 22.9  Platelet count 157    PTT - ( 21 Oct 2024 08:23 )  PTT:58.4 sec    Cardiac testing:  Troponin I, High Sensitivity Result: 174.25 ng/L (10-21-24 @ 08:23)  Troponin I, High Sensitivity Result: 281.43 ng/L (10-19-24 @ 05:42)  Troponin I, High Sensitivity Result: 85.68 ng/L (10-18-24 @ 07:32)    12 Lead ECG:   Ventricular Rate 108 BPM    Atrial Rate 108 BPM    P-R Interval 162 ms    QRS Duration 84 ms    Q-T Interval 354 ms    QTC Calculation(Bazett) 474 ms    P Axis 19 degrees    R Axis 20 degrees    T Axis 127 degrees    Diagnosis Line Sinus tachycardia  Septal infarct , age undetermined  T wave abnormality, consider lateral ischemia  Abnormal ECG  No previous ECGs available  Confirmed by MD KATHIE, THERESA ([id number]) on 10/18/2024 9:15:05 PM (10-18-24 @ 08:40)    Micro:  UA 10/18 -- Trace LE, 8 WBCs, +proteinuria   BCx 10/18 x1 -- coagulase negative staph   BCx 10/20 x2 -- NGTD    Radiology:  < from: Xray Chest 1 View- PORTABLE-Urgent (10.18.24 @ 09:46) >  IMPRESSION: No acute finding.      < from: CT Head No Cont (10.18.24 @ 10:03) >  IMPRESSION:  No acute intracranial  hemorrhage or midline shift.    Right frontal temporal encephalomalacia/gliosis.    Extensive white matter small vessel ischemic disease/chronic infarct.    Chronic left thalamic infarct.    Generalized volume loss with ventricular dilatation.      < from: CT Chest No Cont (10.18.24 @ 10:03) >  IMPRESSION:  No CT evidence of pneumonia.      < from: CT Angio Chest PE Protocol w/ IV Cont (10.18.24 @ 12:40) >  IMPRESSION:  No pulmonary embolism.    Focal nonopacification of a portion of the right subclavian artery stent,   suspicious for thrombus. Correlate with Doppler imaging.    Enlarged myomatous uterus, with large pedunculated 14cm fibroid extending   up to the left hemiabdomen.  Recommend GYN evaluation.      < from: US Duplex Arterial Upper Ext Ltd, Right (10.18.24 @ 16:04) >  IMPRESSION:  Occluded or almost occluded medial portion of the right subclavian vein   stent.    < from: TTE W or WO Ultrasound Enhancing Agent (10.18.24 @ 13:20) >   1. Left ventricular cavity is small. Left ventricular systolic function is normal with an ejection fraction of 51 % by 3D with an ejection fraction visually estimated at 50 to 55 %. Regional wall motion abnormalities present.   2. Mid and apical anterior septum and mid and apical inferior septum are abnormal.   3. There is mild (grade 1) left ventricular diastolic dysfunction.   4. Normal right ventricular cavity size, with normal wall thickness, and normal right ventricular systolic function.   5. Left atrium is mildly dilated.   6. Mild mitral regurgitation.   7. Mild tricuspid regurgitation.   8. Estimated pulmonary artery systolic pressure is 35 mmHg, consistent with borderline pulmonary hypertension.   9. Mild mitral valve stenosis.  10. There is mild calcification of the mitral valve annulus.  11. Trileaflet aortic valve with normal systolic excursion. There is minimal thickening of the aortic valve leaflets.    < end of copied text >      Medications:  MEDICATIONS  (STANDING):  busPIRone 10 milliGRAM(s) Oral two times a day  chlorhexidine 4% Liquid 1 Application(s) Topical <User Schedule>  collagenase Ointment 1 Application(s) Topical daily  escitalopram 20 milliGRAM(s) Oral daily  heparin  Infusion. 900 Unit(s)/Hr (9 mL/Hr) IV Continuous <Continuous>  lacosamide 150 milliGRAM(s) Oral two times a day  pantoprazole  Injectable 40 milliGRAM(s) IV Push every 12 hours    MEDICATIONS  (PRN):  haloperidol    Injectable 5 milliGRAM(s) IV Push every 6 hours PRN agitation  heparin   Injectable 2500 Unit(s) IV Push every 6 hours PRN For aPTT between 40 - 57  heparin   Injectable 5500 Unit(s) IV Push every 6 hours PRN For aPTT less than 40  LORazepam   Injectable 1 milliGRAM(s) IV Push every 6 hours PRN Agitation

## 2024-10-25 LAB
APTT BLD: 74.8 SEC — HIGH (ref 24.5–35.6)
CULTURE RESULTS: SIGNIFICANT CHANGE UP
CULTURE RESULTS: SIGNIFICANT CHANGE UP
HCT VFR BLD CALC: 27.9 % — LOW (ref 34.5–45)
HGB BLD-MCNC: 8.5 G/DL — LOW (ref 11.5–15.5)
INR BLD: 1.16 RATIO — SIGNIFICANT CHANGE UP (ref 0.85–1.16)
MCHC RBC-ENTMCNC: 30.5 GM/DL — LOW (ref 32–36)
MCHC RBC-ENTMCNC: 30.8 PG — SIGNIFICANT CHANGE UP (ref 27–34)
MCV RBC AUTO: 101.1 FL — HIGH (ref 80–100)
PLATELET # BLD AUTO: 147 K/UL — LOW (ref 150–400)
PROTHROM AB SERPL-ACNC: 13.7 SEC — HIGH (ref 9.9–13.4)
RBC # BLD: 2.76 M/UL — LOW (ref 3.8–5.2)
RBC # FLD: 21.6 % — HIGH (ref 10.3–14.5)
SPECIMEN SOURCE: SIGNIFICANT CHANGE UP
SPECIMEN SOURCE: SIGNIFICANT CHANGE UP
WBC # BLD: 14.04 K/UL — HIGH (ref 3.8–10.5)
WBC # FLD AUTO: 14.04 K/UL — HIGH (ref 3.8–10.5)

## 2024-10-25 PROCEDURE — 99233 SBSQ HOSP IP/OBS HIGH 50: CPT | Mod: FS

## 2024-10-25 RX ORDER — WARFARIN SODIUM 4 MG
7.5 TABLET ORAL ONCE
Refills: 0 | Status: COMPLETED | OUTPATIENT
Start: 2024-10-25 | End: 2024-10-25

## 2024-10-25 RX ORDER — TRAZODONE HYDROCHLORIDE 100 MG/1
100 TABLET ORAL AT BEDTIME
Refills: 0 | Status: DISCONTINUED | OUTPATIENT
Start: 2024-10-25 | End: 2024-10-29

## 2024-10-25 RX ORDER — ACETAMINOPHEN 500 MG
1000 TABLET ORAL ONCE
Refills: 0 | Status: COMPLETED | OUTPATIENT
Start: 2024-10-25 | End: 2024-10-25

## 2024-10-25 RX ORDER — METOPROLOL TARTRATE 50 MG
25 TABLET ORAL DAILY
Refills: 0 | Status: DISCONTINUED | OUTPATIENT
Start: 2024-10-25 | End: 2024-10-29

## 2024-10-25 RX ORDER — POLYETHYLENE GLYCOL 3350 17 G/17G
17 POWDER, FOR SOLUTION ORAL DAILY
Refills: 0 | Status: DISCONTINUED | OUTPATIENT
Start: 2024-10-25 | End: 2024-10-29

## 2024-10-25 RX ADMIN — CINACALCET 30 MILLIGRAM(S): 30 TABLET, FILM COATED ORAL at 11:02

## 2024-10-25 RX ADMIN — HEPARIN SODIUM 1100 UNIT(S)/HR: 10000 INJECTION INTRAVENOUS; SUBCUTANEOUS at 07:29

## 2024-10-25 RX ADMIN — MUPIROCIN 1 APPLICATION(S): 20 OINTMENT TOPICAL at 21:55

## 2024-10-25 RX ADMIN — MUPIROCIN 1 APPLICATION(S): 20 OINTMENT TOPICAL at 11:04

## 2024-10-25 RX ADMIN — Medication 81 MILLIGRAM(S): at 11:04

## 2024-10-25 RX ADMIN — BUSPIRONE HYDROCHLORIDE 10 MILLIGRAM(S): 15 TABLET ORAL at 21:54

## 2024-10-25 RX ADMIN — Medication 25 MILLIGRAM(S): at 13:30

## 2024-10-25 RX ADMIN — HEPARIN SODIUM 1100 UNIT(S)/HR: 10000 INJECTION INTRAVENOUS; SUBCUTANEOUS at 19:25

## 2024-10-25 RX ADMIN — Medication 40 MILLIGRAM(S): at 21:55

## 2024-10-25 RX ADMIN — HEPARIN SODIUM 1100 UNIT(S)/HR: 10000 INJECTION INTRAVENOUS; SUBCUTANEOUS at 01:28

## 2024-10-25 RX ADMIN — ESCITALOPRAM 20 MILLIGRAM(S): 10 TABLET, FILM COATED ORAL at 11:03

## 2024-10-25 RX ADMIN — PANTOPRAZOLE SODIUM 40 MILLIGRAM(S): 40 TABLET, DELAYED RELEASE ORAL at 11:03

## 2024-10-25 RX ADMIN — Medication 1000 MILLIGRAM(S): at 13:28

## 2024-10-25 RX ADMIN — Medication 400 MILLIGRAM(S): at 12:57

## 2024-10-25 RX ADMIN — CHLORHEXIDINE GLUCONATE 1 APPLICATION(S): 40 SOLUTION TOPICAL at 08:00

## 2024-10-25 RX ADMIN — BUSPIRONE HYDROCHLORIDE 10 MILLIGRAM(S): 15 TABLET ORAL at 11:03

## 2024-10-25 RX ADMIN — HEPARIN SODIUM 1100 UNIT(S)/HR: 10000 INJECTION INTRAVENOUS; SUBCUTANEOUS at 08:49

## 2024-10-25 RX ADMIN — LACOSAMIDE 150 MILLIGRAM(S): 100 TABLET ORAL at 11:03

## 2024-10-25 RX ADMIN — Medication 1 APPLICATION(S): at 18:28

## 2024-10-25 RX ADMIN — TRAZODONE HYDROCHLORIDE 100 MILLIGRAM(S): 100 TABLET ORAL at 21:55

## 2024-10-25 RX ADMIN — Medication 7.5 MILLIGRAM(S): at 21:55

## 2024-10-25 RX ADMIN — LACOSAMIDE 150 MILLIGRAM(S): 100 TABLET ORAL at 21:54

## 2024-10-25 RX ADMIN — PANTOPRAZOLE SODIUM 40 MILLIGRAM(S): 40 TABLET, DELAYED RELEASE ORAL at 21:54

## 2024-10-25 RX ADMIN — MEROPENEM 500 MILLIGRAM(S): 1 INJECTION INTRAVENOUS at 18:27

## 2024-10-25 NOTE — PROGRESS NOTE ADULT - SUBJECTIVE AND OBJECTIVE BOX
Date of Service:10-25-24 @ 12:05  Interval History/ROS: Afebrile overnight, Limited ROS due to mental status, Wound culture with ESBL Kleb and Proteus, WBC stable      REVIEW OF SYSTEMS  [  x] ROS unobtainable because:  mental status  [  ] All other systems negative except as noted below    Constitutional:  [ ] fever [ ] chills  [ ] weight loss  [ ]night sweat  [ ]poor appetite/PO intake [ ]fatigue   Skin:  [ ] rash [ ] phlebitis	  Eyes: [ ] icterus [ ] pain  [ ] discharge	  ENMT: [ ] sore throat  [ ] thrush [ ] ulcers [ ] exudates [ ]anosmia  Respiratory: [ ] dyspnea [ ] hemoptysis [ ] cough [ ] sputum	  Cardiovascular:  [ ] chest pain [ ] palpitations [ ] edema	  Gastrointestinal:  [ ] nausea [ ] vomiting [ ] diarrhea [ ] constipation [ ] pain	  Genitourinary:  [ ] dysuria [ ] frequency [ ] hematuria [ ] discharge [ ] flank pain  [ ] incontinence  Musculoskeletal:  [ ] myalgias [ ] arthralgias [ ] arthritis  [ ] back pain  Neurological:  [ ] headache [ ] weakness [ ] seizures  [ ] confusion/altered mental status    Allergies  No Known Allergies        ANTIMICROBIALS:    meropenem Injectable 500 every 24 hours        OTHER MEDS: MEDICATIONS  (STANDING):  acetaminophen   IVPB .. 1000 once  aspirin  chewable 81 daily  atorvastatin 40 at bedtime  busPIRone 10 two times a day  cinacalcet 30 daily  epoetin fredi-epbx (RETACRIT) Injectable 99984 <User Schedule>  escitalopram 20 daily  haloperidol    Injectable 5 every 6 hours PRN  heparin   Injectable 2500 every 6 hours PRN  heparin   Injectable 5500 every 6 hours PRN  heparin  Infusion. 900 <Continuous>  HYDROmorphone  Injectable 0.5 every 8 hours PRN  lacosamide 150 two times a day  LORazepam   Injectable 1 every 6 hours PRN  pantoprazole    Tablet 40 every 12 hours  warfarin 7.5 once      Vital Signs Last 24 Hrs  T(F): 99.5 (10-25-24 @ 10:54), Max: 100 (10-25-24 @ 06:00)    Vital Signs Last 24 Hrs  HR: 90 (10-25-24 @ 10:54) (85 - 104)  BP: 90/77 (10-25-24 @ 10:54) (75/51 - 102/75)  RR: 18 (10-25-24 @ 10:54)  SpO2: 100% (10-25-24 @ 10:54) (94% - 100%)  Wt(kg): --    EXAM:    Constitutional: NAD  Head/Eyes: no icterus  LUNGS:  crackles  CVS:  regular rhythm  Abd:  soft, non-tender; non-distended  Ext:  no edema  Vascular:  IV site no erythema tenderness or discharge  Sacrum: decub ulcer with periwound erythema, improved, no drainage  Neuro: AAO X 1    Labs:                        8.5    14.04 )-----------( 147      ( 25 Oct 2024 08:00 )             27.9     10-24    138  |  102  |  30[H]  ----------------------------<  109[H]  3.7   |  28  |  5.69[H]    Ca    11.6[H]      24 Oct 2024 07:22        WBC Trend:  WBC Count: 14.04 (10-25-24 @ 08:00)  WBC Count: 14.78 (10-24-24 @ 07:22)  WBC Count: 10.68 (10-23-24 @ 07:43)  WBC Count: 10.03 (10-22-24 @ 21:11)      Creatine Trend:  Creatinine: 5.69 (10-24)  Creatinine: 3.92 (10-23)  Creatinine: 7.14 (10-22)  Creatinine: 5.75 (10-21)      Liver Biochemical Testing Trend:  Alanine Aminotransferase (ALT/SGPT): 27 (10-21)  Alanine Aminotransferase (ALT/SGPT): 35 (10-20)  Alanine Aminotransferase (ALT/SGPT): 42 (10-18)  Aspartate Aminotransferase (AST/SGOT): 29 (10-21-24 @ 08:23)  Aspartate Aminotransferase (AST/SGOT): 32 (10-20-24 @ 06:47)  Aspartate Aminotransferase (AST/SGOT): 57 (10-18-24 @ 07:32)  Bilirubin Total: 0.6 (10-21)  Bilirubin Total: 0.8 (10-20)  Bilirubin Total: 0.6 (10-18)      Trend LDH      Urinalysis Basic - ( 24 Oct 2024 07:22 )    Color: x / Appearance: x / SG: x / pH: x  Gluc: 109 mg/dL / Ketone: x  / Bili: x / Urobili: x   Blood: x / Protein: x / Nitrite: x   Leuk Esterase: x / RBC: x / WBC x   Sq Epi: x / Non Sq Epi: x / Bacteria: x        MICROBIOLOGY:  Vancomycin Level, Trough: 24.1 (10-20 @ 06:47)    MRSA PCR Result.: Detected (10-21-24 @ 13:32)      Culture - Blood (collected 20 Oct 2024 06:47)  Source: .Blood BLOOD  Preliminary Report:    No growth at 4 days    Culture - Blood (collected 20 Oct 2024 06:47)  Source: .Blood BLOOD  Preliminary Report:    No growth at 4 days    Culture - Blood (collected 18 Oct 2024 09:01)  Source: .Blood BLOOD  Final Report:    Growth in aerobic bottle: Staphylococcus hominis    Growth in anaerobic bottle: Staphylococcus capitis    Isolation of Coagulase negative Staphylococcus from single blood culture    sets may represent    contamination. Contact the Microbiology Department at [phone] if    susceptibility testing is needed.    clinically indicated.    Direct identification is available within approximately 3-5    hours either by Blood Panel Multiplexed PCR or Direct    MALDI-TOF. Details: [email or web address]  Organism: Blood Culture PCR  Organism: Blood Culture PCR    Sensitivities:      Method Type: PCR      -  Coagulase negative Staphylococcus: Detec    Culture - Blood (collected 18 Oct 2024 07:32)  Source: .Blood BLOOD  Final Report:    No growth at 5 days    Urinalysis with Rflx Culture (collected 18 Oct 2024 07:30)    RADIOLOGY:  imaging below personally reviewed

## 2024-10-25 NOTE — PROGRESS NOTE ADULT - ASSESSMENT
55 y/o female PMH APS on warfarin, ESRD on HD, ?lupus on Plaquenil, CVA, right internal jugular vein thrombus, functional quadriplegia, seizure d/o on Vimpat, recent hospitalization at [facility] for MRSA endocarditis (finished vanc on 10/18), presented from [facility] for hypotension, hypoxia and poor responsiveness/lethargy   Remained hypotensive in ED despite 2.5L IVF, started on pressors and admitted to CCU     #Hypotension/shock multifactorial - hypovolemic/medication   #Acute toxic encephalopathy likely due to meds/polypharmacy  #Acute hypoxic respiratory failure - resolved  - s/p CCU care requiring pressors for BP support, now off  - CTH negative for CVA/hemorrhage, notes Right frontal temporal encephalomalacia/gliosis, Extensive white matter small vessel ischemic disease/chronic left thalamic infarct.  - CTA chest negative for PE or PNA  - s/p supplemental O2, monitor pulse ox  - continue Haldol/Ativan PRN for agitation   - home BB + nifedipine held given shock, resume as BP allows, bp still slightly too low to resume. Will hold for now  - PT evaluation      #Suspected GI bleed   #Acute blood loss anemia   - Hgb 9.6 -> 7.0 -> s/p 1 unit now stable in the 8s. Continue hep gtt as bridge  - GI consulted Dr. Galicia: no EGD or c scope planned/necessary  - FOBT + but no active melena/hematochezia. No urgent need for invasive intervention per GI   - continue PPI BID  - continue to trend H/H, transfuse PRN    #APS on warfarin   #Occluded right subclavian vein stent   - holding Warfarin until H/H stable, continue hep gtt today, if H/H remains stable, start coumadin tomorrow  - INR subTx  - hgb has remained stable w/o e/o bleeding on hep gtt  - cont hep gtt/bridge to coumadin, continue until inr therapeutic  - Arterial duplex: Occluded or almost occluded medial portion of the right subclavian vein stent -> vascular consult: No acute surgical intervention needed, unlikely stent completely occulded based on physical exam, resume a/c    #Elevated troponin  - Troponin 86 -> 281 -> 174  - TTE as above with preserved LVEF, grade 1 LV diastolic dysfunction, +regional WMAs present  - Cardiology consulted : not candidate for ischemic evaluation-> cont to tx medically, ASA, statin    #Recent MRSA endocarditis   - completed tx with Vanco via PICC on 10/18  - TTE without vegetations   - BCx 10/18 Staph hominis and Staph capitis, CoNS 2 out of 4 bottles  - BCx 10/20 negative  - ID consulted , BCx from 10/18 likely contaminant, IV abx discontinued. Monitor off abx     #ESRD on HD   - HD per renal     #Seizure d/o   #Hx CVA with dysphagia, functional quadriplegia   - continue vimpat   - Neurology consulted, unable to perform EEG due to lack of cooperation from pt  - soft and bite sized diet    #Numerous bed sores/unstageable Sacral decubitus ulcer with concern for polymicrobial infection  - CFX 2gm iv qd, D/w ID  - wound cx: ESBL Kleb/proteus/bacteroides-> start meropenem  - surgery consult : recc chemical debridement , no sx debridement required at this time  - ofirmev for pain  - stop dilaudid    #Code status   - palliative care following  - pt is DNR, OK to intubate     #DVT ppx   - on hep gtt

## 2024-10-25 NOTE — PROGRESS NOTE ADULT - SUBJECTIVE AND OBJECTIVE BOX
Chief Complaint: Patient is a 54y old  Female who presents with a chief complaint of Shock (21 Oct 2024 13:06)      Interval events:   - No acute events overnight, VSS, pt afebrile   - Pt downgraded out of CCU to floors   - Hgb 8.7 s/p 1 unit prbc on  10/21, guaic positive. VSS. on hep gtt for R subclavian vein stent thrombosis  - pt not able to provide any collateral, does not appear to be in any distress (appears to be her baseline)      10/25: wound cx growing polyorganisms/ESBL.     ROS:   Patient denies any current chest pain, SOB, cough, f/c/n/v/d, abd pain, LE edema, myalgias, dysuria, HA, dizziness  All other review of systems is negative unless indicated above  ICU Vital Signs Last 24 Hrs    ICU Vital Signs Last 24 Hrs  T(C): 37.5 (25 Oct 2024 10:54), Max: 37.8 (25 Oct 2024 06:00)  T(F): 99.5 (25 Oct 2024 10:54), Max: 100 (25 Oct 2024 06:00)  HR: 90 (25 Oct 2024 10:54) (85 - 104)  BP: 90/77 (25 Oct 2024 10:54) (75/51 - 102/75)  BP(mean): 80 (25 Oct 2024 06:00) (80 - 80)  ABP: --  ABP(mean): --  RR: 18 (25 Oct 2024 10:54) (16 - 18)  SpO2: 100% (25 Oct 2024 10:54) (94% - 100%)    O2 Parameters below as of 25 Oct 2024 10:54  Patient On (Oxygen Delivery Method): room air          O2 Parameters below as of 24 Oct 2024 13:03  Patient On (Oxygen Delivery Method): room air            Parameters below as of 21 Oct 2024 12:00  Patient On (Oxygen Delivery Method): room air    Constitutional: NAD, awake and alert  HEENT: PERRLA, EOMI, MMM  Respiratory: Breath sounds are clear bilaterally, No wheezing, rales or rhonchi  Cardiovascular: S1 and S2, RRR, no murmurs, gallops or rubs  Gastrointestinal: +BS, soft, non-tender, non-distended, no CVA tenderness  Extremities: No peripheral edema, +DP pulses b/l  Neurological: A&O x 1-2, no focal deficits  Musculoskeletal: 5/5 strength b/l upper and lower extremities  Skin: numerous unstageable ulcers to B/L heels sacrum and back; stage II to L ischium    Labs:  10-21 @ 08:23  Glucose 97 mg/dL  HCO3 25 mmol/L  Chloride 106 mmol/L  Sodium 141 mmol/L>   Potassium 3.6 mmol/L  Creatinine 5.75 mg/dL  Calcium 11.2 mg/dL  BUN 34 mg/dL  eGFR 8 mL/min/1.73m2  Anion gap 10 mmol/L    WBC 9.04  Hemoglobin 7.0  Hemoatocrit 22.9  Platelet count 157    PTT - ( 21 Oct 2024 08:23 )  PTT:58.4 sec    Cardiac testing:  Troponin I, High Sensitivity Result: 174.25 ng/L (10-21-24 @ 08:23)  Troponin I, High Sensitivity Result: 281.43 ng/L (10-19-24 @ 05:42)  Troponin I, High Sensitivity Result: 85.68 ng/L (10-18-24 @ 07:32)    12 Lead ECG:   Ventricular Rate 108 BPM    Atrial Rate 108 BPM    P-R Interval 162 ms    QRS Duration 84 ms    Q-T Interval 354 ms    QTC Calculation(Bazett) 474 ms    P Axis 19 degrees    R Axis 20 degrees    T Axis 127 degrees    Diagnosis Line Sinus tachycardia  Septal infarct , age undetermined  T wave abnormality, consider lateral ischemia  Abnormal ECG  No previous ECGs available  Confirmed by MD KATHIE, THERESA ([id number]) on 10/18/2024 9:15:05 PM (10-18-24 @ 08:40)    Micro:  UA 10/18 -- Trace LE, 8 WBCs, +proteinuria   BCx 10/18 x1 -- coagulase negative staph   BCx 10/20 x2 -- NGTD    Radiology:  < from: Xray Chest 1 View- PORTABLE-Urgent (10.18.24 @ 09:46) >  IMPRESSION: No acute finding.      < from: CT Head No Cont (10.18.24 @ 10:03) >  IMPRESSION:  No acute intracranial  hemorrhage or midline shift.    Right frontal temporal encephalomalacia/gliosis.    Extensive white matter small vessel ischemic disease/chronic infarct.    Chronic left thalamic infarct.    Generalized volume loss with ventricular dilatation.      < from: CT Chest No Cont (10.18.24 @ 10:03) >  IMPRESSION:  No CT evidence of pneumonia.      < from: CT Angio Chest PE Protocol w/ IV Cont (10.18.24 @ 12:40) >  IMPRESSION:  No pulmonary embolism.    Focal nonopacification of a portion of the right subclavian artery stent,   suspicious for thrombus. Correlate with Doppler imaging.    Enlarged myomatous uterus, with large pedunculated 14cm fibroid extending   up to the left hemiabdomen.  Recommend GYN evaluation.      < from: US Duplex Arterial Upper Ext Ltd, Right (10.18.24 @ 16:04) >  IMPRESSION:  Occluded or almost occluded medial portion of the right subclavian vein   stent.    < from: TTE W or WO Ultrasound Enhancing Agent (10.18.24 @ 13:20) >   1. Left ventricular cavity is small. Left ventricular systolic function is normal with an ejection fraction of 51 % by 3D with an ejection fraction visually estimated at 50 to 55 %. Regional wall motion abnormalities present.   2. Mid and apical anterior septum and mid and apical inferior septum are abnormal.   3. There is mild (grade 1) left ventricular diastolic dysfunction.   4. Normal right ventricular cavity size, with normal wall thickness, and normal right ventricular systolic function.   5. Left atrium is mildly dilated.   6. Mild mitral regurgitation.   7. Mild tricuspid regurgitation.   8. Estimated pulmonary artery systolic pressure is 35 mmHg, consistent with borderline pulmonary hypertension.   9. Mild mitral valve stenosis.  10. There is mild calcification of the mitral valve annulus.  11. Trileaflet aortic valve with normal systolic excursion. There is minimal thickening of the aortic valve leaflets.    < end of copied text >      Medications:  MEDICATIONS  (STANDING):  busPIRone 10 milliGRAM(s) Oral two times a day  chlorhexidine 4% Liquid 1 Application(s) Topical <User Schedule>  collagenase Ointment 1 Application(s) Topical daily  escitalopram 20 milliGRAM(s) Oral daily  heparin  Infusion. 900 Unit(s)/Hr (9 mL/Hr) IV Continuous <Continuous>  lacosamide 150 milliGRAM(s) Oral two times a day  pantoprazole  Injectable 40 milliGRAM(s) IV Push every 12 hours    MEDICATIONS  (PRN):  haloperidol    Injectable 5 milliGRAM(s) IV Push every 6 hours PRN agitation  heparin   Injectable 2500 Unit(s) IV Push every 6 hours PRN For aPTT between 40 - 57  heparin   Injectable 5500 Unit(s) IV Push every 6 hours PRN For aPTT less than 40  LORazepam   Injectable 1 milliGRAM(s) IV Push every 6 hours PRN Agitation

## 2024-10-25 NOTE — PROGRESS NOTE ADULT - ASSESSMENT
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), ESRD on HD M/W/F, R IJ VTE, CVA, HTN, recent discharge [facility] for MRSA Endocarditis on Vancomycin via PICC line (last day 10/18) presents from [facility] 10/18 for hypoxia, hypotension and lethargy  Afebrile on admission, on NRB > 4L NC, Hypotensive on pressors  WBC 12  Cr 6  UA negative, RVP negative  CTA without PE, R subclavian artery stent thrombosis  CTAP grossly negative for infectious etiology  TTE without obvious vegetation   BCx 10/18 Staph hominis and Staph capitis, CoNS 2 out of 4 bottles  BCx 10/20 negative  Wound culture 10/22 with ESBL Kleb, ESBL Proteus R Flouroquinolone and Bactrim, Bacteriodes     Prior Culture:  BCx 9/2 MRSA 2 out of 4  BCx 9/4 negative  BCx 9/24 negative    Antimicrobials:  Vancomycin 1000mg x1 (10/18) Dapto (10/19 --- 10/21)  cefepime  Injectable. 1000 every 24 hours (10/18 --- 10/21)  CTX 2G q24 (10/24 --- ) Meropenem 500mg q24 (10/24 --- )    Impression:   #Sacral Ulcer, Soft Skin Tissue Infection, Polymicrobial Infection, ESBL  #Acute Hypoxic Respiratory Failure, resolved  #Hypotension, Lethargy  #Recent MRSA Endocarditis, completed treatment   #ESRD on HD  - completed vancomycin for endocarditis 10/18, even received additional cefepime and dapto till 10/21  - Wound culture with ESBL Kleb and Proteus, Bacteriodes    Recommendations:  - continue Meropenem 500mg q24 (renally dosed)  - monitor temperature curve  - trend WBC  - side effects of antibiotic discussed, tolerating abx well so far  - Wound care appreciated; continue excellent wound care  - Surgery appreciated; no surgical intervention  - plan for IV Ertapenem 500mg q24 via midline for 10-day course (enddate: 11/3/2024)    Clinical team may change from intravenous to oral antibiotics when the following criteria are met:   1. Patient is clinically improving/stable       a)	Improved signs and symptoms of infection from initial presentation       b)	Afebrile for 24 hours       c)	Leukocytosis trending towards normal range   2. Patient is tolerating oral intake   3. Initial/repeat blood cultures are negative OR do not need to wait for preliminary blood cultures to result    IV therapy 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), ESRD on HD M/W/F, R IJ VTE, CVA, HTN, recent discharge [facility] for MRSA Endocarditis on Vancomycin via PICC line (last day 10/18) presents from [facility] 10/18 for hypoxia, hypotension and lethargy  Afebrile on admission, on NRB > 4L NC, Hypotensive on pressors  WBC 12  Cr 6  UA negative, RVP negative  CTA without PE, R subclavian artery stent thrombosis  CTAP grossly negative for infectious etiology  TTE without obvious vegetation   BCx 10/18 Staph hominis and Staph capitis, CoNS 2 out of 4 bottles  BCx 10/20 negative  Wound culture 10/22 with ESBL Kleb, ESBL Proteus R Flouroquinolone and Bactrim, Bacteriodes     Prior Culture:  BCx 9/2 MRSA 2 out of 4  BCx 9/4 negative  BCx 9/24 negative    Antimicrobials:  Vancomycin 1000mg x1 (10/18) Dapto (10/19 --- 10/21)  cefepime  Injectable. 1000 every 24 hours (10/18 --- 10/21)  CTX 2G q24 (10/24 --- ) Meropenem 500mg q24 (10/24 --- )    Impression:   #Sacral Ulcer, Soft Skin Tissue Infection, Polymicrobial Infection, ESBL  #Acute Hypoxic Respiratory Failure, resolved  #Hypotension, Lethargy  #Recent MRSA Endocarditis, completed treatment   #ESRD on HD  - completed vancomycin for endocarditis 10/18, even received additional cefepime and dapto till 10/21  - Wound culture with ESBL Kleb and Proteus, Bacteriodes  - leukocytosis stable, likely all reactive, remains afebrile    Recommendations:  - continue Meropenem 500mg q24 (renally dosed)  - monitor temperature curve  - trend WBC  - side effects of antibiotic discussed, tolerating abx well so far  - Wound care appreciated; continue excellent wound care  - Surgery appreciated; no surgical intervention  - plan for IV Ertapenem 500mg q24 via midline for 10-day course (enddate: 11/3/2024)  - ID service will sign off. Please reconsult/call as needed    Clinical team may change from intravenous to oral antibiotics when the following criteria are met:   1. Patient is clinically improving/stable       a)	Improved signs and symptoms of infection from initial presentation       b)	Afebrile for 24 hours       c)	Leukocytosis trending towards normal range   2. Patient is tolerating oral intake   3. Initial/repeat blood cultures are negative OR do not need to wait for preliminary blood cultures to result    IV therapy

## 2024-10-26 LAB
ADD ON TEST-SPECIMEN IN LAB: SIGNIFICANT CHANGE UP
ANION GAP SERPL CALC-SCNC: 5 MMOL/L — SIGNIFICANT CHANGE UP (ref 5–17)
APTT BLD: 100.8 SEC — HIGH (ref 24.5–35.6)
APTT BLD: 103.1 SEC — HIGH (ref 24.5–35.6)
APTT BLD: 63 SEC — HIGH (ref 24.5–35.6)
BUN SERPL-MCNC: 20 MG/DL — SIGNIFICANT CHANGE UP (ref 7–23)
CALCIUM SERPL-MCNC: 11.2 MG/DL — HIGH (ref 8.5–10.1)
CHLORIDE SERPL-SCNC: 100 MMOL/L — SIGNIFICANT CHANGE UP (ref 96–108)
CO2 SERPL-SCNC: 30 MMOL/L — SIGNIFICANT CHANGE UP (ref 22–31)
CREAT SERPL-MCNC: 5.14 MG/DL — HIGH (ref 0.5–1.3)
EGFR: 9 ML/MIN/1.73M2 — LOW
GLUCOSE SERPL-MCNC: 95 MG/DL — SIGNIFICANT CHANGE UP (ref 70–99)
HCT VFR BLD CALC: 29.5 % — LOW (ref 34.5–45)
HGB BLD-MCNC: 9.1 G/DL — LOW (ref 11.5–15.5)
INR BLD: 1.31 RATIO — HIGH (ref 0.85–1.16)
MCHC RBC-ENTMCNC: 30.8 GM/DL — LOW (ref 32–36)
MCHC RBC-ENTMCNC: 31.3 PG — SIGNIFICANT CHANGE UP (ref 27–34)
MCV RBC AUTO: 101.4 FL — HIGH (ref 80–100)
PHOSPHATE SERPL-MCNC: 3.2 MG/DL — SIGNIFICANT CHANGE UP (ref 2.5–4.5)
PLATELET # BLD AUTO: 183 K/UL — SIGNIFICANT CHANGE UP (ref 150–400)
POTASSIUM SERPL-MCNC: 3.6 MMOL/L — SIGNIFICANT CHANGE UP (ref 3.5–5.3)
POTASSIUM SERPL-SCNC: 3.6 MMOL/L — SIGNIFICANT CHANGE UP (ref 3.5–5.3)
PROTHROM AB SERPL-ACNC: 15 SEC — HIGH (ref 9.9–13.4)
RBC # BLD: 2.91 M/UL — LOW (ref 3.8–5.2)
RBC # FLD: 21.2 % — HIGH (ref 10.3–14.5)
SODIUM SERPL-SCNC: 135 MMOL/L — SIGNIFICANT CHANGE UP (ref 135–145)
WBC # BLD: 13.27 K/UL — HIGH (ref 3.8–10.5)
WBC # FLD AUTO: 13.27 K/UL — HIGH (ref 3.8–10.5)

## 2024-10-26 PROCEDURE — 99233 SBSQ HOSP IP/OBS HIGH 50: CPT

## 2024-10-26 RX ORDER — WARFARIN SODIUM 4 MG
6 TABLET ORAL ONCE
Refills: 0 | Status: COMPLETED | OUTPATIENT
Start: 2024-10-26 | End: 2024-10-26

## 2024-10-26 RX ORDER — ALBUMIN HUMAN 50 G/1000ML
50 SOLUTION INTRAVENOUS
Refills: 0 | Status: DISCONTINUED | OUTPATIENT
Start: 2024-10-26 | End: 2024-10-29

## 2024-10-26 RX ADMIN — MUPIROCIN 1 APPLICATION(S): 20 OINTMENT TOPICAL at 10:24

## 2024-10-26 RX ADMIN — POLYETHYLENE GLYCOL 3350 17 GRAM(S): 17 POWDER, FOR SOLUTION ORAL at 10:24

## 2024-10-26 RX ADMIN — MEROPENEM 500 MILLIGRAM(S): 1 INJECTION INTRAVENOUS at 18:30

## 2024-10-26 RX ADMIN — HEPARIN SODIUM 1000 UNIT(S)/HR: 10000 INJECTION INTRAVENOUS; SUBCUTANEOUS at 07:18

## 2024-10-26 RX ADMIN — Medication 40 MILLIGRAM(S): at 22:09

## 2024-10-26 RX ADMIN — BUSPIRONE HYDROCHLORIDE 10 MILLIGRAM(S): 15 TABLET ORAL at 22:09

## 2024-10-26 RX ADMIN — HEPARIN SODIUM 900 UNIT(S)/HR: 10000 INJECTION INTRAVENOUS; SUBCUTANEOUS at 14:44

## 2024-10-26 RX ADMIN — PANTOPRAZOLE SODIUM 40 MILLIGRAM(S): 40 TABLET, DELAYED RELEASE ORAL at 10:25

## 2024-10-26 RX ADMIN — LACOSAMIDE 150 MILLIGRAM(S): 100 TABLET ORAL at 22:09

## 2024-10-26 RX ADMIN — Medication 1 APPLICATION(S): at 05:07

## 2024-10-26 RX ADMIN — Medication 25 MILLIGRAM(S): at 10:24

## 2024-10-26 RX ADMIN — TRAZODONE HYDROCHLORIDE 100 MILLIGRAM(S): 100 TABLET ORAL at 22:09

## 2024-10-26 RX ADMIN — PANTOPRAZOLE SODIUM 40 MILLIGRAM(S): 40 TABLET, DELAYED RELEASE ORAL at 22:09

## 2024-10-26 RX ADMIN — ALBUMIN HUMAN 1000 MILLILITER(S): 50 SOLUTION INTRAVENOUS at 15:53

## 2024-10-26 RX ADMIN — LACOSAMIDE 150 MILLIGRAM(S): 100 TABLET ORAL at 10:25

## 2024-10-26 RX ADMIN — HEPARIN SODIUM 900 UNIT(S)/HR: 10000 INJECTION INTRAVENOUS; SUBCUTANEOUS at 21:39

## 2024-10-26 RX ADMIN — Medication 81 MILLIGRAM(S): at 10:25

## 2024-10-26 RX ADMIN — MUPIROCIN 1 APPLICATION(S): 20 OINTMENT TOPICAL at 22:09

## 2024-10-26 RX ADMIN — ERYTHROPOIETIN 10000 UNIT(S): 10000 INJECTION, SOLUTION INTRAVENOUS; SUBCUTANEOUS at 15:16

## 2024-10-26 RX ADMIN — ESCITALOPRAM 20 MILLIGRAM(S): 10 TABLET, FILM COATED ORAL at 10:25

## 2024-10-26 RX ADMIN — CINACALCET 30 MILLIGRAM(S): 30 TABLET, FILM COATED ORAL at 10:24

## 2024-10-26 RX ADMIN — HEPARIN SODIUM 900 UNIT(S)/HR: 10000 INJECTION INTRAVENOUS; SUBCUTANEOUS at 19:09

## 2024-10-26 RX ADMIN — HEPARIN SODIUM 900 UNIT(S)/HR: 10000 INJECTION INTRAVENOUS; SUBCUTANEOUS at 14:59

## 2024-10-26 RX ADMIN — Medication 1 APPLICATION(S): at 18:40

## 2024-10-26 RX ADMIN — BUSPIRONE HYDROCHLORIDE 10 MILLIGRAM(S): 15 TABLET ORAL at 10:24

## 2024-10-26 RX ADMIN — HEPARIN SODIUM 1100 UNIT(S)/HR: 10000 INJECTION INTRAVENOUS; SUBCUTANEOUS at 07:16

## 2024-10-26 RX ADMIN — HEPARIN SODIUM 1100 UNIT(S)/HR: 10000 INJECTION INTRAVENOUS; SUBCUTANEOUS at 00:54

## 2024-10-26 RX ADMIN — HEPARIN SODIUM 900 UNIT(S)/HR: 10000 INJECTION INTRAVENOUS; SUBCUTANEOUS at 13:30

## 2024-10-26 RX ADMIN — ALBUMIN HUMAN 1000 MILLILITER(S): 50 SOLUTION INTRAVENOUS at 16:56

## 2024-10-26 RX ADMIN — Medication 6 MILLIGRAM(S): at 22:33

## 2024-10-26 NOTE — PROGRESS NOTE ADULT - ASSESSMENT
53 y/o female PMH APS on warfarin, ESRD on HD, ?lupus on Plaquenil, CVA, right internal jugular vein thrombus, functional quadriplegia, seizure d/o on Vimpat, recent hospitalization at [facility] for MRSA endocarditis (finished vanc on 10/18), presented from [place] for hypotension, hypoxia and poor responsiveness/lethargy   Remained hypotensive in ED despite 2.5L IVF, started on pressors and admitted to CCU now downgraded to medical floor    #Hypotension/shock multifactorial - hypovolemic/medication   #Acute toxic encephalopathy likely due to meds/polypharmacy  #Acute hypoxic respiratory failure - resolved  home metoprolol restarted and BPs are at goal - MAPS>65  MADIHA - AOx1 - awake and alert - at baseline   continue Haldol/Ativan PRN for agitation     #Suspected GI bleed   #Acute blood loss anemia   Hgb 9.6 -> 7.0 -> s/p 1 unit > now 9.1   GI consulted Dr. Galicia: no EGD or c scope planned/necessary  FOBT + but no active melena/hematochezia. No urgent need for invasive intervention per GI   continue PPI BID PO  started on heparin to Warfarin bridge given stable hgb    #APS on warfarin   #Occluded right subclavian vein stent   Arterial duplex: Occluded or almost occluded medial portion of the right subclavian vein stent -> vascular consult: No acute surgical intervention needed, unlikely stent completely occulded based on physical exam, resume a/c  started home dose of warfarin 6mg - c/w heparin drip until goal INR of 2-3   will likely given another home dose of 6mg tomorrow  discussed with pharmacy    #Elevated troponin  Troponin 86 -> 281 -> 174  TTE as above with preserved LVEF, grade 1 LV diastolic dysfunction, +regional WMAs present  Cardiology consulted : not candidate for ischemic evaluation-> cont to tx medically, ASA, statin    #Recent MRSA endocarditis   completed tx with Vanco via PICC on 10/18  TTE without vegetations   BCx 10/18 Staph hominis and Staph capitis, CoNS 2 out of 4 bottles  BCx 10/20 negative   ID consulted , BCx from 10/18 likely contaminant, IV abx discontinued but then restarted for infected ulcers    #ESRD on HD   HD per renal     #Seizure d/o   #Hx CVA with dysphagia, functional quadriplegia   continue vimpat    Neurology consulted, unable to perform EEG due to lack of cooperation from pt  soft and bite sized diet    #Numerous bed sores/unstageable Sacral decubitus ulcer with concern for polymicrobial infection  s/p CFX 2gm iv qd  Wound culture 10/22 with ESBL Kleb, ESBL Proteus R Flouroquinolone and Bactrim, Bacteriodes   plan for IV Ertapenem 500mg q24 via midline for 10-day course (enddate: 11/3/2024)  surgery consult : recc chemical debridement , no sx debridement required at this time  [facility]  Dilaudid DCed    #Code status   palliative care following  pt is DNR, OK to intubate     #DVT ppx   Heparin to warfarin drip started on 10/26    Dispo: needs heparin to warfarin bridge; needs midline

## 2024-10-26 NOTE — PROGRESS NOTE ADULT - SUBJECTIVE AND OBJECTIVE BOX
NEPHROLOGY INTERVAL HPI/OVERNIGHT EVENTS:    Date of Service: 10-26-24 @ 16:05    10/26--Alert,  responding appropriately.  10/24--Alert, no distress.  denies pain today.  able to state her name.  10/22 seen at hd , no c/o  10/21 1unit prbc, no sob, comfortable   10/20 + melena for CT with ivc   10/19 seen at HD, alert awake   HPI:  54-year-old patient with past medical history for antiphospholipid syndrome, end-stage renal disease on dialysis Monday Wednesday Friday, acute embolism and thrombosis of the right internal jugular vein, high blood pressure, stroke, endocarditis, high cholesterol, presents emergency department for hypoxia, hypotension, less responsiveness.  Patient is awake,  soft  smoking and  answers few questions.  Patient currently with no pain  .  [place] [facility] called and informed that patient was found this morning satting 60%, blood pressure 87/55 and low temperature of 97.2.  Patient was sent to the ER for further evaluation.  Patient is a DNR, but   Not DNI  ICU called for consult  10/18: Hypotensive despite 1L IVF; add'l ordered, ESRD noted. Pressors ordered. Workup in progress.  (18 Oct 2024 11:35)  Patient is a 54y old  Female who presents with a chief complaint of Shock (18 Oct 2024 11:35)  ==============================================================================  NEPHROLOGY CONSULT  hx from chart and reivew of transfer document   esrd at [facility] on daily hd m-f   on MRSA endocard tx on vanc 500 mg end date 10/18 via left PICC line   admitted for shock with hypoxia   s/p CTA and a/p await results  on levofed   abg pending, sa02 not recordable   lethargic   has hx of APL on coumadin, with SLE on plaquenil   AVF left arm with + thrill and bruit  CCM input noted, unable to identify HCP     MEDICATIONS  (STANDING):  aspirin  chewable 81 milliGRAM(s) Oral daily  atorvastatin 40 milliGRAM(s) Oral at bedtime  busPIRone 10 milliGRAM(s) Oral two times a day  chlorhexidine 4% Liquid 1 Application(s) Topical <User Schedule>  cinacalcet 30 milliGRAM(s) Oral daily  Dakins Solution - 1/4 Strength 1 Application(s) Topical every 12 hours  epoetin fredi-epbx (RETACRIT) Injectable 67378 Unit(s) IV Push <User Schedule>  escitalopram 20 milliGRAM(s) Oral daily  heparin  Infusion. 900 Unit(s)/Hr (9 mL/Hr) IV Continuous <Continuous>  lacosamide 150 milliGRAM(s) Oral two times a day  meropenem Injectable 500 milliGRAM(s) IV Push every 24 hours  metoprolol succinate ER 25 milliGRAM(s) Oral daily  mupirocin 2% Nasal 1 Application(s) Both Nostrils two times a day  pantoprazole    Tablet 40 milliGRAM(s) Oral every 12 hours  polyethylene glycol 3350 17 Gram(s) Oral daily  traZODone 100 milliGRAM(s) Oral at bedtime  warfarin 6 milliGRAM(s) Oral once    MEDICATIONS  (PRN):  albumin human 25% IVPB 50 milliLiter(s) IV Intermittent <User Schedule> PRN SBP less [id number]  haloperidol    Injectable 5 milliGRAM(s) IV Push every 6 hours PRN agitation  heparin   Injectable 2500 Unit(s) IV Push every 6 hours PRN For aPTT between 40 - 57  heparin   Injectable 5500 Unit(s) IV Push every 6 hours PRN For aPTT less than 40  LORazepam   Injectable 1 milliGRAM(s) IV Push every 6 hours PRN Agitation    Vital Signs Last 24 Hrs  T(C): 36.4 (26 Oct 2024 14:24), Max: 37.1 (25 Oct 2024 16:09)  T(F): 97.6 (26 Oct 2024 14:24), Max: 98.8 (25 Oct 2024 16:09)  HR: 80 (26 Oct 2024 15:37) (80 - 90)  BP: 100/49 (26 Oct 2024 15:37) (96/73 - 120/55)  BP(mean): --  RR: 17 (26 Oct 2024 15:37) (17 - 19)  SpO2: 100% (26 Oct 2024 10:00) (96% - 100%)    Parameters below as of 26 Oct 2024 15:00  Patient On (Oxygen Delivery Method): room air    10-25 @ 07:01  -  10-26 @ 07:00  --------------------------------------------------------  IN: 110 mL / OUT: 0 mL / NET: 110 mL    PHYSICAL EXAM:  GENERAL: no distress  CHEST/LUNG: clear to aus  HEART: S1S2 RRR  ABDOMEN: soft  EXTREMITIES: no edema  SKIN:     LABS:                        9.1    13.27 )-----------( 183      ( 26 Oct 2024 06:14 )             29.5     10-26    135  |  100  |  20  ----------------------------<  95  3.6   |  30  |  5.14[H]    Ca    11.2[H]      26 Oct 2024 06:14      PT/INR - ( 26 Oct 2024 06:14 )   PT: 15.0 sec;   INR: 1.31 ratio         PTT - ( 26 Oct 2024 13:01 )  PTT:100.8 sec  Urinalysis Basic - ( 26 Oct 2024 06:14 )    Color: x / Appearance: x / SG: x / pH: x  Gluc: 95 mg/dL / Ketone: x  / Bili: x / Urobili: x   Blood: x / Protein: x / Nitrite: x   Leuk Esterase: x / RBC: x / WBC x   Sq Epi: x / Non Sq Epi: x / Bacteria: x              RADIOLOGY & ADDITIONAL TESTS:

## 2024-10-26 NOTE — PROGRESS NOTE ADULT - SUBJECTIVE AND OBJECTIVE BOX
HOSPITALIST ATTENDING PROGRESS NOTE    Chart and meds reviewed.  Patient seen and examined.    CC: Shock    Subjective: pt confused; answers all questions yes or maybe  or "a little bit" no matter what question is asked- no change from baseline per RN    All other systems reviewed and found to be negative with the exception of what has been described above.    MEDICATIONS  (STANDING):  aspirin  chewable 81 milliGRAM(s) Oral daily  atorvastatin 40 milliGRAM(s) Oral at bedtime  busPIRone 10 milliGRAM(s) Oral two times a day  chlorhexidine 4% Liquid 1 Application(s) Topical <User Schedule>  cinacalcet 30 milliGRAM(s) Oral daily  Dakins Solution - 1/4 Strength 1 Application(s) Topical every 12 hours  epoetin fredi-epbx (RETACRIT) Injectable 63065 Unit(s) IV Push <User Schedule>  escitalopram 20 milliGRAM(s) Oral daily  heparin  Infusion. 900 Unit(s)/Hr (9 mL/Hr) IV Continuous <Continuous>  lacosamide 150 milliGRAM(s) Oral two times a day  meropenem Injectable 500 milliGRAM(s) IV Push every 24 hours  metoprolol succinate ER 25 milliGRAM(s) Oral daily  mupirocin 2% Nasal 1 Application(s) Both Nostrils two times a day  pantoprazole    Tablet 40 milliGRAM(s) Oral every 12 hours  polyethylene glycol 3350 17 Gram(s) Oral daily  traZODone 100 milliGRAM(s) Oral at bedtime  warfarin 6 milliGRAM(s) Oral once    MEDICATIONS  (PRN):  haloperidol    Injectable 5 milliGRAM(s) IV Push every 6 hours PRN agitation  heparin   Injectable 2500 Unit(s) IV Push every 6 hours PRN For aPTT between 40 - 57  heparin   Injectable 5500 Unit(s) IV Push every 6 hours PRN For aPTT less than 40  HYDROmorphone  Injectable 0.5 milliGRAM(s) IV Push every 8 hours PRN Severe Pain (7 - 10)  LORazepam   Injectable 1 milliGRAM(s) IV Push every 6 hours PRN Agitation      VITALS:  T(F): 97.6 (10-26-24 @ 14:24), Max: 98.8 (10-25-24 @ 16:09)  HR: 82 (10-26-24 @ 15:00) (82 - 90)  BP: 120/55 (10-26-24 @ 15:00) (96/73 - 120/55)  RR: 17 (10-26-24 @ 15:00) (17 - 19)  SpO2: 100% (10-26-24 @ 10:00) (96% - 100%)  Wt(kg): --    I&O's Summary    25 Oct 2024 07:01  -  26 Oct 2024 07:00  --------------------------------------------------------  IN: 110 mL / OUT: 0 mL / NET: 110 mL        CAPILLARY BLOOD GLUCOSE          PHYSICAL EXAM:  Gen: NAD  CV:  +S1, +S2, regular, no murmurs or rubs  RESP:   lungs clear to auscultation bilaterally, no wheezing, rales, rhonchi, good air entry bilaterally  BREAST:  not examined  GI:  abdomen soft, non-tender, non-distended, normal BS, no bruits, no abdominal masses, no palpable masses  RECTAL:  not examined  :  not examined  MSK:   normal muscle tone, no atrophy, no rigidity, no contractions  EXT:  no clubbing, no cyanosis, no edema, no calf pain, swelling or erythema  VASCULAR:  pulses equal and symmetric in the upper and lower extremities  NEURO:  alert and awake; aoX1 follows commands  SKIN:  multiple ulcers - see flowsheets  LABS:                            9.1    13.27 )-----------( 183      ( 26 Oct 2024 06:14 )             29.5     10-26    135  |  100  |  20  ----------------------------<  95  3.6   |  30  |  5.14[H]    Ca    11.2[H]      26 Oct 2024 06:14            PT/INR - ( 26 Oct 2024 06:14 )   PT: 15.0 sec;   INR: 1.31 ratio         PTT - ( 26 Oct 2024 13:01 )  PTT:100.8 sec  Urinalysis Basic - ( 26 Oct 2024 06:14 )    Color: x / Appearance: x / SG: x / pH: x  Gluc: 95 mg/dL / Ketone: x  / Bili: x / Urobili: x   Blood: x / Protein: x / Nitrite: x   Leuk Esterase: x / RBC: x / WBC x   Sq Epi: x / Non Sq Epi: x / Bacteria: x

## 2024-10-26 NOTE — PROGRESS NOTE ADULT - ASSESSMENT
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) via lefr arm picc  rt AVF   hypercalcemia   SHock r/o sepsis     REC  - no urgent indication for hd now   - no identifiable reachable next of kin/hcp will plan for 2PC for HD consent   - rt AVF with + briuit, weak thrill   - hypercalcemia work-up including spep and upep   - fu pt/inr, pt may require temp hd catheter     coumadin on hold until results available   - pressor support with levophed  - s/p CTA with A/P await results  - echo   - abg   - fu cx send   dw RENITA Bedolla and Dr White    10/19 MK   - ESRD     hd today and will attempt uf with hd     via avf   - APL no now on iv heparin    rt subclavian clot     hx of cva with aphasia   - GPC sepsis     dapto and cefepime as per ID, renally dosed     fu cx     trend of outpt cx noted from id noted in sept cleared bacteremia     TTE no vegetation     ? need to dc PICC line   dw dr white and ccu team     10/20 MK   - ESRD      likely tts while inpt, sooner if clinically indicated     via avf, fx well with hd yesterday   - APL no now on iv heparin    rt subclavian clot     hx of cva with aphasia   - melena for hold of heparin     no renal barrier for IV contrast   - GPC sepsis     dapto and cefepime as per ID, renally dosed     fu cx     trend of outpt cx noted from id noted in sept cleared bacteremia     TTE no vegetation     ? need to dc PICC line   dw dr white and ccu team     10/21 Mk   - ESRD tts, AVF   - APL no now on iv heparin    rt subclavian clot     hx of cva with aphasia   - ? melena    gi input noted, brown stool     fu trend of h/h  - GPC / staph hominis sepsis     dapto and cefepime as per ID, renally dosed     fu repeat cx 10/20    trend of outpt cx noted from id noted in sept cleared bacteremia     TTE no vegetation   dw dr baca and  ccu team     10/22 MK   - ESRD tts, AVF     tolerating hd   - hypercalcemia      correction with HD on low calcium bath      may need to start sensipar at low dose based upong trend     pth 258  - APL no now on iv heparin    rt subclavian clot     hx of cva with aphasia   - ? melena    gi input noted, brown stool     fu trend of h/h    s/p 1unit of prbc 10/21     start arjun with hd 10K  - GPC / staph hominis sepsis     dapto and cefepime as per ID, renally dosed     fu repeat cx 10/20 NGTD    trend of outpt cx noted from id noted in sept cleared bacteremia     TTE no vegetation   dw dr baca and  ccu team     10/24 SY  --ESRD : HD order and tx reviewed.  Tolerating tx well.  --MBD: high Calcium level despite low calcium baths.  --likely due to inactivity rather than severe HPT.    Trial of Cinacalcet since Calcium level is trending up.  --Anemia : continue ARJUN with HD.  --APS : Warfarin restarted.    10/26 SY  --ESRD : HD order and tx reviewed.  Tolerating tx well.  --MBD: follow up Phos level.  Continue Cinacalcet for hypercalcemia.  --Anemia : continue ARJUN with HD.  --APS : A/c.

## 2024-10-27 LAB
ANION GAP SERPL CALC-SCNC: 6 MMOL/L — SIGNIFICANT CHANGE UP (ref 5–17)
APTT BLD: 58.3 SEC — HIGH (ref 24.5–35.6)
BUN SERPL-MCNC: 9 MG/DL — SIGNIFICANT CHANGE UP (ref 7–23)
CALCIUM SERPL-MCNC: 11.1 MG/DL — HIGH (ref 8.5–10.1)
CHLORIDE SERPL-SCNC: 101 MMOL/L — SIGNIFICANT CHANGE UP (ref 96–108)
CO2 SERPL-SCNC: 30 MMOL/L — SIGNIFICANT CHANGE UP (ref 22–31)
CREAT SERPL-MCNC: 3.43 MG/DL — HIGH (ref 0.5–1.3)
EGFR: 15 ML/MIN/1.73M2 — LOW
GLUCOSE SERPL-MCNC: 89 MG/DL — SIGNIFICANT CHANGE UP (ref 70–99)
HCT VFR BLD CALC: 31.4 % — LOW (ref 34.5–45)
HGB BLD-MCNC: 9.6 G/DL — LOW (ref 11.5–15.5)
INR BLD: 1.6 RATIO — HIGH (ref 0.85–1.16)
MCHC RBC-ENTMCNC: 30.6 GM/DL — LOW (ref 32–36)
MCHC RBC-ENTMCNC: 30.9 PG — SIGNIFICANT CHANGE UP (ref 27–34)
MCV RBC AUTO: 101 FL — HIGH (ref 80–100)
PLATELET # BLD AUTO: 218 K/UL — SIGNIFICANT CHANGE UP (ref 150–400)
POTASSIUM SERPL-MCNC: 3.5 MMOL/L — SIGNIFICANT CHANGE UP (ref 3.5–5.3)
POTASSIUM SERPL-SCNC: 3.5 MMOL/L — SIGNIFICANT CHANGE UP (ref 3.5–5.3)
PROTHROM AB SERPL-ACNC: 18.8 SEC — HIGH (ref 9.9–13.4)
RBC # BLD: 3.11 M/UL — LOW (ref 3.8–5.2)
RBC # FLD: 21 % — HIGH (ref 10.3–14.5)
SODIUM SERPL-SCNC: 137 MMOL/L — SIGNIFICANT CHANGE UP (ref 135–145)
WBC # BLD: 10.59 K/UL — HIGH (ref 3.8–10.5)
WBC # FLD AUTO: 10.59 K/UL — HIGH (ref 3.8–10.5)

## 2024-10-27 PROCEDURE — 99232 SBSQ HOSP IP/OBS MODERATE 35: CPT

## 2024-10-27 RX ORDER — WARFARIN SODIUM 4 MG
6 TABLET ORAL ONCE
Refills: 0 | Status: COMPLETED | OUTPATIENT
Start: 2024-10-27 | End: 2024-10-27

## 2024-10-27 RX ORDER — SODIUM CHLORIDE 9 MG/ML
250 INJECTION, SOLUTION INTRAMUSCULAR; INTRAVENOUS; SUBCUTANEOUS ONCE
Refills: 0 | Status: COMPLETED | OUTPATIENT
Start: 2024-10-27 | End: 2024-10-27

## 2024-10-27 RX ADMIN — Medication 81 MILLIGRAM(S): at 09:18

## 2024-10-27 RX ADMIN — Medication 1 APPLICATION(S): at 05:24

## 2024-10-27 RX ADMIN — PANTOPRAZOLE SODIUM 40 MILLIGRAM(S): 40 TABLET, DELAYED RELEASE ORAL at 22:44

## 2024-10-27 RX ADMIN — HEPARIN SODIUM 900 UNIT(S)/HR: 10000 INJECTION INTRAVENOUS; SUBCUTANEOUS at 04:48

## 2024-10-27 RX ADMIN — Medication 40 MILLIGRAM(S): at 22:44

## 2024-10-27 RX ADMIN — BUSPIRONE HYDROCHLORIDE 10 MILLIGRAM(S): 15 TABLET ORAL at 09:17

## 2024-10-27 RX ADMIN — SODIUM CHLORIDE 250 MILLILITER(S): 9 INJECTION, SOLUTION INTRAMUSCULAR; INTRAVENOUS; SUBCUTANEOUS at 17:04

## 2024-10-27 RX ADMIN — Medication 6 MILLIGRAM(S): at 22:44

## 2024-10-27 RX ADMIN — MEROPENEM 500 MILLIGRAM(S): 1 INJECTION INTRAVENOUS at 17:05

## 2024-10-27 RX ADMIN — BUSPIRONE HYDROCHLORIDE 10 MILLIGRAM(S): 15 TABLET ORAL at 22:44

## 2024-10-27 RX ADMIN — TRAZODONE HYDROCHLORIDE 100 MILLIGRAM(S): 100 TABLET ORAL at 22:43

## 2024-10-27 RX ADMIN — CINACALCET 30 MILLIGRAM(S): 30 TABLET, FILM COATED ORAL at 09:17

## 2024-10-27 RX ADMIN — HEPARIN SODIUM 900 UNIT(S)/HR: 10000 INJECTION INTRAVENOUS; SUBCUTANEOUS at 07:01

## 2024-10-27 RX ADMIN — HEPARIN SODIUM 900 UNIT(S)/HR: 10000 INJECTION INTRAVENOUS; SUBCUTANEOUS at 19:09

## 2024-10-27 RX ADMIN — LACOSAMIDE 150 MILLIGRAM(S): 100 TABLET ORAL at 22:44

## 2024-10-27 RX ADMIN — LACOSAMIDE 150 MILLIGRAM(S): 100 TABLET ORAL at 09:18

## 2024-10-27 RX ADMIN — ESCITALOPRAM 20 MILLIGRAM(S): 10 TABLET, FILM COATED ORAL at 09:18

## 2024-10-27 RX ADMIN — PANTOPRAZOLE SODIUM 40 MILLIGRAM(S): 40 TABLET, DELAYED RELEASE ORAL at 09:18

## 2024-10-27 RX ADMIN — Medication 1 APPLICATION(S): at 17:00

## 2024-10-27 NOTE — PROGRESS NOTE ADULT - SUBJECTIVE AND OBJECTIVE BOX
HOSPITALIST ATTENDING PROGRESS NOTE    Chart and meds reviewed.  Patient seen and examined.    CC: shock    Subjective: no acute events overnight - no bleeding episodes while on heparin to warfarin bridge     All other systems reviewed and found to be negative with the exception of what has been described above.    MEDICATIONS  (STANDING):  aspirin  chewable 81 milliGRAM(s) Oral daily  atorvastatin 40 milliGRAM(s) Oral at bedtime  busPIRone 10 milliGRAM(s) Oral two times a day  chlorhexidine 4% Liquid 1 Application(s) Topical <User Schedule>  cinacalcet 30 milliGRAM(s) Oral daily  Dakins Solution - 1/4 Strength 1 Application(s) Topical every 12 hours  epoetin fredi-epbx (RETACRIT) Injectable 16059 Unit(s) IV Push <User Schedule>  escitalopram 20 milliGRAM(s) Oral daily  heparin  Infusion. 900 Unit(s)/Hr (9 mL/Hr) IV Continuous <Continuous>  lacosamide 150 milliGRAM(s) Oral two times a day  meropenem Injectable 500 milliGRAM(s) IV Push every 24 hours  metoprolol succinate ER 25 milliGRAM(s) Oral daily  pantoprazole    Tablet 40 milliGRAM(s) Oral every 12 hours  polyethylene glycol 3350 17 Gram(s) Oral daily  traZODone 100 milliGRAM(s) Oral at bedtime  warfarin 6 milliGRAM(s) Oral once    MEDICATIONS  (PRN):  albumin human 25% IVPB 50 milliLiter(s) IV Intermittent <User Schedule> PRN SBP less [id number]  haloperidol    Injectable 5 milliGRAM(s) IV Push every 6 hours PRN agitation  heparin   Injectable 2500 Unit(s) IV Push every 6 hours PRN For aPTT between 40 - 57  heparin   Injectable 5500 Unit(s) IV Push every 6 hours PRN For aPTT less than 40  LORazepam   Injectable 1 milliGRAM(s) IV Push every 6 hours PRN Agitation      VITALS:  T(F): 98.2 (10-27-24 @ 07:50), Max: 98.2 (10-27-24 @ 07:50)  HR: 88 (10-27-24 @ 07:50) (72 - 94)  BP: 93/38 (10-27-24 @ 07:50) (87/37 - 108/68)  RR: 19 (10-27-24 @ 07:50) (16 - 19)  SpO2: 100% (10-27-24 @ 07:50) (98% - 100%)  Wt(kg): --    I&O's Summary    26 Oct 2024 07:01  -  27 Oct 2024 07:00  --------------------------------------------------------  IN: 90 mL / OUT: 1500 mL / NET: -1410 mL        CAPILLARY BLOOD GLUCOSE          PHYSICAL EXAM:  Gen: NAD  CV:  +S1, +S2, regular, no murmurs or rubs  RESP:   lungs clear to auscultation bilaterally, no wheezing, rales, rhonchi, good air entry bilaterally  BREAST:  not examined  GI:  abdomen soft, non-tender, non-distended, normal BS, no bruits, no abdominal masses, no palpable masses  RECTAL:  not examined  :  not examined  MSK:   normal muscle tone, no atrophy, no rigidity, no contractions  EXT:  no clubbing, no cyanosis, no edema, no calf pain, swelling or erythema  VASCULAR:  pulses equal and symmetric in the upper and lower extremities  NEURO:  alert and awake; aoX1 follows commands  SKIN:  multiple ulcers - see flowsheets      LABS:                            9.6    10.59 )-----------( 218      ( 27 Oct 2024 07:27 )             31.4     10-27    137  |  101  |  9   ----------------------------<  89  3.5   |  30  |  3.43[H]    Ca    11.1[H]      27 Oct 2024 07:27  Phos  3.2     10-26            PT/INR - ( 27 Oct 2024 03:38 )   PT: 18.8 sec;   INR: 1.60 ratio         PTT - ( 27 Oct 2024 03:38 )  PTT:58.3 sec  Urinalysis Basic - ( 27 Oct 2024 07:27 )    Color: x / Appearance: x / SG: x / pH: x  Gluc: 89 mg/dL / Ketone: x  / Bili: x / Urobili: x   Blood: x / Protein: x / Nitrite: x   Leuk Esterase: x / RBC: x / WBC x   Sq Epi: x / Non Sq Epi: x / Bacteria: x              CULTURES:      Additional results/Imaging, I have personally reviewed:    Telemetry, personally reviewed:

## 2024-10-27 NOTE — PROGRESS NOTE ADULT - ASSESSMENT
55 y/o female PMH APS on warfarin, ESRD on HD, ?lupus on Plaquenil, CVA, right internal jugular vein thrombus, functional quadriplegia, seizure d/o on Vimpat, recent hospitalization at [facility] for MRSA endocarditis (finished vanc on 10/18), presented from [place] for hypotension, hypoxia and poor responsiveness/lethargy   Remained hypotensive in ED despite 2.5L IVF, started on pressors and admitted to CCU now downgraded to medical floor    #Hypotension/shock multifactorial - hypovolemic/medication   #Acute toxic encephalopathy likely due to meds/polypharmacy  #Acute hypoxic respiratory failure - resolved  home metoprolol restarted and BPs are at goal - MAPS>65  MADIHA - AOx1 - awake and alert - at baseline   continue Haldol/Ativan PRN for agitation     #Suspected GI bleed   #Acute blood loss anemia   Hgb 9.6 -> 7.0 -> s/p 1 unit > now 9.1 >9.6  GI consulted Dr. Galicia: no EGD or c scope planned/necessary  FOBT + but no active melena/hematochezia. No urgent need for invasive intervention per GI   continue PPI BID PO  hgb stable - will c/w heparin to wafarin bridge    #APS on warfarin   #Occluded right subclavian vein stent   Arterial duplex: Occluded or almost occluded medial portion of the right subclavian vein stent -> vascular consult: No acute surgical intervention needed, unlikely stent completely occulded based on physical exam, resume a/c  s/p 7.5 warfarin on 10/25  started home dose of warfarin 6mg - c/w heparin drip until goal INR of 2-3   INR 1.60 today  will given another dose of 6mg warfarin tonight    #Elevated troponin  Troponin 86 -> 281 -> 174  TTE as above with preserved LVEF, grade 1 LV diastolic dysfunction, +regional WMAs present  Cardiology consulted : not candidate for ischemic evaluation-> cont to tx medically, ASA, statin    #Recent MRSA endocarditis   #multiple ulcers   completed tx with Vanco via PICC on 10/18  TTE without vegetations   BCx 10/18 Staph hominis and Staph capitis, CoNS 2 out of 4 bottles  BCx 10/20 negative   ID consulted , BCx from 10/18 likely contaminant, IV abx discontinued but then restarted for infected ulcers  Midline ordered    #ESRD on HD   HD per renal     #Seizure d/o   #Hx CVA with dysphagia, functional quadriplegia   continue vimpat    Neurology consulted, unable to perform EEG due to lack of cooperation from pt  soft and bite sized diet    #Numerous bed sores/unstageable Sacral decubitus ulcer with concern for polymicrobial infection  s/p CFX 2gm iv qd  Wound culture 10/22 with ESBL Kleb, ESBL Proteus R Flouroquinolone and Bactrim, Bacteriodes   plan for IV Ertapenem 500mg q24 via midline for 10-day course (enddate: 11/3/2024)  surgery consult : recc chemical debridement , no sx debridement required at this time  [facility]  Dilaudid [facility]  midline ordered    #Code status   palliative care following  pt is DNR, OK to intubate     #DVT ppx   Heparin to warfarin drip started on 10/26    Dispo: needs heparin to warfarin bridge; needs midline

## 2024-10-28 LAB
ADD ON TEST-SPECIMEN IN LAB: SIGNIFICANT CHANGE UP
APTT BLD: 74.2 SEC — HIGH (ref 24.5–35.6)
HCT VFR BLD CALC: 34.8 % — SIGNIFICANT CHANGE UP (ref 34.5–45)
HGB BLD-MCNC: 10.4 G/DL — LOW (ref 11.5–15.5)
INR BLD: 2.24 RATIO — HIGH (ref 0.85–1.16)
MCHC RBC-ENTMCNC: 29.9 GM/DL — LOW (ref 32–36)
MCHC RBC-ENTMCNC: 30.7 PG — SIGNIFICANT CHANGE UP (ref 27–34)
MCV RBC AUTO: 102.7 FL — HIGH (ref 80–100)
PLATELET # BLD AUTO: 191 K/UL — SIGNIFICANT CHANGE UP (ref 150–400)
PROTHROM AB SERPL-ACNC: 25.5 SEC — HIGH (ref 9.9–13.4)
RBC # BLD: 3.39 M/UL — LOW (ref 3.8–5.2)
RBC # FLD: 20.8 % — HIGH (ref 10.3–14.5)
WBC # BLD: 11.56 K/UL — HIGH (ref 3.8–10.5)
WBC # FLD AUTO: 11.56 K/UL — HIGH (ref 3.8–10.5)

## 2024-10-28 PROCEDURE — 99232 SBSQ HOSP IP/OBS MODERATE 35: CPT

## 2024-10-28 RX ORDER — MIDODRINE HYDROCHLORIDE 2.5 MG/1
5 TABLET ORAL THREE TIMES A DAY
Refills: 0 | Status: DISCONTINUED | OUTPATIENT
Start: 2024-10-28 | End: 2024-10-29

## 2024-10-28 RX ORDER — WARFARIN SODIUM 4 MG
2 TABLET ORAL ONCE
Refills: 0 | Status: COMPLETED | OUTPATIENT
Start: 2024-10-28 | End: 2024-10-28

## 2024-10-28 RX ORDER — WARFARIN SODIUM 4 MG
2.5 TABLET ORAL ONCE
Refills: 0 | Status: COMPLETED | OUTPATIENT
Start: 2024-10-28 | End: 2024-10-28

## 2024-10-28 RX ORDER — WARFARIN SODIUM 4 MG
5 TABLET ORAL ONCE
Refills: 0 | Status: DISCONTINUED | OUTPATIENT
Start: 2024-10-28 | End: 2024-10-28

## 2024-10-28 RX ADMIN — HEPARIN SODIUM 900 UNIT(S)/HR: 10000 INJECTION INTRAVENOUS; SUBCUTANEOUS at 07:27

## 2024-10-28 RX ADMIN — CHLORHEXIDINE GLUCONATE 1 APPLICATION(S): 40 SOLUTION TOPICAL at 10:28

## 2024-10-28 RX ADMIN — MIDODRINE HYDROCHLORIDE 5 MILLIGRAM(S): 2.5 TABLET ORAL at 21:08

## 2024-10-28 RX ADMIN — MIDODRINE HYDROCHLORIDE 5 MILLIGRAM(S): 2.5 TABLET ORAL at 12:02

## 2024-10-28 RX ADMIN — Medication 2.5 MILLIGRAM(S): at 21:07

## 2024-10-28 RX ADMIN — BUSPIRONE HYDROCHLORIDE 10 MILLIGRAM(S): 15 TABLET ORAL at 21:08

## 2024-10-28 RX ADMIN — HEPARIN SODIUM 900 UNIT(S)/HR: 10000 INJECTION INTRAVENOUS; SUBCUTANEOUS at 08:32

## 2024-10-28 RX ADMIN — Medication 2 MILLIGRAM(S): at 21:23

## 2024-10-28 RX ADMIN — PANTOPRAZOLE SODIUM 40 MILLIGRAM(S): 40 TABLET, DELAYED RELEASE ORAL at 10:27

## 2024-10-28 RX ADMIN — POLYETHYLENE GLYCOL 3350 17 GRAM(S): 17 POWDER, FOR SOLUTION ORAL at 10:28

## 2024-10-28 RX ADMIN — CINACALCET 30 MILLIGRAM(S): 30 TABLET, FILM COATED ORAL at 10:27

## 2024-10-28 RX ADMIN — PANTOPRAZOLE SODIUM 40 MILLIGRAM(S): 40 TABLET, DELAYED RELEASE ORAL at 21:09

## 2024-10-28 RX ADMIN — ESCITALOPRAM 20 MILLIGRAM(S): 10 TABLET, FILM COATED ORAL at 10:27

## 2024-10-28 RX ADMIN — TRAZODONE HYDROCHLORIDE 100 MILLIGRAM(S): 100 TABLET ORAL at 21:09

## 2024-10-28 RX ADMIN — Medication 1 APPLICATION(S): at 05:33

## 2024-10-28 RX ADMIN — MEROPENEM 500 MILLIGRAM(S): 1 INJECTION INTRAVENOUS at 17:25

## 2024-10-28 RX ADMIN — HEPARIN SODIUM 900 UNIT(S)/HR: 10000 INJECTION INTRAVENOUS; SUBCUTANEOUS at 19:26

## 2024-10-28 RX ADMIN — BUSPIRONE HYDROCHLORIDE 10 MILLIGRAM(S): 15 TABLET ORAL at 10:28

## 2024-10-28 RX ADMIN — Medication 1 APPLICATION(S): at 17:25

## 2024-10-28 RX ADMIN — LACOSAMIDE 150 MILLIGRAM(S): 100 TABLET ORAL at 21:08

## 2024-10-28 RX ADMIN — Medication 40 MILLIGRAM(S): at 21:09

## 2024-10-28 RX ADMIN — Medication 81 MILLIGRAM(S): at 10:27

## 2024-10-28 RX ADMIN — HEPARIN SODIUM 900 UNIT(S)/HR: 10000 INJECTION INTRAVENOUS; SUBCUTANEOUS at 19:24

## 2024-10-28 RX ADMIN — LACOSAMIDE 150 MILLIGRAM(S): 100 TABLET ORAL at 10:27

## 2024-10-28 NOTE — PROGRESS NOTE ADULT - SUBJECTIVE AND OBJECTIVE BOX
NEPHROLOGY INTERVAL HPI/OVERNIGHT EVENTS:  10-28-24 @ 12:11    10/28 alert, comfortable s/p midline placement   10/26--Alert,  responding appropriately.  10/24--Alert, no distress.  denies pain today.  able to state her name.  10/22 seen at hd , no c/o  10/21 1unit prbc, no sob, comfortable   10/20 + melena for CT with ivc   10/19 seen at HD, alert awake   HPI:  54-year-old patient with past medical history for antiphospholipid syndrome, end-stage renal disease on dialysis Monday Wednesday Friday, acute embolism and thrombosis of the right internal jugular vein, high blood pressure, stroke, endocarditis, high cholesterol, presents emergency department for hypoxia, hypotension, less responsiveness.  Patient is awake,  soft  smoking and  answers few questions.  Patient currently with no pain  .  [facility] called and informed that patient was found this morning satting 60%, blood pressure 87/55 and low temperature of 97.2.  Patient was sent to the ER for further evaluation.  Patient is a DNR, but   Not DNI  ICU called for consult  10/18: Hypotensive despite 1L IVF; add'l ordered, ESRD noted. Pressors ordered. Workup in progress.  (18 Oct 2024 11:35)  Patient is a 54y old  Female who presents with a chief complaint of Shock (18 Oct 2024 11:35)  ==============================================================================  NEPHROLOGY CONSULT  hx from chart and reivew of transfer document   esrd at [facility] on daily hd m-f   on MRSA endocard tx on vanc 500 mg end date 10/18 via left PICC line   admitted for shock with hypoxia   s/p CTA and a/p await results  on levofed   abg pending, sa02 not recordable   lethargic   has hx of APL on coumadin, with SLE on plaquenil   AVF left arm with + thrill and bruit  CCM input noted, unable to identify HCP     MEDICATIONS  (STANDING):  aspirin  chewable 81 milliGRAM(s) Oral daily  atorvastatin 40 milliGRAM(s) Oral at bedtime  busPIRone 10 milliGRAM(s) Oral two times a day  chlorhexidine 4% Liquid 1 Application(s) Topical <User Schedule>  cinacalcet 30 milliGRAM(s) Oral daily  Dakins Solution - 1/4 Strength 1 Application(s) Topical every 12 hours  epoetin fredi-epbx (RETACRIT) Injectable 49094 Unit(s) IV Push <User Schedule>  escitalopram 20 milliGRAM(s) Oral daily  heparin  Infusion. 900 Unit(s)/Hr (9 mL/Hr) IV Continuous <Continuous>  lacosamide 150 milliGRAM(s) Oral two times a day  meropenem Injectable 500 milliGRAM(s) IV Push every 24 hours  metoprolol succinate ER 25 milliGRAM(s) Oral daily  midodrine. 5 milliGRAM(s) Oral three times a day  pantoprazole    Tablet 40 milliGRAM(s) Oral every 12 hours  polyethylene glycol 3350 17 Gram(s) Oral daily  traZODone 100 milliGRAM(s) Oral at bedtime  warfarin 2 milliGRAM(s) Oral once  warfarin 2.5 milliGRAM(s) Oral once    MEDICATIONS  (PRN):  albumin human 25% IVPB 50 milliLiter(s) IV Intermittent <User Schedule> PRN SBP less [id number]  haloperidol    Injectable 5 milliGRAM(s) IV Push every 6 hours PRN agitation  heparin   Injectable 2500 Unit(s) IV Push every 6 hours PRN For aPTT between 40 - 57  heparin   Injectable 5500 Unit(s) IV Push every 6 hours PRN For aPTT less than 40      Allergies    No Known Allergies    Intolerances        I&O's Detail    27 Oct 2024 07:01  -  28 Oct 2024 07:00  --------------------------------------------------------  IN:    Heparin Infusion: 90 mL  Total IN: 90 mL    OUT:  Total OUT: 0 mL    Total NET: 90 m        Vital Signs Last 24 Hrs  T(C): 36.6 (28 Oct 2024 08:00), Max: 37 (27 Oct 2024 16:56)  T(F): 97.8 (28 Oct 2024 08:00), Max: 98.6 (27 Oct 2024 16:56)  HR: 90 (28 Oct 2024 10:00) (83 - 94)  BP: 94/62 (28 Oct 2024 10:00) (86/64 - 126/88)  BP(mean): --  RR: 18 (28 Oct 2024 08:00) (18 - 18)  SpO2: 94% (28 Oct 2024 08:00) (93% - 100%)    Parameters below as of 28 Oct 2024 08:00  Patient On (Oxygen Delivery Method): room air      Daily     Daily     PHYSICAL EXAM:  General: alert. awake NAD  HEENT: MMM  CV: s1s2 rrr  LUNGS: B/L CTA  EXT: no edema    LABS:                        10.4   11.56 )-----------( 191      ( 28 Oct 2024 07:28 )             34.8     10-27    137  |  101  |  9   ----------------------------<  89  3.5   |  30  |  3.43[H]    Ca    11.1[H]      27 Oct 2024 07:27      PT/INR - ( 28 Oct 2024 07:28 )   PT: 25.5 sec;   INR: 2.24 ratio         PTT - ( 28 Oct 2024 07:28 )  PTT:74.2 sec  Urinalysis Basic - ( 27 Oct 2024 07:27 )    Color: x / Appearance: x / SG: x / pH: x  Gluc: 89 mg/dL / Ketone: x  / Bili: x / Urobili: x   Blood: x / Protein: x / Nitrite: x   Leuk Esterase: x / RBC: x / WBC x   Sq Epi: x / Non Sq Epi: x / Bacteria: x

## 2024-10-28 NOTE — ADVANCED PRACTICE NURSE CONSULT - ASSESSMENT
Procedure Note: Vascular Access    Procedure Name: LUE Midline Placement    Time out: Patient’s first and last name, , MRN, procedure and correct site confirmed prior to start of procedure.    Procedure Date and Time: 10/28/24 0900    Informed Consent: Benefits, risks, and possible complications of procedure explained to patient/caregiver who verbalized understanding and gave verbal consent.    Local Anesthesia: n/a    Procedure findings and Details: Successful placement of LUE single lumen Midline (PowerGlide Pro Midline Lot#[id number]) via L basilic vein inserted under ultrasound guidance. Midline line trimmed to 10cm.    Follow up: CXR not needed for placement verification. OK to use. Report given to Mary Jo RN @ bedside for procedure.
This is a 54-year-old patient and per MD H&P "with past medical history for antiphospholipid syndrome, end-stage renal disease on dialysis Monday Wednesday Friday, acute embolism and thrombosis of the right internal jugular vein, high blood pressure, stroke, endocarditis, high cholesterol, presents emergency department for hypoxia, hypotension, less responsiveness.  Patient is awake,  soft  smoking and  answers few questions.  Patient currently with no pain  .  [facility] called and informed that patient was found this morning satting 60%, blood pressure 87/55 and low temperature of 97.2.  Patient was sent to the ER for further evaluation.  Patient is a DNR, but   Not DNI    ICU called for consult    10/18: Hypotensive despite 1L IVF; add'l ordered, ESRD noted. Pressors ordered. Workup in progress.     Assessed patient on CCU and patient lying in [facility] Low Air loss Mattress   Patient incontinent of stool     Sacral wound full thickness 6cm x 8cm x 0.5cm and 12 - 12 o'c with undermining deepest 4.5cm @ 7 o'c with black/brown/yellow firmly attached slough and malodor with brown malodors drainage and probed to the bone classifying this as a stage 4 pressure injury Obtained wound culture swab. Recommend surgical consult to assess for potential debridement. In the meantime, Recommend and spoke with Dr Prather to Irrigate wound with 1/4 strength dakins solution and flush with saline pat dry, apply 1/4 strength dakins solution to gauze packing (óscar) and fluff in wound bed and undermining for chemical debridement and cover with sacral foam change two times per day and prn if dressing is compromised.     Left Ischium with total area partial thickness skin loss 1cm x 4cm x 0.1cm with pink wound bed classifying this as a stage 2 pressure injury. Did note to the periwound with hypopigmented healed skin that has not pigmented yet. Recommend Triad cream daily and prn when soiled.     Left Heel with 2cm x 2cm nonblanchable purplish black skin with noted callus. This can be classified as a suspected deep tissue injury. skin appears stable at this time and if this is a deep tissue injury this will evolve Continue to offload heels with cair boots for pressure relief. Assess each shift     Right Heel with 2cm x 2cm nonblanchable purplish black skin with noted callus. This can be classified as a suspected deep tissue injury. skin appears stable at this time and if this is a deep tissue injury this will evolve Continue to offload heels with cair boots for pressure relief. Assess each shift

## 2024-10-28 NOTE — PROGRESS NOTE ADULT - SUBJECTIVE AND OBJECTIVE BOX
HOSPITALIST ATTENDING PROGRESS NOTE    Chart and meds reviewed.  Patient seen and examined.    CC: hypotension    Subjective: pt persistently hypotensive despite stable hgb; intermittently yelling in room    All other systems reviewed and found to be negative with the exception of what has been described above.    MEDICATIONS  (STANDING):  aspirin  chewable 81 milliGRAM(s) Oral daily  atorvastatin 40 milliGRAM(s) Oral at bedtime  busPIRone 10 milliGRAM(s) Oral two times a day  chlorhexidine 4% Liquid 1 Application(s) Topical <User Schedule>  cinacalcet 30 milliGRAM(s) Oral daily  Dakins Solution - 1/4 Strength 1 Application(s) Topical every 12 hours  epoetin fredi-epbx (RETACRIT) Injectable 58212 Unit(s) IV Push <User Schedule>  escitalopram 20 milliGRAM(s) Oral daily  heparin  Infusion. 900 Unit(s)/Hr (9 mL/Hr) IV Continuous <Continuous>  lacosamide 150 milliGRAM(s) Oral two times a day  meropenem Injectable 500 milliGRAM(s) IV Push every 24 hours  metoprolol succinate ER 25 milliGRAM(s) Oral daily  midodrine. 5 milliGRAM(s) Oral three times a day  pantoprazole    Tablet 40 milliGRAM(s) Oral every 12 hours  polyethylene glycol 3350 17 Gram(s) Oral daily  traZODone 100 milliGRAM(s) Oral at bedtime  warfarin 2 milliGRAM(s) Oral once  warfarin 2.5 milliGRAM(s) Oral once    MEDICATIONS  (PRN):  albumin human 25% IVPB 50 milliLiter(s) IV Intermittent <User Schedule> PRN SBP less [id number]  haloperidol    Injectable 5 milliGRAM(s) IV Push every 6 hours PRN agitation  heparin   Injectable 2500 Unit(s) IV Push every 6 hours PRN For aPTT between 40 - 57  heparin   Injectable 5500 Unit(s) IV Push every 6 hours PRN For aPTT less than 40      VITALS:  T(F): 97.3 (10-28-24 @ 16:37), Max: 98.6 (10-28-24 @ 07:21)  HR: 101 (10-28-24 @ 16:37) (83 - 101)  BP: 90/54 (10-28-24 @ 16:37) (86/64 - 126/88)  RR: 19 (10-28-24 @ 16:37) (18 - 19)  SpO2: 100% (10-28-24 @ 16:37) (93% - 100%)  Wt(kg): --    I&O's Summary    27 Oct 2024 07:01  -  28 Oct 2024 07:00  --------------------------------------------------------  IN: 90 mL / OUT: 0 mL / NET: 90 mL        CAPILLARY BLOOD GLUCOSE          PHYSICAL EXAM:  Gen: NAD  CV:  +S1, +S2, regular, no murmurs or rubs  RESP:   lungs clear to auscultation bilaterally, no wheezing, rales, rhonchi, good air entry bilaterally  BREAST:  not examined  GI:  abdomen soft, non-tender, non-distended, normal BS, no bruits, no abdominal masses, no palpable masses  RECTAL:  not examined  :  not examined  MSK:   normal muscle tone, no atrophy, no rigidity, no contractions  EXT:  no clubbing, no cyanosis, no edema, no calf pain, swelling or erythema  VASCULAR:  pulses equal and symmetric in the upper and lower extremities  NEURO:  alert and awake; aoX1 follows commands  SKIN:  multiple ulcers - see flowsheets    LABS:                            10.4   11.56 )-----------( 191      ( 28 Oct 2024 07:28 )             34.8     10-27    137  |  101  |  9   ----------------------------<  89  3.5   |  30  |  3.43[H]    Ca    11.1[H]      27 Oct 2024 07:27            PT/INR - ( 28 Oct 2024 07:28 )   PT: 25.5 sec;   INR: 2.24 ratio         PTT - ( 28 Oct 2024 07:28 )  PTT:74.2 sec  Urinalysis Basic - ( 27 Oct 2024 07:27 )    Color: x / Appearance: x / SG: x / pH: x  Gluc: 89 mg/dL / Ketone: x  / Bili: x / Urobili: x   Blood: x / Protein: x / Nitrite: x   Leuk Esterase: x / RBC: x / WBC x   Sq Epi: x / Non Sq Epi: x / Bacteria: x              CULTURES:      Additional results/Imaging, I have personally reviewed:    Telemetry, personally reviewed:

## 2024-10-28 NOTE — PROGRESS NOTE ADULT - ASSESSMENT
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) via lefr arm picc  rt AVF   hypercalcemia   SHock r/o sepsis     REC  - no urgent indication for hd now   - no identifiable reachable next of kin/hcp will plan for 2PC for HD consent   - rt AVF with + briuit, weak thrill   - hypercalcemia work-up including spep and upep   - fu pt/inr, pt may require temp hd catheter     coumadin on hold until results available   - pressor support with levophed  - s/p CTA with A/P await results  - echo   - abg   - fu cx send   dw RENITA Bedolla and Dr White    10/19 MK   - ESRD     hd today and will attempt uf with hd     via avf   - APL no now on iv heparin    rt subclavian clot     hx of cva with aphasia   - GPC sepsis     dapto and cefepime as per ID, renally dosed     fu cx     trend of outpt cx noted from id noted in sept cleared bacteremia     TTE no vegetation     ? need to dc PICC line   dw dr white and ccu team     10/20 MK   - ESRD      likely tts while inpt, sooner if clinically indicated     via avf, fx well with hd yesterday   - APL no now on iv heparin    rt subclavian clot     hx of cva with aphasia   - melena for hold of heparin     no renal barrier for IV contrast   - GPC sepsis     dapto and cefepime as per ID, renally dosed     fu cx     trend of outpt cx noted from id noted in sept cleared bacteremia     TTE no vegetation     ? need to dc PICC line   dw dr white and ccu team     10/21 Mk   - ESRD tts, AVF   - APL no now on iv heparin    rt subclavian clot     hx of cva with aphasia   - ? melena    gi input noted, brown stool     fu trend of h/h  - GPC / staph hominis sepsis     dapto and cefepime as per ID, renally dosed     fu repeat cx 10/20    trend of outpt cx noted from id noted in sept cleared bacteremia     TTE no vegetation   dw dr baca and  ccu team     10/22 MK   - ESRD tts, AVF     tolerating hd   - hypercalcemia      correction with HD on low calcium bath      may need to start sensipar at low dose based upong trend     pth 258  - APL no now on iv heparin    rt subclavian clot     hx of cva with aphasia   - ? melena    gi input noted, brown stool     fu trend of h/h    s/p 1unit of prbc 10/21     start arjun with hd 10K  - GPC / staph hominis sepsis     dapto and cefepime as per ID, renally dosed     fu repeat cx 10/20 NGTD    trend of outpt cx noted from id noted in sept cleared bacteremia     TTE no vegetation   dw dr baca and  ccu team     10/24 SY  --ESRD : HD order and tx reviewed.  Tolerating tx well.  --MBD: high Calcium level despite low calcium baths.  --likely due to inactivity rather than severe HPT.    Trial of Cinacalcet since Calcium level is trending up.  --Anemia : continue ARJUN with HD.  --APS : Warfarin restarted.    10/26 SY  --ESRD : HD order and tx reviewed.  Tolerating tx well.  --MBD: follow up Phos level.  Continue Cinacalcet for hypercalcemia.  --Anemia : continue ARJUN with HD.  --APS : A/c.    10/28 mk   - ESRD tts, AVF     he in am   - hypercalcemia      cinacalcet started, fu trend   - APL no now on iv heparin bridge to coumadin     rt subclavian clot     hx of cva with aphasia   -anemia      arjun with hd 10K  - Decub  polymicrobial GPC / staph hominis sepsis      meropenem renally dosed,     TTE no vegetation   dc planning in progress, pt will resume home HD schedule as per [facility]PEX

## 2024-10-28 NOTE — PROGRESS NOTE ADULT - ASSESSMENT
53 y/o female PMH APS on warfarin, ESRD on HD, ?lupus on Plaquenil, CVA, right internal jugular vein thrombus, functional quadriplegia, seizure d/o on Vimpat, recent hospitalization at [facility] for MRSA endocarditis (finished vanc on 10/18), presented from [place] for hypotension, hypoxia and poor responsiveness/lethargy   Remained hypotensive in ED despite 2.5L IVF, started on pressors and admitted to CCU now downgraded to medical floor    #Hypotension/shock multifactorial - hypovolemic/medication   #Acute toxic encephalopathy (resolved) - pt at baseline  #Acute hypoxic respiratory failure - resolved  metoprolol restarted but pt now hypotensive over the last 48 hours despite stable hgb  attempted 250cc bolus yesterday with no response in bp  pt unable to participate in orthostatic blood pressure 2/2 encephalopathy and contracted extremities - will empirically treat with midodrine   MADIHA - AOx1 - awake and alert - at baseline   continue Haldol/Ativan PRN for agitation     #Suspected GI bleed   #Acute blood loss anemia   Hgb 9.6 -> 7.0 -> s/p 1 unit > now 9.1 >9.6>10.4  GI consulted Dr. Galicia: no EGD or c scope planned/necessary  FOBT + but no active melena/hematochezia. No urgent need for invasive intervention per GI   continue PPI BID PO  hgb stable - will c/w heparin to wafarin bridge    #APS on warfarin   #Occluded right subclavian vein stent   Arterial duplex: Occluded or almost occluded medial portion of the right subclavian vein stent -> vascular consult: No acute surgical intervention needed, unlikely stent completely occulded based on physical exam, resume a/c  s/p 7.5 warfarin on 10/25  started home dose of warfarin 6mg - c/w heparin drip until goal INR of 2-3   INR 2.24 today  will continue heparin drip for another 24 hours while INR is therapeutic  will given 4.5mg dose tonight to avoid supratherapeutic inr   if INR remains 2-3 tomorrow morning, dc heparin drip and continue 6mg po qd home dose of warfarin    #Elevated troponin  Troponin 86 -> 281 -> 174  TTE as above with preserved LVEF, grade 1 LV diastolic dysfunction, +regional WMAs present  Cardiology consulted : not candidate for ischemic evaluation-> cont to tx medically, ASA, statin    #Recent MRSA endocarditis   #multiple ulcers   completed tx with Vanco via PICC on 10/18  TTE without vegetations   BCx 10/18 Staph hominis and Staph capitis, CoNS 2 out of 4 bottles  BCx 10/20 negative   ID consulted , BCx from 10/18 likely contaminant, IV abx discontinued but then restarted for infected ulcers  Midline placed today - will switch to ertapenam upon dc    #ESRD on HD   HD per renal  planned for tomorrow morning - TTS shedule    #hypercalcemic   started on cinacalcet   management per nephro     #Seizure d/o   #Hx CVA with dysphagia, functional quadriplegia   continue vimpat    Neurology consulted, unable to perform EEG due to lack of cooperation from pt  soft and bite sized diet    #Numerous bed sores/unstageable Sacral decubitus ulcer with concern for polymicrobial infection  s/p CFX 2gm iv qd  Wound culture 10/22 with ESBL Kleb, ESBL Proteus R Flouroquinolone and Bactrim, Bacteriodes   plan for IV Ertapenem 500mg q24 via midline for 10-day course (enddate: 11/3/2024)  surgery consult : recc chemical debridement , no sx debridement required at this time  [facility]  Dilaudid [facility]ed  midline placed 10/28  will switch to ertapenam upon dc    #Code status   palliative care following  pt is DNR, OK to intubate     #DVT ppx   Heparin to warfarin drip started on 10/26    Dispo: needs heparin to warfarin bridge; monitor hypotension

## 2024-10-28 NOTE — PROGRESS NOTE ADULT - TIME BILLING
.
Time spent  coordinating the patient's care. This includes reviewing documentation pertinent to this admission, results and imaging. Further tests, medications, and procedures have been ordered as indicated. Laboratory results and the plan of care were communicated to the patient. . Supporting documentation was completed and added to the patient's chart.
Time spent  coordinating the patient's care. This includes reviewing documentation pertinent to this admission, results and imaging. Further tests, medications, and procedures have been ordered as indicated. Laboratory results and the plan of care were communicated to the patient.  Supporting documentation was completed and added to the patient's chart.
Time spent  coordinating the patient's care. This includes reviewing documentation pertinent to this admission, results and imaging. Further tests, medications, and procedures have been ordered as indicated. Laboratory results and the plan of care were communicated to the patient. Discussed care plan with consultants including ID . Supporting documentation was completed and added to the patient's chart.

## 2024-10-29 ENCOUNTER — TRANSCRIPTION ENCOUNTER (OUTPATIENT)
Age: 54
End: 2024-10-29

## 2024-10-29 VITALS
OXYGEN SATURATION: 100 % | HEART RATE: 99 BPM | SYSTOLIC BLOOD PRESSURE: 104 MMHG | TEMPERATURE: 99 F | RESPIRATION RATE: 16 BRPM | DIASTOLIC BLOOD PRESSURE: 58 MMHG

## 2024-10-29 LAB
ADD ON TEST-SPECIMEN IN LAB: SIGNIFICANT CHANGE UP
ALBUMIN SERPL ELPH-MCNC: 2.6 G/DL — LOW (ref 3.3–5)
ANION GAP SERPL CALC-SCNC: 6 MMOL/L — SIGNIFICANT CHANGE UP (ref 5–17)
APTT BLD: 144.4 SEC — CRITICAL HIGH (ref 24.5–35.6)
BUN SERPL-MCNC: 27 MG/DL — HIGH (ref 7–23)
CALCIUM SERPL-MCNC: 11.1 MG/DL — HIGH (ref 8.5–10.1)
CHLORIDE SERPL-SCNC: 101 MMOL/L — SIGNIFICANT CHANGE UP (ref 96–108)
CO2 SERPL-SCNC: 28 MMOL/L — SIGNIFICANT CHANGE UP (ref 22–31)
CREAT SERPL-MCNC: 6.6 MG/DL — HIGH (ref 0.5–1.3)
EGFR: 7 ML/MIN/1.73M2 — LOW
GLUCOSE SERPL-MCNC: 100 MG/DL — HIGH (ref 70–99)
HCT VFR BLD CALC: 27.2 % — LOW (ref 34.5–45)
HGB BLD-MCNC: 8.6 G/DL — LOW (ref 11.5–15.5)
INR BLD: 3.05 RATIO — HIGH (ref 0.85–1.16)
MCHC RBC-ENTMCNC: 31 PG — SIGNIFICANT CHANGE UP (ref 27–34)
MCHC RBC-ENTMCNC: 31.6 GM/DL — LOW (ref 32–36)
MCV RBC AUTO: 98.2 FL — SIGNIFICANT CHANGE UP (ref 80–100)
PHOSPHATE SERPL-MCNC: 3.5 MG/DL — SIGNIFICANT CHANGE UP (ref 2.5–4.5)
PLATELET # BLD AUTO: 228 K/UL — SIGNIFICANT CHANGE UP (ref 150–400)
POTASSIUM SERPL-MCNC: 4 MMOL/L — SIGNIFICANT CHANGE UP (ref 3.5–5.3)
POTASSIUM SERPL-SCNC: 4 MMOL/L — SIGNIFICANT CHANGE UP (ref 3.5–5.3)
PROTHROM AB SERPL-ACNC: 35.6 SEC — HIGH (ref 9.9–13.4)
RBC # BLD: 2.77 M/UL — LOW (ref 3.8–5.2)
RBC # FLD: 20 % — HIGH (ref 10.3–14.5)
SODIUM SERPL-SCNC: 135 MMOL/L — SIGNIFICANT CHANGE UP (ref 135–145)
WBC # BLD: 12.24 K/UL — HIGH (ref 3.8–10.5)
WBC # FLD AUTO: 12.24 K/UL — HIGH (ref 3.8–10.5)

## 2024-10-29 PROCEDURE — 99232 SBSQ HOSP IP/OBS MODERATE 35: CPT

## 2024-10-29 PROCEDURE — 99239 HOSP IP/OBS DSCHRG MGMT >30: CPT

## 2024-10-29 RX ORDER — ERTAPENEM SODIUM 1 G/1
0.5 INJECTION, POWDER, LYOPHILIZED, FOR SOLUTION INTRAMUSCULAR; INTRAVENOUS
Qty: 3 | Refills: 0
Start: 2024-10-29 | End: 2024-11-03

## 2024-10-29 RX ORDER — PANTOPRAZOLE SODIUM 40 MG/1
1 TABLET, DELAYED RELEASE ORAL
Refills: 0 | DISCHARGE

## 2024-10-29 RX ORDER — PANTOPRAZOLE SODIUM 40 MG/1
1 TABLET, DELAYED RELEASE ORAL
Qty: 180 | Refills: 0
Start: 2024-10-29 | End: 2025-01-26

## 2024-10-29 RX ORDER — NIFEDIPINE 90 MG
1 TABLET, EXTENDED RELEASE 24 HR ORAL
Refills: 0 | DISCHARGE

## 2024-10-29 RX ORDER — CINACALCET 30 MG/1
1 TABLET, FILM COATED ORAL
Qty: 0 | Refills: 0 | DISCHARGE
Start: 2024-10-29

## 2024-10-29 RX ORDER — ASPIRIN/MAG CARB/ALUMINUM AMIN 325 MG
1 TABLET ORAL
Qty: 0 | Refills: 0 | DISCHARGE
Start: 2024-10-29

## 2024-10-29 RX ORDER — MIDODRINE HYDROCHLORIDE 2.5 MG/1
1 TABLET ORAL
Qty: 0 | Refills: 0 | DISCHARGE
Start: 2024-10-29

## 2024-10-29 RX ORDER — ERYTHROPOIETIN 10000 [IU]/ML
10000 INJECTION, SOLUTION INTRAVENOUS; SUBCUTANEOUS
Qty: 0 | Refills: 0 | DISCHARGE
Start: 2024-10-29

## 2024-10-29 RX ADMIN — ALBUMIN HUMAN 1000 MILLILITER(S): 50 SOLUTION INTRAVENOUS at 11:37

## 2024-10-29 RX ADMIN — POLYETHYLENE GLYCOL 3350 17 GRAM(S): 17 POWDER, FOR SOLUTION ORAL at 09:50

## 2024-10-29 RX ADMIN — ERYTHROPOIETIN 10000 UNIT(S): 10000 INJECTION, SOLUTION INTRAVENOUS; SUBCUTANEOUS at 10:48

## 2024-10-29 RX ADMIN — HALOPERIDOL DECANOATE 5 MILLIGRAM(S): 50 INJECTION INTRAMUSCULAR at 10:40

## 2024-10-29 RX ADMIN — HEPARIN SODIUM 0 UNIT(S)/HR: 10000 INJECTION INTRAVENOUS; SUBCUTANEOUS at 11:17

## 2024-10-29 RX ADMIN — PANTOPRAZOLE SODIUM 40 MILLIGRAM(S): 40 TABLET, DELAYED RELEASE ORAL at 09:52

## 2024-10-29 RX ADMIN — MIDODRINE HYDROCHLORIDE 5 MILLIGRAM(S): 2.5 TABLET ORAL at 05:47

## 2024-10-29 RX ADMIN — MIDODRINE HYDROCHLORIDE 5 MILLIGRAM(S): 2.5 TABLET ORAL at 14:46

## 2024-10-29 RX ADMIN — BUSPIRONE HYDROCHLORIDE 10 MILLIGRAM(S): 15 TABLET ORAL at 09:50

## 2024-10-29 RX ADMIN — Medication 1 APPLICATION(S): at 05:46

## 2024-10-29 RX ADMIN — HEPARIN SODIUM 900 UNIT(S)/HR: 10000 INJECTION INTRAVENOUS; SUBCUTANEOUS at 07:36

## 2024-10-29 RX ADMIN — HEPARIN SODIUM 900 UNIT(S)/HR: 10000 INJECTION INTRAVENOUS; SUBCUTANEOUS at 10:12

## 2024-10-29 RX ADMIN — CHLORHEXIDINE GLUCONATE 1 APPLICATION(S): 40 SOLUTION TOPICAL at 05:46

## 2024-10-29 RX ADMIN — ESCITALOPRAM 20 MILLIGRAM(S): 10 TABLET, FILM COATED ORAL at 09:50

## 2024-10-29 RX ADMIN — Medication 81 MILLIGRAM(S): at 09:52

## 2024-10-29 RX ADMIN — CINACALCET 30 MILLIGRAM(S): 30 TABLET, FILM COATED ORAL at 09:50

## 2024-10-29 RX ADMIN — LACOSAMIDE 150 MILLIGRAM(S): 100 TABLET ORAL at 09:51

## 2024-10-29 RX ADMIN — MEROPENEM 500 MILLIGRAM(S): 1 INJECTION INTRAVENOUS at 16:48

## 2024-10-29 NOTE — PROGRESS NOTE ADULT - REASON FOR ADMISSION
Shock

## 2024-10-29 NOTE — DISCHARGE NOTE NURSING/CASE MANAGEMENT/SOCIAL WORK - FINANCIAL ASSISTANCE
[facility] provides services at a reduced cost to those who are determined to be eligible through [facility]’s financial assistance program. Information regarding [facility]’s financial assistance program can be found by going to [email or web address] or by calling [phone].

## 2024-10-29 NOTE — DISCHARGE NOTE NURSING/CASE MANAGEMENT/SOCIAL WORK - NSPROMEDSBROUGHTTOHOSP_GEN_A_NUR
no
[FreeTextEntry1] : \par 2 y/o male with few episodes of vomiting, resolved fever. \par To maintain hydration consume "oral rehydration solution," such as Pedialyte/Pedialyte pops, water. \par Avoid drinking juice or soda or milk -- These can make diarrhea worse. \par If tolerating solids- bland diet/BRAT -- lean meats, fruits, vegetables, and whole-grain breads and cereals. Avoid eating foods with a lot of fat or sugar or foods that are fried/greasy, which can make symptoms worse.\par Will check UA to r/o UTI - cup given dad will return with sample. \par May use Cetirizine/flonase qHS as directed PRN congestion/runny nose. \par RED FLAGS REVIEWED - indications for ED eval discussed, signs of distress/dehydration - signs of acute abdomen discussed --  dad agrees with plan, demonstrates an understanding, is able to repeat back instructions and has no questions at this time. \par Return sooner PRN. \par Well care as scheduled.\par

## 2024-10-29 NOTE — PROVIDER CONTACT NOTE (CRITICAL VALUE NOTIFICATION) - SITUATION
pt on IV  heparin at 9 ml per Hr  per protocol turned off x 1 hr then restart at 7 ml per hr per protocol

## 2024-10-29 NOTE — DISCHARGE NOTE PROVIDER - NSDCCPCAREPLAN_GEN_ALL_CORE_FT
PRINCIPAL DISCHARGE DIAGNOSIS  Diagnosis: Hypotensive episode  Assessment and Plan of Treatment: possibly related to transient GI bleed wcich spontaneously resolved; possibly exercerbated by autonomic dysfunction - pt started on midodrine - please titrate up as necessary

## 2024-10-29 NOTE — DISCHARGE NOTE NURSING/CASE MANAGEMENT/SOCIAL WORK - NSDCPEFALRISK_GEN_ALL_CORE
For information on Fall & Injury Prevention, visit: [email or web address] OR  [email or web address] OR  https://www.cdc.gov/steadi/patient.html

## 2024-10-29 NOTE — DISCHARGE NOTE PROVIDER - HOSPITAL COURSE
55 y/o female PMH APS on warfarin, ESRD on HD, ?lupus on Plaquenil, CVA, right internal jugular vein thrombus, functional quadriplegia, seizure d/o on Vimpat, recent hospitalization at [facility] for MRSA endocarditis (finished vanc on 10/18), presented from [place] for hypotension, hypoxia and poor responsiveness/lethargy   Remained hypotensive in ED despite 2.5L IVF, started on pressors and admitted to CCU and then downgraded to medical floor.  Regarding hypotension initially thought to be due to possible GI bleed.  Patient received transfusion while admitted and hemoglobin responded appropriately however hypotension persisted.  It appears that she may be having autonomic dysfunction.  Unable to test for orthostatics given patient's cooperation and contracted state therefore started on midodrine empirically with improvement of blood pressures.  Nifedipine was discontinued however metoprolol was continued given her tachycardia.There was no GI procedure performed that she had deemed that there was no active bleed her warfarin was held and once stable she needed to bridge again with heparin. pt had 2 therapeutic INRs prior to dc while on heparing drip. SHe may continue her warfarin 6mg po qd. Regarding her hypercalcemia.  Nephrology was consulted and patient was started on Cinacalcet.  Per nephrology it may take 2 weeks for it to improve    #Hypotension/shock multifactorial - hypovolemic/medication   #Acute toxic encephalopathy (resolved) - pt at baseline  #Acute hypoxic respiratory failure - resolved  metoprolol restarted but pt now hypotensive over the last 48 hours despite stable hgb  attempted 250cc bolus yesterday with no response in bp  pt unable to participate in orthostatic blood pressure 2/2 encephalopathy and contracted extremities - will empirically treat with midodrine  SBP's greater than 100 with midodrine we will continue  Continue to hold nifedipine   MADIHA - AOx1 - awake and alert - at baseline   continue Haldol/Ativan PRN for agitation     #Suspected GI bleed   #Acute blood loss anemia   Hgb 9.6 -> 7.0 -> s/p 1 unit > now 9.1 >9.6>10.4>8.6  GI consulted Dr. Galicia: no EGD or c scope planned/necessary  FOBT + but no active melena/hematochezia. No urgent need for invasive intervention per GI   continue PPI BID PO  Heparin DC'd this morning as patient has had 2 therapeutic INR's     #APS on warfarin   #Occluded right subclavian vein stent   Arterial duplex: Occluded or almost occluded medial portion of the right subclavian vein stent -> vascular consult: No acute surgical intervention needed, unlikely stent completely occulded based on physical exam, resume a/c  s/p 7.5 warfarin on 10/25  started home dose of warfarin 6mg - c/w heparin drip until goal INR of 2-3   INR 3.05 todayAfter 4.5 mg dose warfarin last night and heparin drip overnight  Will DC heparin drip now and continue with warfarin 6 mg p.o. daily    #Elevated troponin  Troponin 86 -> 281 -> 174  TTE as above with preserved LVEF, grade 1 LV diastolic dysfunction, +regional WMAs present  Cardiology consulted : not candidate for ischemic evaluation-> cont to tx medically, ASA, statin    #Recent MRSA endocarditis   #multiple ulcers   completed tx with Vanco via PICC on 10/18  TTE without vegetations   BCx 10/18 Staph hominis and Staph capitis, CoNS 2 out of 4 bottles  BCx 10/20 negative  ID consulted , BCx from 10/18 likely contaminant, IV abx discontinued but then restarted for infected ulcers  Received meropenem as an inpatient for wound culture results  Midline placed 10/28- will switch to ertapenam upon dc - To continue ertapenem 500 mg IV daily through 11/3    #ESRD on HD   HD per renal  planned for tomorrow morning - TTS shedule    #hypercalcemic   started on cinacalcet   management per nephro     #Seizure d/o   #Hx CVA with dysphagia, functional quadriplegia   continue vimpat    Neurology consulted, unable to perform EEG due to lack of cooperation from pt  soft and bite sized diet    #Numerous bed sores/unstageable Sacral decubitus ulcer with concern for polymicrobial infection  s/p CFX 2gm iv qd  Wound culture 10/22 with ESBL Kleb, ESBL Proteus R Flouroquinolone and Bactrim, Bacteriodes   plan for IV Ertapenem 500mg q24 via midline for 10-day course (enddate: 11/3/2024)  surgery consult : recc chemical debridement , no sx debridement required at this time  ofirmev for pain  Dilaudid DCed  midline placed 10/28  will switch to ertapenam upon dc    #Code status   palliative care following  pt is DNR, OK to intubate     #DVT ppx   Heparin to warfarin drip started on 10/26  c/w warfarin 6mg po qd

## 2024-10-29 NOTE — DISCHARGE NOTE PROVIDER - NSDCPNSUBOBJ_GEN_ALL_CORE
HOSPITALIST ATTENDING PROGRESS NOTE    Chart and meds reviewed.  Patient seen and examined.    CC: hypotension    Subjective: no acute events overnight; heparin drip dced this morning as inr is 3.05    All other systems reviewed and found to be negative with the exception of what has been described above.    MEDICATIONS  (STANDING):  aspirin  chewable 81 milliGRAM(s) Oral daily  atorvastatin 40 milliGRAM(s) Oral at bedtime  busPIRone 10 milliGRAM(s) Oral two times a day  chlorhexidine 4% Liquid 1 Application(s) Topical <User Schedule>  cinacalcet 30 milliGRAM(s) Oral daily  Dakins Solution - 1/4 Strength 1 Application(s) Topical every 12 hours  epoetin fredi-epbx (RETACRIT) Injectable 10812 Unit(s) IV Push <User Schedule>  escitalopram 20 milliGRAM(s) Oral daily  lacosamide 150 milliGRAM(s) Oral two times a day  meropenem Injectable 500 milliGRAM(s) IV Push every 24 hours  metoprolol succinate ER 25 milliGRAM(s) Oral daily  midodrine. 5 milliGRAM(s) Oral three times a day  pantoprazole    Tablet 40 milliGRAM(s) Oral every 12 hours  polyethylene glycol 3350 17 Gram(s) Oral daily  traZODone 100 milliGRAM(s) Oral at bedtime    MEDICATIONS  (PRN):  albumin human 25% IVPB 50 milliLiter(s) IV Intermittent <User Schedule> PRN SBP less [id number]  haloperidol    Injectable 5 milliGRAM(s) IV Push every 6 hours PRN agitation      VITALS:  T(F): 97 (10-29-24 @ 10:31), Max: 99 (10-28-24 @ 20:30)  HR: 96 (10-29-24 @ 12:49) (80 - 108)  BP: 117/60 (10-29-24 @ 12:49) (85/59 - 134/60)  RR: 17 (10-29-24 @ 12:49) (16 - 19)  SpO2: 97% (10-29-24 @ 09:41) (96% - 100%)  Wt(kg): --    I&O's Summary      CAPILLARY BLOOD GLUCOSE          PHYSICAL EXAM:  Gen: NAD  CV:  +S1, +S2, regular, no murmurs or rubs  RESP:   lungs clear to auscultation bilaterally, no wheezing, rales, rhonchi, good air entry bilaterally  BREAST:  not examined  GI:  abdomen soft, non-tender, non-distended, normal BS, no bruits, no abdominal masses, no palpable masses  RECTAL:  not examined  :  not examined  MSK:   normal muscle tone, no atrophy, no rigidity, no contractions  EXT:  no clubbing, no cyanosis, no edema, no calf pain, swelling or erythema  VASCULAR:  pulses equal and symmetric in the upper and lower extremities  NEURO:  alert and awake; aoX1 follows commands  SKIN:  multiple ulcers - see flowsheets    LABS:                            8.6    12.24 )-----------( 228      ( 29 Oct 2024 10:20 )             27.2     10-29    135  |  101  |  27[H]  ----------------------------<  100[H]  4.0   |  28  |  6.60[H]    Ca    11.1[H]      29 Oct 2024 10:20  Phos  3.5     10-29    TPro  x   /  Alb  2.6[L]  /  TBili  x   /  DBili  x   /  AST  x   /  ALT  x   /  AlkPhos  x   10-29        LIVER FUNCTIONS - ( 29 Oct 2024 10:20 )  Alb: 2.6 g/dL / Pro: x     / ALK PHOS: x     / ALT: x     / AST: x     / GGT: x           PT/INR - ( 29 Oct 2024 10:20 )   PT: 35.6 sec;   INR: 3.05 ratio         PTT - ( 29 Oct 2024 10:20 )  PTT:144.4 sec  Urinalysis Basic - ( 29 Oct 2024 10:20 )    Color: x / Appearance: x / SG: x / pH: x  Gluc: 100 mg/dL / Ketone: x  / Bili: x / Urobili: x   Blood: x / Protein: x / Nitrite: x   Leuk Esterase: x / RBC: x / WBC x   Sq Epi: x / Non Sq Epi: x / Bacteria: x              CULTURES: reviewed

## 2024-10-29 NOTE — DISCHARGE NOTE PROVIDER - CARE PROVIDER_API CALL
Tena Johnson.  Internal Medicine  [address] [place] [id number]  Phone: [phone]  Fax: [phone]  Follow Up Time: 2 weeks

## 2024-10-29 NOTE — PROGRESS NOTE ADULT - ASSESSMENT
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) via lefr arm picc  rt AVF   hypercalcemia   SHock r/o sepsis     REC  - no urgent indication for hd now   - no identifiable reachable next of kin/hcp will plan for 2PC for HD consent   - rt AVF with + briuit, weak thrill   - hypercalcemia work-up including spep and upep   - fu pt/inr, pt may require temp hd catheter     coumadin on hold until results available   - pressor support with levophed  - s/p CTA with A/P await results  - echo   - abg   - fu cx send   dw RENITA Bedolla and Dr White    10/19 MK   - ESRD     hd today and will attempt uf with hd     via avf   - APL no now on iv heparin    rt subclavian clot     hx of cva with aphasia   - GPC sepsis     dapto and cefepime as per ID, renally dosed     fu cx     trend of outpt cx noted from id noted in sept cleared bacteremia     TTE no vegetation     ? need to dc PICC line   dw dr white and ccu team     10/20 MK   - ESRD      likely tts while inpt, sooner if clinically indicated     via avf, fx well with hd yesterday   - APL no now on iv heparin    rt subclavian clot     hx of cva with aphasia   - melena for hold of heparin     no renal barrier for IV contrast   - GPC sepsis     dapto and cefepime as per ID, renally dosed     fu cx     trend of outpt cx noted from id noted in sept cleared bacteremia     TTE no vegetation     ? need to dc PICC line   dw dr white and ccu team     10/21 Mk   - ESRD tts, AVF   - APL no now on iv heparin    rt subclavian clot     hx of cva with aphasia   - ? melena    gi input noted, brown stool     fu trend of h/h  - GPC / staph hominis sepsis     dapto and cefepime as per ID, renally dosed     fu repeat cx 10/20    trend of outpt cx noted from id noted in sept cleared bacteremia     TTE no vegetation   dw dr baca and  ccu team     10/22 MK   - ESRD tts, AVF     tolerating hd   - hypercalcemia      correction with HD on low calcium bath      may need to start sensipar at low dose based upong trend     pth 258  - APL no now on iv heparin    rt subclavian clot     hx of cva with aphasia   - ? melena    gi input noted, brown stool     fu trend of h/h    s/p 1unit of prbc 10/21     start arjun with hd 10K  - GPC / staph hominis sepsis     dapto and cefepime as per ID, renally dosed     fu repeat cx 10/20 NGTD    trend of outpt cx noted from id noted in sept cleared bacteremia     TTE no vegetation   dw dr baca and  ccu team     10/24 SY  --ESRD : HD order and tx reviewed.  Tolerating tx well.  --MBD: high Calcium level despite low calcium baths.  --likely due to inactivity rather than severe HPT.    Trial of Cinacalcet since Calcium level is trending up.  --Anemia : continue ARJUN with HD.  --APS : Warfarin restarted.    10/26 SY  --ESRD : HD order and tx reviewed.  Tolerating tx well.  --MBD: follow up Phos level.  Continue Cinacalcet for hypercalcemia.  --Anemia : continue ARJUN with HD.  --APS : A/c.    10/28 mk   - ESRD tts, AVF     he in am   - hypercalcemia      cinacalcet started, fu trend   - APL no now on iv heparin bridge to coumadin     rt subclavian clot     hx of cva with aphasia   -anemia      arjun with hd 10K  - Decub  polymicrobial GPC / staph hominis sepsis      meropenem renally dosed,     TTE no vegetation   dc planning in progress, pt will resume home HD schedule as per [facility]PE[facility]          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) via lefr arm picc  rt AVF   hypercalcemia   SHock r/o sepsis     REC  - no urgent indication for hd now   - no identifiable reachable next of kin/hcp will plan for 2PC for HD consent   - rt AVF with + briuit, weak thrill   - hypercalcemia work-up including spep and upep   - fu pt/inr, pt may require temp hd catheter     coumadin on hold until results available   - pressor support with levophed  - s/p CTA with A/P await results  - echo   - abg   - fu cx send   dw RENITA Bedolla and Dr White    10/19 MK   - ESRD     hd today and will attempt uf with hd     via avf   - APL no now on iv heparin    rt subclavian clot     hx of cva with aphasia   - GPC sepsis     dapto and cefepime as per ID, renally dosed     fu cx     trend of outpt cx noted from id noted in sept cleared bacteremia     TTE no vegetation     ? need to dc PICC line   dw dr white and ccu team     10/20 MK   - ESRD      likely tts while inpt, sooner if clinically indicated     via avf, fx well with hd yesterday   - APL no now on iv heparin    rt subclavian clot     hx of cva with aphasia   - melena for hold of heparin     no renal barrier for IV contrast   - GPC sepsis     dapto and cefepime as per ID, renally dosed     fu cx     trend of outpt cx noted from id noted in sept cleared bacteremia     TTE no vegetation     ? need to dc PICC line   dw dr white and ccu team     10/21 Mk   - ESRD tts, AVF   - APL no now on iv heparin    rt subclavian clot     hx of cva with aphasia   - ? melena    gi input noted, brown stool     fu trend of h/h  - GPC / staph hominis sepsis     dapto and cefepime as per ID, renally dosed     fu repeat cx 10/20    trend of outpt cx noted from id noted in sept cleared bacteremia     TTE no vegetation   dw dr baca and  ccu team     10/22 MK   - ESRD tts, AVF     tolerating hd   - hypercalcemia      correction with HD on low calcium bath      may need to start sensipar at low dose based upong trend     pth 258  - APL no now on iv heparin    rt subclavian clot     hx of cva with aphasia   - ? melena    gi input noted, brown stool     fu trend of h/h    s/p 1unit of prbc 10/21     start arjun with hd 10K  - GPC / staph hominis sepsis     dapto and cefepime as per ID, renally dosed     fu repeat cx 10/20 NGTD    trend of outpt cx noted from id noted in sept cleared bacteremia     TTE no vegetation   dw dr baca and  ccu team     10/24 SY  --ESRD : HD order and tx reviewed.  Tolerating tx well.  --MBD: high Calcium level despite low calcium baths.  --likely due to inactivity rather than severe HPT.    Trial of Cinacalcet since Calcium level is trending up.  --Anemia : continue ARJUN with HD.  --APS : Warfarin restarted.    10/26 SY  --ESRD : HD order and tx reviewed.  Tolerating tx well.  --MBD: follow up Phos level.  Continue Cinacalcet for hypercalcemia.  --Anemia : continue ARJUN with HD.  --APS : A/c.    10/28 mk   - ESRD tts, AVF     he in am   - hypercalcemia      cinacalcet started, fu trend   - APL no now on iv heparin bridge to coumadin     rt subclavian clot     hx of cva with aphasia   -anemia      arjun with hd 10K  - Decub  polymicrobial GPC / staph hominis sepsis      meropenem renally dosed,     TTE no vegetation   dc planning in progress, pt will resume home HD schedule as per [facility]PE[facility]     10/29 MK   - ESRD tts, AVF     tolerating hd, limited uf with hd    - hypercalcemia      cinacalcet started, fu trend of calcium   - APL no now on iv heparin bridge to coumadin     rt subclavian clot     hx of cva with aphasia   -anemia      arjun with hd 10K  - Decub  polymicrobial GPC / staph hominis sepsis      meropenem renally dosed,     TTE no vegetation   for dc to rehab with hd today, no renal barrier  dw dr jackson

## 2024-10-29 NOTE — PROVIDER CONTACT NOTE (CRITICAL VALUE NOTIFICATION) - TEST AND RESULT REPORTED:
wound culture klebsiella penumoniae final
.4
troponin 85.68
 4.8/17.2
Bcx from 10/18- gram + cocci in clusters in aerobic bottle
troponin 281.43

## 2024-10-29 NOTE — PROGRESS NOTE ADULT - PROVIDER SPECIALTY LIST ADULT
Critical Care
Infectious Disease
Infectious Disease
Nephrology
Palliative Care
Critical Care
Hospitalist
Infectious Disease
Nephrology
Cardiology
Hospitalist
Infectious Disease
Nephrology
Neurology
Critical Care
Hospitalist
Hospitalist
Infectious Disease
Neurology
Critical Care
Hospitalist
Cardiology
Hospitalist

## 2024-10-29 NOTE — PROGRESS NOTE ADULT - SUBJECTIVE AND OBJECTIVE BOX
NEPHROLOGY INTERVAL HPI/OVERNIGHT EVENTS:  10-29-24 @ 11:37    10/28 alert, comfortable s/p midline placement   10/26--Alert,  responding appropriately.  10/24--Alert, no distress.  denies pain today.  able to state her name.  10/22 seen at hd , no c/o  10/21 1unit prbc, no sob, comfortable   10/20 + melena for CT with ivc   10/19 seen at HD, alert awake   HPI:  54-year-old patient with past medical history for antiphospholipid syndrome, end-stage renal disease on dialysis Monday Wednesday Friday, acute embolism and thrombosis of the right internal jugular vein, high blood pressure, stroke, endocarditis, high cholesterol, presents emergency department for hypoxia, hypotension, less responsiveness.  Patient is awake,  soft  smoking and  answers few questions.  Patient currently with no pain  .  [facility] called and informed that patient was found this morning satting 60%, blood pressure 87/55 and low temperature of 97.2.  Patient was sent to the ER for further evaluation.  Patient is a DNR, but   Not DNI  ICU called for consult  10/18: Hypotensive despite 1L IVF; add'l ordered, ESRD noted. Pressors ordered. Workup in progress.  (18 Oct 2024 11:35)  Patient is a 54y old  Female who presents with a chief complaint of Shock (18 Oct 2024 11:35)  ==============================================================================  NEPHROLOGY CONSULT  hx from chart and reivew of transfer document   esrd at [place] on daily hd m-f   on MRSA endocard tx on vanc 500 mg end date 10/18 via left PICC line   admitted for shock with hypoxia   s/p CTA and a/p await results  on levofed   abg pending, sa02 not recordable   lethargic   has hx of APL on coumadin, with SLE on plaquenil   AVF left arm with + thrill and bruit  CCM input noted, unable to identify HCP         MEDICATIONS  (STANDING):  aspirin  chewable 81 milliGRAM(s) Oral daily  atorvastatin 40 milliGRAM(s) Oral at bedtime  busPIRone 10 milliGRAM(s) Oral two times a day  chlorhexidine 4% Liquid 1 Application(s) Topical <User Schedule>  cinacalcet 30 milliGRAM(s) Oral daily  Dakins Solution - 1/4 Strength 1 Application(s) Topical every 12 hours  epoetin fredi-epbx (RETACRIT) Injectable 04863 Unit(s) IV Push <User Schedule>  escitalopram 20 milliGRAM(s) Oral daily  heparin  Infusion. 900 Unit(s)/Hr (9 mL/Hr) IV Continuous <Continuous>  lacosamide 150 milliGRAM(s) Oral two times a day  meropenem Injectable 500 milliGRAM(s) IV Push every 24 hours  metoprolol succinate ER 25 milliGRAM(s) Oral daily  midodrine. 5 milliGRAM(s) Oral three times a day  pantoprazole    Tablet 40 milliGRAM(s) Oral every 12 hours  polyethylene glycol 3350 17 Gram(s) Oral daily  traZODone 100 milliGRAM(s) Oral at bedtime    MEDICATIONS  (PRN):  albumin human 25% IVPB 50 milliLiter(s) IV Intermittent <User Schedule> PRN SBP less [id number]  haloperidol    Injectable 5 milliGRAM(s) IV Push every 6 hours PRN agitation  heparin   Injectable 2500 Unit(s) IV Push every 6 hours PRN For aPTT between 40 - 57  heparin   Injectable 5500 Unit(s) IV Push every 6 hours PRN For aPTT less than 40      Allergies    No Known Allergies    Intolerances        I&O's Detail      .    Patient was seen and evaluated on dialysis.   Patient is tolerating the procedure well.   Continue dialysis:   Dialyzer:          QB:        QD:   Goal UF ___ over ___ Hours       Vital Signs Last 24 Hrs  T(C): 36.1 (29 Oct 2024 10:31), Max: 37.2 (28 Oct 2024 20:30)  T(F): 97 (29 Oct 2024 10:31), Max: 99 (28 Oct 2024 20:30)  HR: 92 (29 Oct 2024 11:17) (80 - 108)  BP: 124/62 (29 Oct 2024 11:17) (90/54 - 134/60)  BP(mean): --  RR: 16 (29 Oct 2024 11:17) (16 - 19)  SpO2: 97% (29 Oct 2024 09:41) (96% - 100%)    Parameters below as of 29 Oct 2024 10:53  Patient On (Oxygen Delivery Method): room air      Daily     Daily     PHYSICAL EXAM:  General: alert. awake Ox3  HEENT: MMM  CV: s1s2 rrr  LUNGS: B/L CTA  EXT: no edema    LABS:                        8.6    12.24 )-----------( 228      ( 29 Oct 2024 10:20 )             27.2     10-29    135  |  101  |  27[H]  ----------------------------<  100[H]  4.0   |  28  |  6.60[H]    Ca    11.1[H]      29 Oct 2024 10:20  Phos  3.5     10-29    TPro  x   /  Alb  2.6[L]  /  TBili  x   /  DBili  x   /  AST  x   /  ALT  x   /  AlkPhos  x   10-29    PT/INR - ( 29 Oct 2024 10:20 )   PT: 35.6 sec;   INR: 3.05 ratio         PTT - ( 29 Oct 2024 10:20 )  PTT:144.4 sec  Urinalysis Basic - ( 29 Oct 2024 10:20 )    Color: x / Appearance: x / SG: x / pH: x  Gluc: 100 mg/dL / Ketone: x  / Bili: x / Urobili: x   Blood: x / Protein: x / Nitrite: x   Leuk Esterase: x / RBC: x / WBC x   Sq Epi: x / Non Sq Epi: x / Bacteria: x      Phosphorus: 3.5 mg/dL (10-29 @ 10:20)       NEPHROLOGY INTERVAL HPI/OVERNIGHT EVENTS:  10-29-24 @ 11:37    10/29 seen at hd, for dc today   10/28 alert, comfortable s/p midline placement   10/26--Alert,  responding appropriately.  10/24--Alert, no distress.  denies pain today.  able to state her name.  10/22 seen at hd , no c/o  10/21 1unit prbc, no sob, comfortable   10/20 + melena for CT with ivc   10/19 seen at HD, alert awake   HPI:  54-year-old patient with past medical history for antiphospholipid syndrome, end-stage renal disease on dialysis Monday Wednesday Friday, acute embolism and thrombosis of the right internal jugular vein, high blood pressure, stroke, endocarditis, high cholesterol, presents emergency department for hypoxia, hypotension, less responsiveness.  Patient is awake,  soft  smoking and  answers few questions.  Patient currently with no pain  .  [facility] called and informed that patient was found this morning satting 60%, blood pressure 87/55 and low temperature of 97.2.  Patient was sent to the ER for further evaluation.  Patient is a DNR, but   Not DNI  ICU called for consult  10/18: Hypotensive despite 1L IVF; add'l ordered, ESRD noted. Pressors ordered. Workup in progress.  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 500 mg end date 10/18 via left PICC line   admitted for shock with hypoxia   s/p CTA and a/p await results  on levofed   abg pending, sa02 not recordable   lethargic   has hx of APL on coumadin, with SLE on plaquenil   AVF left arm with + thrill and bruit  CCM input noted, unable to identify HCP         MEDICATIONS  (STANDING):  aspirin  chewable 81 milliGRAM(s) Oral daily  atorvastatin 40 milliGRAM(s) Oral at bedtime  busPIRone 10 milliGRAM(s) Oral two times a day  chlorhexidine 4% Liquid 1 Application(s) Topical <User Schedule>  cinacalcet 30 milliGRAM(s) Oral daily  Dakins Solution - 1/4 Strength 1 Application(s) Topical every 12 hours  epoetin fredi-epbx (RETACRIT) Injectable 91666 Unit(s) IV Push <User Schedule>  escitalopram 20 milliGRAM(s) Oral daily  heparin  Infusion. 900 Unit(s)/Hr (9 mL/Hr) IV Continuous <Continuous>  lacosamide 150 milliGRAM(s) Oral two times a day  meropenem Injectable 500 milliGRAM(s) IV Push every 24 hours  metoprolol succinate ER 25 milliGRAM(s) Oral daily  midodrine. 5 milliGRAM(s) Oral three times a day  pantoprazole    Tablet 40 milliGRAM(s) Oral every 12 hours  polyethylene glycol 3350 17 Gram(s) Oral daily  traZODone 100 milliGRAM(s) Oral at bedtime    MEDICATIONS  (PRN):  albumin human 25% IVPB 50 milliLiter(s) IV Intermittent <User Schedule> PRN SBP less [id number]  haloperidol    Injectable 5 milliGRAM(s) IV Push every 6 hours PRN agitation  heparin   Injectable 2500 Unit(s) IV Push every 6 hours PRN For aPTT between 40 - 57  heparin   Injectable 5500 Unit(s) IV Push every 6 hours PRN For aPTT less than 40      Allergies    No Known Allergies    Intolerances        I&O's Detail      .    Patient was seen and evaluated on dialysis.   Patient is tolerating the procedure well.   Continue dialysis:   Dialyzer:     f180 k protocol uf goal of 1.2 kg     Vital Signs Last 24 Hrs  T(C): 36.1 (29 Oct 2024 10:31), Max: 37.2 (28 Oct 2024 20:30)  T(F): 97 (29 Oct 2024 10:31), Max: 99 (28 Oct 2024 20:30)  HR: 92 (29 Oct 2024 11:17) (80 - 108)  BP: 124/62 (29 Oct 2024 11:17) (90/54 - 134/60)  BP(mean): --  RR: 16 (29 Oct 2024 11:17) (16 - 19)  SpO2: 97% (29 Oct 2024 09:41) (96% - 100%)    Parameters below as of 29 Oct 2024 10:53  Patient On (Oxygen Delivery Method): room air      Daily     Daily     PHYSICAL EXAM:  General: alert. awake NAD  HEENT: MMM  CV: s1s2 rrr  LUNGS: B/L CTA  EXT: no edema    LABS:                        8.6    12.24 )-----------( 228      ( 29 Oct 2024 10:20 )             27.2     10-29    135  |  101  |  27[H]  ----------------------------<  100[H]  4.0   |  28  |  6.60[H]    Ca    11.1[H]      29 Oct 2024 10:20  Phos  3.5     10-29    TPro  x   /  Alb  2.6[L]  /  TBili  x   /  DBili  x   /  AST  x   /  ALT  x   /  AlkPhos  x   10-29    PT/INR - ( 29 Oct 2024 10:20 )   PT: 35.6 sec;   INR: 3.05 ratio         PTT - ( 29 Oct 2024 10:20 )  PTT:144.4 sec  Urinalysis Basic - ( 29 Oct 2024 10:20 )    Color: x / Appearance: x / SG: x / pH: x  Gluc: 100 mg/dL / Ketone: x  / Bili: x / Urobili: x   Blood: x / Protein: x / Nitrite: x   Leuk Esterase: x / RBC: x / WBC x   Sq Epi: x / Non Sq Epi: x / Bacteria: x      Phosphorus: 3.5 mg/dL (10-29 @ 10:20)

## 2024-10-29 NOTE — DISCHARGE NOTE PROVIDER - NSDCMRMEDTOKEN_GEN_ALL_CORE_FT
aspirin 81 mg oral tablet, chewable: 1 tab(s) orally once a day  busPIRone 10 mg oral tablet: 1 tab(s) orally 2 times a day  cinacalcet 30 mg oral tablet: 1 tab(s) orally once a day  epoetin fredi: 10,000 unit(s) intravenous 3 times a week 10,000 units IV push At HD  ertapenem 1 g injection: 0.5 gram(s) intravenously once a day via Midline (last day 11/3) MDD: 500mg  lacosamide 150 mg oral tablet: 1 tab(s) orally 2 times a day  Lexapro 20 mg oral tablet: 1 tab(s) orally once a day  Lipitor 40 mg oral tablet: 1 tab(s) orally once a day (at bedtime)  metoprolol succinate 25 mg oral capsule, extended release: 1 cap(s) orally once a day  miconazole 2% topical ointment: Apply topically to affected area 2 times a day apply to groin, perinium, buttocks  midodrine 5 mg oral tablet: 1 tab(s) orally 3 times a day  pantoprazole 40 mg oral delayed release tablet: 1 tab(s) orally every 12 hours  Plaquenil 200 mg oral tablet: 2 tab(s) orally once a day  Polyethylene Glycol 3350: 17 billion vector genomes orally once a day  Santyl 250 units/g topical ointment: Apply topically to affected area once a day apply to sacrum  senna (sennosides) 8.6 mg oral tablet: 1 tab(s) orally once a day  traMADol 50 mg oral tablet: 1 tab(s) orally every 8 hours as needed for  severe pain  traZODone 100 mg oral tablet: 1 tab(s) orally once a day (at bedtime)  Tylenol Caplet 325 mg oral tablet: 2 tab(s) orally every 6 hours as needed for fever and moderate pain  warfarin 6 mg oral tablet: 1 tab(s) orally once a day  Xanax 0.5 mg oral tablet: 1 tab(s) orally once a day

## 2024-11-06 NOTE — CDI QUERY NOTE - [id number]_GEN_ALL_CORE_HH
Clinical documentation and/or evidence in the medical record indicates that this patient has sepsis.  In order to ensure accurate coding and accuracy of the clinical record, the documentation in this patient’s medical record requires additional clarification.      Please include more specific documentation as to the type/status of sepsis in your progress note and/or discharge summary.       Please clarify if the patient was found to have:         •	Severe Sepsis  and septic shock POA ruled in        •	Sepsis and septic shock ruled out after study          •	Other (specify)        Clinical indications:    T(C): 36.2 (18 Oct 2024 12:39), Max: 37.4 (18 Oct 2024 07:22)  T(F): 97.2 (18 Oct 2024 12:39), Max: 99.3 (18 Oct 2024 07:22)  HR: 92 (18 Oct 2024 13:00) (92 - 115)  BP: 105/49 (18 Oct 2024 13:00) (35/26 - 111/56)  BP(mean): 62 (18 Oct 2024 13:00) (29 - 83)  RR: 18 (18 Oct 2024 13:00) (10 - 27)  SpO2: 100% (18 Oct 2024 12:13) (80% - 100%)    Parameters below as of 18 Oct 2024 13:00  Patient On (Oxygen Delivery Method): mask, nonrebreather    WBC Count: 12.24 K/uL (10.29.24 @ 10:20)   WBC Count: 11.56 K/uL (10.28.24 @ 07:28)   WBC Count: 10.59 K/uL (10.27.24 @ 07:27)   WBC Count: 13.27 K/uL (10.26.24 @ 06:14)   WBC Count: 14.04 K/uL (10.25.24 @ 08:00)   WBC Count: 14.78 K/uL (10.24.24 @ 07:22)   WBC Count: 10.68 K/uL (10.23.24 @ 07:43)   WBC Count: 10.03 K/uL (10.22.24 @ 21:11)   WBC Count: 9.88 K/uL (10.22.24 @ 06:14)   WBC Count: 10.32 K/uL (10.21.24 @ 16:00)   WBC Count: 9.04 K/uL (10.21.24 @ 08:23)   WBC Count: 11.86 K/uL (10.20.24 @ 23:28)   WBC Count: 6.81 K/uL (10.20.24 @ 22:31)   WBC Count: 10.70 K/uL (10.20.24 @ 12:51)   WBC Count: 10.48 K/uL (10.20.24 @ 06:47)   WBC Count: 12.40 K/uL (10.19.24 @ 05:42)   WBC Count: 9.57 K/uL (10.18.24 @ 23:08)   WBC Count: 11.03 K/uL (10.18.24 @ 07:32)       ED Provider documentation on 10/18/2024:  SEPSIS – was this patient treated for sepsis? Yes.   Presentation suggestive of: Bacterial Etiology Suspicion of sepsis at: 18-Oct-2024 10:52      H&P Critical care documentation on 10/19/2024:  Shock, unclear etiology  Hypoxic, yet no infiltrates on CT chest  Urine without obv infx  APS, ? PE    Sepsis bundle  Levophed, titrate as needed to maintain SBP > 100        Nephrology documentation on 10/19/2024:  SHock r/o sepsis   - GPC sepsis     dapto and cefepime as per ID, renally dosed      Palliative care documentation on 10/21/2024:   Patient is currently admitted to ICU for acute septic shock in setting acute hypoxic respiratory failure.      Cardiology documentation on 10/21/2024:  ·  Recommendation: -Echo w/ normal EF w/ apical anteroseptal & apical inferior WMA  -Trop mildly elevated likely in the setting of sepsis.  ECG w/ nonspecific changes.  -pt unable to provide history-h/o CVA & other issues w/ poor cognitive function.    -Trop elevation likely related to sepsis.  -pt is a poor candidate for ischemic workup.                                                                      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     Regarding hypotension initially thought to be due to possible GI bleed.      #Hypotension/shock multifactorial - hypovolemic/medication   #Acute toxic encephalopathy (resolved) - pt at baseline  #Acute hypoxic respiratory failure - resolved  metoprolol restarted but pt now hypotensive over the last 48 hours despite stable hgb  attempted 250cc bolus yesterday with no response in bp  pt unable to participate in orthostatic blood pressure 2/2 encephalopathy and contracted extremities - will empirically treat with midodrine  SBP's greater than 100 with midodrine we will continue  Continue to hold nifedipine   MADIHA - AOx1 - awake and alert - at baseline     #Recent MRSA endocarditis   #multiple ulcers   completed tx with Vanco via PICC on 10/18  TTE without vegetations   BCx 10/18 Staph hominis and Staph capitis, CoNS 2 out of 4 bottles  BCx 10/20 negative  ID consulted , BCx from 10/18 likely contaminant, IV abx discontinued but then restarted for infected ulcers  Received meropenem as an inpatient for wound culture results  Midline placed 10/28- will switch to ertapenam upon dc - To continue ertapenem 500 mg IV daily through 11/3      #Recent MRSA endocarditis   #multiple ulcers   completed tx with Vanco via PICC on 10/18  TTE without vegetations   BCx 10/18 Staph hominis and Staph capitis, CoNS 2 out of 4 bottles  BCx 10/20 negative  ID consulted , BCx from 10/18 likely contaminant, IV abx discontinued but then restarted for infected ulcers  Received meropenem as an inpatient for wound culture results  Midline placed 10/28- will switch to ertapenam upon dc - To continue ertapenem 500 mg IV daily through 11/3